# Patient Record
Sex: FEMALE | Race: WHITE | NOT HISPANIC OR LATINO | Employment: UNEMPLOYED | URBAN - METROPOLITAN AREA
[De-identification: names, ages, dates, MRNs, and addresses within clinical notes are randomized per-mention and may not be internally consistent; named-entity substitution may affect disease eponyms.]

---

## 2017-01-03 ENCOUNTER — PATIENT MESSAGE (OUTPATIENT)
Dept: FAMILY MEDICINE | Facility: CLINIC | Age: 27
End: 2017-01-03

## 2017-01-04 ENCOUNTER — OFFICE VISIT (OUTPATIENT)
Dept: URGENT CARE | Facility: CLINIC | Age: 27
End: 2017-01-04
Payer: MEDICAID

## 2017-01-04 VITALS
BODY MASS INDEX: 23.64 KG/M2 | SYSTOLIC BLOOD PRESSURE: 124 MMHG | WEIGHT: 138.44 LBS | DIASTOLIC BLOOD PRESSURE: 81 MMHG | TEMPERATURE: 98 F | HEART RATE: 86 BPM | HEIGHT: 64 IN

## 2017-01-04 DIAGNOSIS — R10.13 EPIGASTRIC ABDOMINAL PAIN: ICD-10-CM

## 2017-01-04 DIAGNOSIS — J06.9 UPPER RESPIRATORY TRACT INFECTION, UNSPECIFIED TYPE: Primary | ICD-10-CM

## 2017-01-04 PROCEDURE — 96372 THER/PROPH/DIAG INJ SC/IM: CPT | Mod: PBBFAC,PO

## 2017-01-04 PROCEDURE — 99213 OFFICE O/P EST LOW 20 MIN: CPT | Mod: S$PBB,,, | Performed by: NURSE PRACTITIONER

## 2017-01-04 PROCEDURE — 99214 OFFICE O/P EST MOD 30 MIN: CPT | Mod: PBBFAC,PO | Performed by: NURSE PRACTITIONER

## 2017-01-04 PROCEDURE — 99999 PR PBB SHADOW E&M-EST. PATIENT-LVL IV: CPT | Mod: PBBFAC,,, | Performed by: NURSE PRACTITIONER

## 2017-01-04 RX ORDER — PANTOPRAZOLE SODIUM 40 MG/1
40 TABLET, DELAYED RELEASE ORAL DAILY
Qty: 30 TABLET | Refills: 2 | Status: SHIPPED | OUTPATIENT
Start: 2017-01-04 | End: 2017-11-07

## 2017-01-04 RX ORDER — DEXAMETHASONE SODIUM PHOSPHATE 4 MG/ML
8 INJECTION, SOLUTION INTRA-ARTICULAR; INTRALESIONAL; INTRAMUSCULAR; INTRAVENOUS; SOFT TISSUE ONCE
Status: COMPLETED | OUTPATIENT
Start: 2017-01-04 | End: 2017-01-04

## 2017-01-04 RX ORDER — AZITHROMYCIN 250 MG/1
TABLET, FILM COATED ORAL
Qty: 6 TABLET | Refills: 0 | Status: SHIPPED | OUTPATIENT
Start: 2017-01-04 | End: 2017-01-09

## 2017-01-04 RX ORDER — DICYCLOMINE HYDROCHLORIDE 20 MG/1
20 TABLET ORAL EVERY 6 HOURS
COMMUNITY
End: 2018-01-14

## 2017-01-04 RX ADMIN — DEXAMETHASONE SODIUM PHOSPHATE 8 MG: 4 INJECTION, SOLUTION INTRAMUSCULAR; INTRAVENOUS at 12:01

## 2017-01-04 NOTE — PROGRESS NOTES
CC:   Chief Complaint   Patient presents with    Abdominal Pain    Cough     HPI: This is a new problem.   Sara Sanford is a 26 y.o. female with a complaint of URI.  The current episode started in the past 14 days.   The problem has been gradually worsening.   Associated symptoms included cough, epigastric pain/cramping.    Pertinent negatives include fever, chills, night sweats, nasal congestion, rhinorrhea, sore throat, facial pain, swollen glands, chest pain, hemoptysis, dyspnea, wheezing, nausea, vomiting, diarrhea   Treatments tried: zantac, bentyl has been used and this has provided no relief.     The current allergy list that we have since it was last reconciled is as follows:  Review of patient's allergies indicates:   Allergen Reactions    Darvocet a500 [propoxyphene n-acetaminophen]     Phenytoin sodium extended Other (See Comments)     Outpatient Medications Prior to Visit   Medication Sig Dispense Refill    atenolol (TENORMIN) 25 MG tablet Take 1 tablet (25 mg total) by mouth once daily. 30 tablet 11    butalbital-acetaminophen-caffeine -40 mg (FIORICET, ESGIC) -40 mg per tablet TAKE ONE TABLET BY MOUTH TWICE DAILY 30 tablet 0    citalopram (CELEXA) 40 MG tablet Take 1 tablet (40 mg total) by mouth once daily. 30 tablet 11    gabapentin (NEURONTIN) 600 MG tablet Take 1 tablet (600 mg total) by mouth 4 (four) times daily. 120 tablet 5    gabapentin (NEURONTIN) 800 MG tablet Take 1 tablet (800 mg total) by mouth 3 (three) times daily. 90 tablet 11    hydrOXYzine pamoate (VISTARIL) 25 MG Cap Take 1 capsule (25 mg total) by mouth every 8 (eight) hours as needed. 90 capsule 6    ibuprofen (ADVIL,MOTRIN) 800 MG tablet Take 1 tablet (800 mg total) by mouth 3 (three) times daily. 90 tablet 11    levetiracetam (KEPPRA) 500 MG Tab TAKE TWO TABLETS BY MOUTH TWICE DAILY AS DIRECTED  5    medroxyPROGESTERone (DEPO-PROVERA) 150 mg/mL injection Inject 1 mL (150 mg total) into the muscle  "every 3 (three) months. 1 mL 3    ondansetron (ZOFRAN) 4 MG tablet TAKE ONE TABLET BY MOUTH EVERY 6 HOURS AS NEEDED FOR NAUSEA 20 tablet 1    ranitidine (ZANTAC) 150 MG tablet Take 1 tablet (150 mg total) by mouth 2 (two) times daily. 60 tablet 11    trazodone (DESYREL) 150 MG tablet Take 1 tablet (150 mg total) by mouth every evening. 30 tablet 11    busPIRone (BUSPAR) 5 MG Tab Take 1 tablet (5 mg total) by mouth 2 (two) times daily. 60 tablet 6    hydrOXYzine pamoate (VISTARIL) 25 MG Cap TAKE ONE CAPSULE BY MOUTH EVERY 8 HOURS AS NEEDED FOR ANXIETY 90 capsule 0     Facility-Administered Medications Prior to Visit   Medication Dose Route Frequency Provider Last Rate Last Dose    medroxyPROGESTERone (DEPO-PROVERA) injection 150 mg  150 mg Intramuscular Q90 Days Kristian Landaverde MD   150 mg at 10/28/16 1029        Physical Exam   Visit Vitals    /81    Pulse 86    Temp 97.8 °F (36.6 °C)    Ht 5' 4" (1.626 m)    Wt 62.8 kg (138 lb 7.2 oz)    LMP  (LMP Unknown)    BMI 23.76 kg/m2     Physical Exam   Constitutional: She is oriented to person, place, and time. Vital signs are normal. She appears well-developed and well-nourished.   HENT:   Head: Normocephalic and atraumatic.   Right Ear: Tympanic membrane and ear canal normal.   Left Ear: Tympanic membrane and ear canal normal.   Nose: Mucosal edema and rhinorrhea present.   Mouth/Throat: Uvula is midline and mucous membranes are normal. Posterior oropharyngeal erythema present. No oropharyngeal exudate.   Eyes: EOM are normal. Pupils are equal, round, and reactive to light.   Neck: Neck supple.   Cardiovascular: Normal rate, regular rhythm and normal heart sounds.    Pulmonary/Chest: Effort normal and breath sounds normal.   Abdominal: Soft. Bowel sounds are normal. There is tenderness in the epigastric area.   Musculoskeletal: Normal range of motion.   Neurological: She is alert and oriented to person, place, and time.   Skin: Skin is warm and dry. "   Psychiatric: She has a normal mood and affect. Her behavior is normal. Judgment and thought content normal.   Nursing note and vitals reviewed.         Encounter Diagnoses   Name Primary?    Upper respiratory tract infection, unspecified type Yes    Epigastric abdominal pain        PLAN:    Sara was seen today for abdominal pain and cough.    Diagnoses and all orders for this visit:    Upper respiratory tract infection, unspecified type  -     dexamethasone injection 8 mg; Inject 2 mLs (8 mg total) into the muscle once.  -     azithromycin (Z-PARTH) 250 MG tablet; Take 2 tablets by mouth on day 1; Take 1 tablet by mouth on days 2-5    Epigastric abdominal pain  -     pantoprazole (PROTONIX) 40 MG tablet; Take 1 tablet (40 mg total) by mouth once daily.      Medications Ordered This Encounter      azithromycin (Z-PARTH) 250 MG tablet          Sig: Take 2 tablets by mouth on day 1; Take 1 tablet by mouth on days 2-5          Dispense:  6 tablet          Refill:  0      dexamethasone injection 8 mg      pantoprazole (PROTONIX) 40 MG tablet          Sig: Take 1 tablet (40 mg total) by mouth once daily.          Dispense:  30 tablet          Refill:  2  No orders of the defined types were placed in this encounter.    RTC if symptoms are worsening or changing significantly or if not improved by the end of therapy.

## 2017-01-04 NOTE — MR AVS SNAPSHOT
[facility]  [address]  [place] [address]  Phone: [phone]  Fax: [phone]                  Sara Sanford   2017 12:00 PM   Office Visit    Description:  Female : 1990   Provider:  Anabel Wan NP   Department:  [facility]           Reason for Visit     Abdominal Pain     Cough           Diagnoses this Visit        Comments    Upper respiratory tract infection, unspecified type    -  Primary     Epigastric abdominal pain                To Do List           Future Appointments        Provider Department Dept Phone    2017 8:30 AM NURSE, [facility] [phone]      Goals (5 Years of Data)     None      Follow-Up and Disposition     Return if symptoms worsen or fail to improve.       These Medications        Disp Refills Start End    azithromycin (Z-PARTH) 250 MG tablet 6 tablet 0 2017    Take 2 tablets by mouth on day 1; Take 1 tablet by mouth on days 2-5    [facility] [place] [facility] [address] Ph #: [phone]       pantoprazole (PROTONIX) 40 MG tablet 30 tablet 2 2017    Take 1 tablet (40 mg total) by mouth once daily. - Oral    [facility]  [place] - [address] [facility] Ph #: [phone]         [facility] On Call     [facility] On Call Nurse Care Line -  Assistance  Registered nurses in the [facility] provide clinical advisement, health education, appointment booking, and other advisory services.  Call for this free service at [phone].             Medications           Message regarding Medications     Verify the changes and/or additions to your medication regime listed below are the same as discussed with your clinician today.  If any of these changes or additions are incorrect, please notify your healthcare provider.        START taking these NEW medications        Refills     azithromycin (Z-PARTH) 250 MG tablet 0    Sig: Take 2 tablets by mouth on day 1; Take 1 tablet by mouth on days 2-5    Class: Normal    pantoprazole (PROTONIX) 40 MG tablet 2    Sig: Take 1 tablet (40 mg total) by mouth once daily.    Class: Normal    Route: Oral      These medications were administered today        Dose Freq    dexamethasone injection 8 mg 8 mg Once    Sig: Inject 2 mLs (8 mg total) into the muscle once.    Class: Normal    Route: Intramuscular           Verify that the below list of medications is an accurate representation of the medications you are currently taking.  If none reported, the list may be blank. If incorrect, please contact your healthcare provider. Carry this list with you in case of emergency.           Current Medications     atenolol (TENORMIN) 25 MG tablet Take 1 tablet (25 mg total) by mouth once daily.    butalbital-acetaminophen-caffeine -40 mg (FIORICET, ESGIC) -40 mg per tablet TAKE ONE TABLET BY MOUTH TWICE DAILY    citalopram (CELEXA) 40 MG tablet Take 1 tablet (40 mg total) by mouth once daily.    dicyclomine (BENTYL) 20 mg tablet Take 20 mg by mouth every 6 (six) hours.    gabapentin (NEURONTIN) 600 MG tablet Take 1 tablet (600 mg total) by mouth 4 (four) times daily.    gabapentin (NEURONTIN) 800 MG tablet Take 1 tablet (800 mg total) by mouth 3 (three) times daily.    hydrOXYzine pamoate (VISTARIL) 25 MG Cap Take 1 capsule (25 mg total) by mouth every 8 (eight) hours as needed.    ibuprofen (ADVIL,MOTRIN) 800 MG tablet Take 1 tablet (800 mg total) by mouth 3 (three) times daily.    levetiracetam (KEPPRA) 500 MG Tab TAKE TWO TABLETS BY MOUTH TWICE DAILY AS DIRECTED    medroxyPROGESTERone (DEPO-PROVERA) 150 mg/mL injection Inject 1 mL (150 mg total) into the muscle every 3 (three) months.    ondansetron (ZOFRAN) 4 MG tablet TAKE ONE TABLET BY MOUTH EVERY 6 HOURS AS NEEDED FOR NAUSEA    ranitidine (ZANTAC) 150 MG tablet Take 1 tablet (150 mg total) by mouth  "2 (two) times daily.    trazodone (DESYREL) 150 MG tablet Take 1 tablet (150 mg total) by mouth every evening.    azithromycin (Z-PARTH) 250 MG tablet Take 2 tablets by mouth on day 1; Take 1 tablet by mouth on days 2-5    busPIRone (BUSPAR) 5 MG Tab Take 1 tablet (5 mg total) by mouth 2 (two) times daily.    pantoprazole (PROTONIX) 40 MG tablet Take 1 tablet (40 mg total) by mouth once daily.           Clinical Reference Information           Vital Signs - Last Recorded  Most recent update: 1/4/2017 12:09 PM by Shari Isaac LPN    BP Pulse Temp Ht Wt LMP    124/81 86 97.8 °F (36.6 °C) 5' 4" (1.626 m) 62.8 kg (138 lb 7.2 oz) (LMP Unknown)    BMI                23.76 kg/m2          Blood Pressure          Most Recent Value    BP  124/81      Allergies as of 1/4/2017     Darvocet A500 [Propoxyphene N-acetaminophen]    Phenytoin Sodium Extended      Immunizations Administered on Date of Encounter - 1/4/2017     None      Smoking Cessation     If you would like to quit smoking:   You may be eligible for free services if you are a [place] resident and started smoking cigarettes before September 1, 1988.  Call the Smoking Cessation Trust (SCT) toll free at [phone] or [phone].   Call 5-498-QUIT-NOW if you do not meet the above criteria.            "

## 2017-01-16 RX ORDER — BUTALBITAL, ACETAMINOPHEN AND CAFFEINE 50; 325; 40 MG/1; MG/1; MG/1
TABLET ORAL
Qty: 30 TABLET | Refills: 0 | Status: SHIPPED | OUTPATIENT
Start: 2017-01-16 | End: 2017-01-30 | Stop reason: SDUPTHER

## 2017-01-24 ENCOUNTER — CLINICAL SUPPORT (OUTPATIENT)
Dept: FAMILY MEDICINE | Facility: CLINIC | Age: 27
End: 2017-01-24
Payer: MEDICAID

## 2017-01-24 DIAGNOSIS — Z30.9 ENCOUNTER FOR CONTRACEPTIVE MANAGEMENT, UNSPECIFIED CONTRACEPTIVE ENCOUNTER TYPE: Primary | ICD-10-CM

## 2017-01-24 PROCEDURE — 96372 THER/PROPH/DIAG INJ SC/IM: CPT | Mod: PBBFAC,PO

## 2017-01-24 RX ADMIN — MEDROXYPROGESTERONE ACETATE 150 MG: 150 INJECTION, SUSPENSION INTRAMUSCULAR at 08:01

## 2017-01-30 RX ORDER — BUTALBITAL, ACETAMINOPHEN AND CAFFEINE 50; 325; 40 MG/1; MG/1; MG/1
TABLET ORAL
Qty: 30 TABLET | Refills: 0 | Status: SHIPPED | OUTPATIENT
Start: 2017-01-30 | End: 2017-02-13 | Stop reason: SDUPTHER

## 2017-01-31 ENCOUNTER — PATIENT MESSAGE (OUTPATIENT)
Dept: FAMILY MEDICINE | Facility: CLINIC | Age: 27
End: 2017-01-31

## 2017-02-01 ENCOUNTER — OFFICE VISIT (OUTPATIENT)
Dept: FAMILY MEDICINE | Facility: CLINIC | Age: 27
End: 2017-02-01
Payer: MEDICAID

## 2017-02-01 VITALS
WEIGHT: 139.88 LBS | TEMPERATURE: 99 F | BODY MASS INDEX: 23.88 KG/M2 | HEIGHT: 64 IN | DIASTOLIC BLOOD PRESSURE: 69 MMHG | SYSTOLIC BLOOD PRESSURE: 108 MMHG | HEART RATE: 73 BPM

## 2017-02-01 DIAGNOSIS — J06.9 UPPER RESPIRATORY TRACT INFECTION, UNSPECIFIED TYPE: Primary | ICD-10-CM

## 2017-02-01 PROCEDURE — 99213 OFFICE O/P EST LOW 20 MIN: CPT | Mod: S$PBB,,, | Performed by: NURSE PRACTITIONER

## 2017-02-01 PROCEDURE — 99214 OFFICE O/P EST MOD 30 MIN: CPT | Mod: PBBFAC,PO | Performed by: NURSE PRACTITIONER

## 2017-02-01 PROCEDURE — 99999 PR PBB SHADOW E&M-EST. PATIENT-LVL IV: CPT | Mod: PBBFAC,,, | Performed by: NURSE PRACTITIONER

## 2017-02-01 RX ORDER — AZITHROMYCIN 250 MG/1
TABLET, FILM COATED ORAL
Qty: 6 TABLET | Refills: 0 | Status: SHIPPED | OUTPATIENT
Start: 2017-02-01 | End: 2017-02-06

## 2017-02-01 RX ORDER — MINERAL OIL
180 ENEMA (ML) RECTAL DAILY
Qty: 10 TABLET | Refills: 0 | Status: SHIPPED | OUTPATIENT
Start: 2017-02-01 | End: 2017-07-06

## 2017-02-01 NOTE — MR AVS SNAPSHOT
[facility] [place]  [address] [address]  Phone: [phone]  Fax: [phone]                  Sara Sanford   2017 1:40 PM   Office Visit    Description:  Female : 1990   Provider:  Deborah Valente NP   Department:  [facility] [place]           Reason for Visit     Cough     Nasal Congestion           Diagnoses this Visit        Comments    Upper respiratory tract infection, unspecified type    -  Primary            To Do List           Future Appointments        Provider Department Dept Phone    2017 10:00 AM NURSE, [facility] [phone]      Goals (5 Years of Data)     None       These Medications        Disp Refills Start End    fexofenadine (ALLEGRA) 180 MG tablet 10 tablet 0 2017    Take 1 tablet (180 mg total) by mouth once daily. - Oral    [facility]  [place] - [address] Ph #: [phone]       azithromycin (Z-PARTH) 250 MG tablet 6 tablet 0 2017    Take 2 tablets by mouth on day 1; Take 1 tablet by mouth on days 2-5    [facility] [place] [facility] [address] [facility] [address] [facility] [address] Ph #: [phone]         [facility] On Call     [facility] On Call Nurse Care Line -  Assistance  Registered nurses in the [facility] provide clinical advisement, health education, appointment booking, and other advisory services.  Call for this free service at [phone].             Medications           Message regarding Medications     Verify the changes and/or additions to your medication regime listed below are the same as discussed with your clinician today.  If any of these changes or additions are incorrect, please notify your healthcare provider.        START taking these NEW medications        Refills    fexofenadine (ALLEGRA) 180 MG tablet 0    Sig: Take 1 tablet (180 mg total) by mouth once  daily.    Class: Normal    Route: Oral    azithromycin (Z-PARTH) 250 MG tablet 0    Sig: Take 2 tablets by mouth on day 1; Take 1 tablet by mouth on days 2-5    Class: Print           Verify that the below list of medications is an accurate representation of the medications you are currently taking.  If none reported, the list may be blank. If incorrect, please contact your healthcare provider. Carry this list with you in case of emergency.           Current Medications     atenolol (TENORMIN) 25 MG tablet Take 1 tablet (25 mg total) by mouth once daily.    butalbital-acetaminophen-caffeine -40 mg (FIORICET, ESGIC) -40 mg per tablet TAKE ONE TABLET BY MOUTH TWICE DAILY    citalopram (CELEXA) 40 MG tablet Take 1 tablet (40 mg total) by mouth once daily.    dicyclomine (BENTYL) 20 mg tablet Take 20 mg by mouth every 6 (six) hours.    gabapentin (NEURONTIN) 600 MG tablet Take 1 tablet (600 mg total) by mouth 4 (four) times daily.    gabapentin (NEURONTIN) 800 MG tablet Take 1 tablet (800 mg total) by mouth 3 (three) times daily.    hydrOXYzine pamoate (VISTARIL) 25 MG Cap Take 1 capsule (25 mg total) by mouth every 8 (eight) hours as needed.    ibuprofen (ADVIL,MOTRIN) 800 MG tablet Take 1 tablet (800 mg total) by mouth 3 (three) times daily.    levetiracetam (KEPPRA) 500 MG Tab TAKE TWO TABLETS BY MOUTH TWICE DAILY AS DIRECTED    medroxyPROGESTERone (DEPO-PROVERA) 150 mg/mL injection Inject 1 mL (150 mg total) into the muscle every 3 (three) months.    ondansetron (ZOFRAN) 4 MG tablet TAKE ONE TABLET BY MOUTH EVERY 6 HOURS AS NEEDED FOR NAUSEA    pantoprazole (PROTONIX) 40 MG tablet Take 1 tablet (40 mg total) by mouth once daily.    ranitidine (ZANTAC) 150 MG tablet Take 1 tablet (150 mg total) by mouth 2 (two) times daily.    trazodone (DESYREL) 150 MG tablet Take 1 tablet (150 mg total) by mouth every evening.    azithromycin (Z-PARTH) 250 MG tablet Take 2 tablets by mouth on day 1; Take 1 tablet by mouth  "on days 2-5    busPIRone (BUSPAR) 5 MG Tab Take 1 tablet (5 mg total) by mouth 2 (two) times daily.    fexofenadine (ALLEGRA) 180 MG tablet Take 1 tablet (180 mg total) by mouth once daily.           Clinical Reference Information           Vital Signs - Last Recorded  Most recent update: 2/1/2017  1:50 PM by Shari Isaac LPN    BP Pulse Temp Ht Wt LMP    108/69 73 98.9 °F (37.2 °C) 5' 4" (1.626 m) 63.5 kg (139 lb 14.1 oz) (LMP Unknown)    BMI                24.01 kg/m2          Blood Pressure          Most Recent Value    BP  108/69      Allergies as of 2/1/2017     Darvocet A500 [Propoxyphene N-acetaminophen]    Phenytoin Sodium Extended      Immunizations Administered on Date of Encounter - 2/1/2017     None      Smoking Cessation     If you would like to quit smoking:   You may be eligible for free services if you are a [place] resident and started smoking cigarettes before September 1, 1988.  Call the Smoking Cessation Trust (SCT) toll free at [phone] or [phone].   Call 1-504-QUIT-NOW if you do not meet the above criteria.            "

## 2017-02-01 NOTE — PROGRESS NOTES
Subjective:       Patient ID: Sara Sanford is a 26 y.o. female.    Chief Complaint: Cough and Nasal Congestion    URI    This is a new problem. The current episode started in the past 7 days. The problem has been gradually improving. There has been no fever. Associated symptoms include congestion, coughing and rhinorrhea. Pertinent negatives include no abdominal pain, chest pain, diarrhea, dysuria, ear pain, headaches, joint pain, joint swelling, nausea, neck pain, plugged ear sensation, rash, sinus pain, sneezing, sore throat, swollen glands, vomiting or wheezing. She has tried decongestant for the symptoms. The treatment provided mild relief.     Past Medical History   Diagnosis Date    Abnormal Pap smear     ALLERGIC RHINITIS     Anemia     Chronic back pain     Drug-seeking behavior 4/16/2012    Epilepsy     Hypertension     Opioid dependence 4/16/2012    Seizures      last-10/2011, EEG-normal    Urinary tract infection      Social History     Social History    Marital status: Single     Spouse name: N/A    Number of children: N/A    Years of education: N/A     Occupational History         Social History Main Topics    Smoking status: Current Every Day Smoker     Packs/day: 0.25     Years: 5.00    Smokeless tobacco: Never Used    Alcohol use Yes      Comment: occasional    Drug use: No    Sexual activity: Not Currently     Birth control/ protection: Injection     Past Surgical History   Procedure Laterality Date    Vaginal delivery       times 4    [place] tooth extraction      Cholecystectomy      Vestibulectomy         Review of Systems   Constitutional: Negative.    HENT: Positive for congestion, postnasal drip and rhinorrhea. Negative for ear pain, sneezing and sore throat.    Eyes: Negative.    Respiratory: Positive for cough. Negative for wheezing.    Cardiovascular: Negative.  Negative for chest pain.   Gastrointestinal: Negative.  Negative for abdominal pain, diarrhea, nausea and  vomiting.   Endocrine: Negative.    Genitourinary: Negative.  Negative for dysuria.   Musculoskeletal: Negative.  Negative for joint pain and neck pain.   Skin: Negative.  Negative for rash.   Allergic/Immunologic: Negative.    Neurological: Negative.  Negative for headaches.   Psychiatric/Behavioral: Negative.        Objective:      Physical Exam   Constitutional: She is oriented to person, place, and time. She appears well-developed and well-nourished.   HENT:   Head: Normocephalic.   Right Ear: Hearing, tympanic membrane, external ear and ear canal normal.   Left Ear: Hearing, tympanic membrane, external ear and ear canal normal.   Nose: Rhinorrhea present. Right sinus exhibits no maxillary sinus tenderness and no frontal sinus tenderness. Left sinus exhibits no maxillary sinus tenderness and no frontal sinus tenderness.   Mouth/Throat: Uvula is midline, oropharynx is clear and moist and mucous membranes are normal.   Eyes: Conjunctivae are normal. Pupils are equal, round, and reactive to light.   Neck: Normal range of motion. Neck supple.   Cardiovascular: Normal rate, regular rhythm and normal heart sounds.    Pulmonary/Chest: Effort normal and breath sounds normal.   Abdominal: Soft. Bowel sounds are normal.   Musculoskeletal: Normal range of motion.   Neurological: She is alert and oriented to person, place, and time.   Skin: Skin is warm and dry.   Psychiatric: She has a normal mood and affect. Her behavior is normal. Judgment and thought content normal.   Nursing note and vitals reviewed.      Assessment:       1. Upper respiratory tract infection, unspecified type        Plan:           Sara was seen today for cough and nasal congestion.    Diagnoses and all orders for this visit:    Upper respiratory tract infection, unspecified type  -     fexofenadine (ALLEGRA) 180 MG tablet; Take 1 tablet (180 mg total) by mouth once daily.  -     azithromycin (Z-PARTH) 250 MG tablet; Take 2 tablets by mouth on day 1;  Take 1 tablet by mouth on days 2-5  Begin abx if symptoms persist >7-10 d

## 2017-02-13 RX ORDER — BUTALBITAL, ACETAMINOPHEN AND CAFFEINE 50; 325; 40 MG/1; MG/1; MG/1
1 TABLET ORAL 2 TIMES DAILY
Qty: 30 TABLET | Refills: 0 | Status: SHIPPED | OUTPATIENT
Start: 2017-02-13 | End: 2017-02-27 | Stop reason: SDUPTHER

## 2017-02-27 RX ORDER — BUTALBITAL, ACETAMINOPHEN AND CAFFEINE 50; 325; 40 MG/1; MG/1; MG/1
1 TABLET ORAL 2 TIMES DAILY
Qty: 30 TABLET | Refills: 0 | Status: SHIPPED | OUTPATIENT
Start: 2017-02-27 | End: 2017-03-10 | Stop reason: SDUPTHER

## 2017-03-10 RX ORDER — BUTALBITAL, ACETAMINOPHEN AND CAFFEINE 50; 325; 40 MG/1; MG/1; MG/1
1 TABLET ORAL 2 TIMES DAILY
Qty: 30 TABLET | Refills: 0 | Status: SHIPPED | OUTPATIENT
Start: 2017-03-10 | End: 2017-03-24 | Stop reason: SDUPTHER

## 2017-03-10 RX ORDER — IBUPROFEN 800 MG/1
800 TABLET ORAL 3 TIMES DAILY
Qty: 90 TABLET | Refills: 11 | Status: SHIPPED | OUTPATIENT
Start: 2017-03-10 | End: 2018-01-14

## 2017-03-24 RX ORDER — BUTALBITAL, ACETAMINOPHEN AND CAFFEINE 50; 325; 40 MG/1; MG/1; MG/1
1 TABLET ORAL 2 TIMES DAILY
Qty: 30 TABLET | Refills: 0 | Status: SHIPPED | OUTPATIENT
Start: 2017-03-24 | End: 2017-04-04 | Stop reason: SDUPTHER

## 2017-04-04 RX ORDER — BUTALBITAL, ACETAMINOPHEN AND CAFFEINE 50; 325; 40 MG/1; MG/1; MG/1
1 TABLET ORAL 2 TIMES DAILY
Qty: 30 TABLET | Refills: 0 | Status: SHIPPED | OUTPATIENT
Start: 2017-04-04 | End: 2017-04-21 | Stop reason: SDUPTHER

## 2017-04-06 DIAGNOSIS — Z30.9 ENCOUNTER FOR CONTRACEPTIVE MANAGEMENT, UNSPECIFIED TYPE: Primary | ICD-10-CM

## 2017-04-06 RX ORDER — MEDROXYPROGESTERONE ACETATE 150 MG/ML
150 INJECTION, SUSPENSION INTRAMUSCULAR
Status: CANCELLED | OUTPATIENT
Start: 2017-04-13

## 2017-04-13 ENCOUNTER — PATIENT MESSAGE (OUTPATIENT)
Dept: FAMILY MEDICINE | Facility: CLINIC | Age: 27
End: 2017-04-13

## 2017-04-13 ENCOUNTER — CLINICAL SUPPORT (OUTPATIENT)
Dept: FAMILY MEDICINE | Facility: CLINIC | Age: 27
End: 2017-04-13
Payer: MEDICAID

## 2017-04-13 DIAGNOSIS — Z30.41 ENCOUNTER FOR BIRTH CONTROL PILLS MAINTENANCE: Primary | ICD-10-CM

## 2017-04-13 PROCEDURE — 96372 THER/PROPH/DIAG INJ SC/IM: CPT | Mod: PBBFAC,PO

## 2017-04-13 RX ADMIN — MEDROXYPROGESTERONE ACETATE 150 MG: 150 INJECTION, SUSPENSION INTRAMUSCULAR at 01:04

## 2017-04-21 RX ORDER — BUTALBITAL, ACETAMINOPHEN AND CAFFEINE 50; 325; 40 MG/1; MG/1; MG/1
TABLET ORAL
Qty: 30 TABLET | OUTPATIENT
Start: 2017-04-21

## 2017-04-21 RX ORDER — BUTALBITAL, ACETAMINOPHEN AND CAFFEINE 50; 325; 40 MG/1; MG/1; MG/1
1 TABLET ORAL 2 TIMES DAILY
Qty: 30 TABLET | Refills: 0 | Status: SHIPPED | OUTPATIENT
Start: 2017-04-21 | End: 2017-05-08 | Stop reason: SDUPTHER

## 2017-05-08 ENCOUNTER — PATIENT MESSAGE (OUTPATIENT)
Dept: FAMILY MEDICINE | Facility: CLINIC | Age: 27
End: 2017-05-08

## 2017-05-08 RX ORDER — BUTALBITAL, ACETAMINOPHEN AND CAFFEINE 50; 325; 40 MG/1; MG/1; MG/1
TABLET ORAL
Qty: 30 TABLET | OUTPATIENT
Start: 2017-05-08

## 2017-05-08 RX ORDER — BUTALBITAL, ACETAMINOPHEN AND CAFFEINE 50; 325; 40 MG/1; MG/1; MG/1
1 TABLET ORAL 2 TIMES DAILY
Qty: 30 TABLET | Refills: 0 | Status: SHIPPED | OUTPATIENT
Start: 2017-05-08 | End: 2017-05-22 | Stop reason: SDUPTHER

## 2017-05-22 ENCOUNTER — PATIENT MESSAGE (OUTPATIENT)
Dept: FAMILY MEDICINE | Facility: CLINIC | Age: 27
End: 2017-05-22

## 2017-05-22 RX ORDER — BUTALBITAL, ACETAMINOPHEN AND CAFFEINE 50; 325; 40 MG/1; MG/1; MG/1
TABLET ORAL
Qty: 30 TABLET | Refills: 0 | Status: SHIPPED | OUTPATIENT
Start: 2017-05-22 | End: 2017-06-02 | Stop reason: SDUPTHER

## 2017-05-22 RX ORDER — BUTALBITAL, ACETAMINOPHEN AND CAFFEINE 50; 325; 40 MG/1; MG/1; MG/1
1 TABLET ORAL 2 TIMES DAILY
Qty: 30 TABLET | Refills: 0 | OUTPATIENT
Start: 2017-05-22

## 2017-05-24 ENCOUNTER — PATIENT MESSAGE (OUTPATIENT)
Dept: FAMILY MEDICINE | Facility: CLINIC | Age: 27
End: 2017-05-24

## 2017-06-02 RX ORDER — BUTALBITAL, ACETAMINOPHEN AND CAFFEINE 50; 325; 40 MG/1; MG/1; MG/1
1 TABLET ORAL 2 TIMES DAILY
Qty: 30 TABLET | Refills: 0 | Status: SHIPPED | OUTPATIENT
Start: 2017-06-02 | End: 2017-06-16 | Stop reason: SDUPTHER

## 2017-06-16 RX ORDER — BUTALBITAL, ACETAMINOPHEN AND CAFFEINE 50; 325; 40 MG/1; MG/1; MG/1
1 TABLET ORAL 2 TIMES DAILY
Qty: 30 TABLET | Refills: 0 | Status: SHIPPED | OUTPATIENT
Start: 2017-06-16 | End: 2017-06-30 | Stop reason: SDUPTHER

## 2017-06-19 ENCOUNTER — PATIENT MESSAGE (OUTPATIENT)
Dept: OBSTETRICS AND GYNECOLOGY | Facility: CLINIC | Age: 27
End: 2017-06-19

## 2017-06-19 DIAGNOSIS — Z11.3 SCREENING EXAMINATION FOR STD (SEXUALLY TRANSMITTED DISEASE): Primary | ICD-10-CM

## 2017-06-20 ENCOUNTER — PATIENT MESSAGE (OUTPATIENT)
Dept: OBSTETRICS AND GYNECOLOGY | Facility: CLINIC | Age: 27
End: 2017-06-20

## 2017-06-22 ENCOUNTER — PATIENT MESSAGE (OUTPATIENT)
Dept: FAMILY MEDICINE | Facility: CLINIC | Age: 27
End: 2017-06-22

## 2017-06-22 DIAGNOSIS — Z00.00 ROUTINE HEALTH MAINTENANCE: Primary | ICD-10-CM

## 2017-06-23 ENCOUNTER — LAB VISIT (OUTPATIENT)
Dept: LAB | Facility: HOSPITAL | Age: 27
End: 2017-06-23
Attending: SPECIALIST
Payer: MEDICAID

## 2017-06-23 ENCOUNTER — PATIENT MESSAGE (OUTPATIENT)
Dept: OBSTETRICS AND GYNECOLOGY | Facility: CLINIC | Age: 27
End: 2017-06-23

## 2017-06-23 DIAGNOSIS — Z11.3 SCREENING EXAMINATION FOR STD (SEXUALLY TRANSMITTED DISEASE): ICD-10-CM

## 2017-06-23 DIAGNOSIS — Z11.3 SCREENING FOR STD (SEXUALLY TRANSMITTED DISEASE): ICD-10-CM

## 2017-06-23 PROCEDURE — 36415 COLL VENOUS BLD VENIPUNCTURE: CPT | Mod: PO

## 2017-06-23 PROCEDURE — 86703 HIV-1/HIV-2 1 RESULT ANTBDY: CPT

## 2017-06-23 PROCEDURE — 86695 HERPES SIMPLEX TYPE 1 TEST: CPT

## 2017-06-23 PROCEDURE — 86694 HERPES SIMPLEX NES ANTBDY: CPT

## 2017-06-23 PROCEDURE — 86592 SYPHILIS TEST NON-TREP QUAL: CPT

## 2017-06-23 PROCEDURE — 80074 ACUTE HEPATITIS PANEL: CPT

## 2017-06-24 LAB — RPR SER QL: NORMAL

## 2017-06-26 DIAGNOSIS — Z30.9 ENCOUNTER FOR CONTRACEPTIVE MANAGEMENT, UNSPECIFIED TYPE: Primary | ICD-10-CM

## 2017-06-26 LAB
HAV IGM SERPL QL IA: NEGATIVE
HBV CORE IGM SERPL QL IA: NEGATIVE
HBV SURFACE AG SERPL QL IA: NEGATIVE
HCV AB SERPL QL IA: NEGATIVE
HIV 1+2 AB+HIV1 P24 AG SERPL QL IA: NEGATIVE
HIV 1+2 AB+HIV1 P24 AG SERPL QL IA: NEGATIVE

## 2017-06-26 RX ORDER — MEDROXYPROGESTERONE ACETATE 150 MG/ML
150 INJECTION, SUSPENSION INTRAMUSCULAR
Status: COMPLETED | OUTPATIENT
Start: 2017-07-06 | End: 2017-07-06

## 2017-06-27 LAB
HSV AB, IGM BY EIA: NEGATIVE
HSV1 IGG SERPL QL IA: NEGATIVE
HSV2 IGG SERPL QL IA: NEGATIVE

## 2017-06-30 RX ORDER — BUTALBITAL, ACETAMINOPHEN AND CAFFEINE 50; 325; 40 MG/1; MG/1; MG/1
TABLET ORAL
Qty: 30 TABLET | Refills: 0 | Status: SHIPPED | OUTPATIENT
Start: 2017-06-30 | End: 2017-07-11 | Stop reason: SDUPTHER

## 2017-07-06 ENCOUNTER — CLINICAL SUPPORT (OUTPATIENT)
Dept: FAMILY MEDICINE | Facility: CLINIC | Age: 27
End: 2017-07-06
Payer: MEDICAID

## 2017-07-06 ENCOUNTER — LAB VISIT (OUTPATIENT)
Dept: LAB | Facility: HOSPITAL | Age: 27
End: 2017-07-06
Attending: FAMILY MEDICINE
Payer: MEDICAID

## 2017-07-06 DIAGNOSIS — Z30.41 ENCOUNTER FOR BIRTH CONTROL PILLS MAINTENANCE: Primary | ICD-10-CM

## 2017-07-06 DIAGNOSIS — Z00.00 ROUTINE HEALTH MAINTENANCE: ICD-10-CM

## 2017-07-06 LAB
ALBUMIN SERPL BCP-MCNC: 4.1 G/DL
ALP SERPL-CCNC: 111 U/L
ALT SERPL W/O P-5'-P-CCNC: 29 U/L
ANION GAP SERPL CALC-SCNC: 8 MMOL/L
AST SERPL-CCNC: 21 U/L
BASOPHILS # BLD AUTO: 0.02 K/UL
BASOPHILS NFR BLD: 0.4 %
BILIRUB SERPL-MCNC: 1 MG/DL
BUN SERPL-MCNC: 10 MG/DL
CALCIUM SERPL-MCNC: 9.2 MG/DL
CHLORIDE SERPL-SCNC: 109 MMOL/L
CHOLEST/HDLC SERPL: 3.5 {RATIO}
CO2 SERPL-SCNC: 23 MMOL/L
CREAT SERPL-MCNC: 0.8 MG/DL
DIFFERENTIAL METHOD: ABNORMAL
EOSINOPHIL # BLD AUTO: 0.2 K/UL
EOSINOPHIL NFR BLD: 2.8 %
ERYTHROCYTE [DISTWIDTH] IN BLOOD BY AUTOMATED COUNT: 12.8 %
EST. GFR  (AFRICAN AMERICAN): >60 ML/MIN/1.73 M^2
EST. GFR  (NON AFRICAN AMERICAN): >60 ML/MIN/1.73 M^2
GLUCOSE SERPL-MCNC: 107 MG/DL
HCT VFR BLD AUTO: 41.2 %
HDL/CHOLESTEROL RATIO: 28.6 %
HDLC SERPL-MCNC: 126 MG/DL
HDLC SERPL-MCNC: 36 MG/DL
HGB BLD-MCNC: 14.1 G/DL
LDLC SERPL CALC-MCNC: 47.2 MG/DL
LYMPHOCYTES # BLD AUTO: 1.7 K/UL
LYMPHOCYTES NFR BLD: 29.9 %
MCH RBC QN AUTO: 31.6 PG
MCHC RBC AUTO-ENTMCNC: 34.2 %
MCV RBC AUTO: 92 FL
MONOCYTES # BLD AUTO: 0.5 K/UL
MONOCYTES NFR BLD: 9.4 %
NEUTROPHILS # BLD AUTO: 3.2 K/UL
NEUTROPHILS NFR BLD: 57.3 %
NONHDLC SERPL-MCNC: 90 MG/DL
PLATELET # BLD AUTO: 257 K/UL
PMV BLD AUTO: 9.5 FL
POTASSIUM SERPL-SCNC: 4 MMOL/L
PROT SERPL-MCNC: 7.3 G/DL
RBC # BLD AUTO: 4.46 M/UL
SODIUM SERPL-SCNC: 140 MMOL/L
TRIGL SERPL-MCNC: 214 MG/DL
TSH SERPL DL<=0.005 MIU/L-ACNC: 1.55 UIU/ML
WBC # BLD AUTO: 5.65 K/UL

## 2017-07-06 PROCEDURE — 99999 PR PBB SHADOW E&M-EST. PATIENT-LVL III: CPT | Mod: PBBFAC,,,

## 2017-07-06 PROCEDURE — 99213 OFFICE O/P EST LOW 20 MIN: CPT | Mod: PBBFAC,PO

## 2017-07-06 RX ADMIN — MEDROXYPROGESTERONE ACETATE 150 MG: 150 INJECTION, SUSPENSION INTRAMUSCULAR at 10:07

## 2017-07-07 ENCOUNTER — PATIENT MESSAGE (OUTPATIENT)
Dept: FAMILY MEDICINE | Facility: CLINIC | Age: 27
End: 2017-07-07

## 2017-07-11 ENCOUNTER — OFFICE VISIT (OUTPATIENT)
Dept: FAMILY MEDICINE | Facility: CLINIC | Age: 27
End: 2017-07-11
Payer: MEDICAID

## 2017-07-11 VITALS
HEART RATE: 92 BPM | SYSTOLIC BLOOD PRESSURE: 120 MMHG | HEIGHT: 64 IN | WEIGHT: 143.31 LBS | BODY MASS INDEX: 24.46 KG/M2 | TEMPERATURE: 99 F | DIASTOLIC BLOOD PRESSURE: 76 MMHG

## 2017-07-11 DIAGNOSIS — Z00.00 ROUTINE CHECK-UP: Primary | ICD-10-CM

## 2017-07-11 DIAGNOSIS — R10.13 EPIGASTRIC ABDOMINAL PAIN: ICD-10-CM

## 2017-07-11 PROCEDURE — 99213 OFFICE O/P EST LOW 20 MIN: CPT | Mod: PBBFAC,PO | Performed by: FAMILY MEDICINE

## 2017-07-11 PROCEDURE — 99395 PREV VISIT EST AGE 18-39: CPT | Mod: S$PBB,,, | Performed by: FAMILY MEDICINE

## 2017-07-11 PROCEDURE — 99999 PR PBB SHADOW E&M-EST. PATIENT-LVL III: CPT | Mod: PBBFAC,,, | Performed by: FAMILY MEDICINE

## 2017-07-11 RX ORDER — CITALOPRAM 40 MG/1
40 TABLET, FILM COATED ORAL DAILY
Qty: 90 TABLET | Refills: 3 | Status: SHIPPED | OUTPATIENT
Start: 2017-07-11 | End: 2018-07-25 | Stop reason: SDUPTHER

## 2017-07-11 RX ORDER — BUTALBITAL, ACETAMINOPHEN AND CAFFEINE 50; 325; 40 MG/1; MG/1; MG/1
1 TABLET ORAL 2 TIMES DAILY PRN
Qty: 30 TABLET | Refills: 0 | Status: SHIPPED | OUTPATIENT
Start: 2017-07-11 | End: 2017-08-02 | Stop reason: SDUPTHER

## 2017-07-11 RX ORDER — LEVETIRACETAM 500 MG/1
1000 TABLET ORAL 2 TIMES DAILY
Qty: 360 TABLET | Refills: 3 | Status: SHIPPED | OUTPATIENT
Start: 2017-07-11 | End: 2018-06-28

## 2017-07-11 RX ORDER — PANTOPRAZOLE SODIUM 40 MG/1
40 TABLET, DELAYED RELEASE ORAL DAILY
Qty: 30 TABLET | Refills: 11 | Status: CANCELLED | OUTPATIENT
Start: 2017-07-11 | End: 2018-07-11

## 2017-07-11 NOTE — PROGRESS NOTES
The patient presents today for general health evaluation and counseling      Past Medical History:  Past Medical History:   Diagnosis Date    Abnormal Pap smear     ALLERGIC RHINITIS     Anemia     Chronic back pain     Drug-seeking behavior 4/16/2012    Epilepsy     Hypertension     Opioid dependence 4/16/2012    Seizures     last-10/2011, EEG-normal    Urinary tract infection      Past Surgical History:   Procedure Laterality Date    CHOLECYSTECTOMY      VAGINAL DELIVERY      times 4    vestibulectomy      WISDOM TOOTH EXTRACTION       Review of patient's allergies indicates:   Allergen Reactions    Darvocet a500 [propoxyphene n-acetaminophen]     Phenytoin sodium extended Other (See Comments)     Current Outpatient Prescriptions on File Prior to Visit   Medication Sig Dispense Refill    atenolol (TENORMIN) 25 MG tablet Take 1 tablet (25 mg total) by mouth once daily. 30 tablet 11    butalbital-acetaminophen-caffeine -40 mg (FIORICET, ESGIC) -40 mg per tablet TAKE ONE TABLET BY MOUTH TWICE DAILY AS NEEDED 30 tablet 0    citalopram (CELEXA) 40 MG tablet Take 1 tablet (40 mg total) by mouth once daily. 30 tablet 11    dicyclomine (BENTYL) 20 mg tablet Take 20 mg by mouth every 6 (six) hours.      gabapentin (NEURONTIN) 800 MG tablet Take 1 tablet (800 mg total) by mouth 3 (three) times daily. 90 tablet 11    hydrOXYzine pamoate (VISTARIL) 25 MG Cap Take 1 capsule (25 mg total) by mouth every 8 (eight) hours as needed. 90 capsule 6    ibuprofen (ADVIL,MOTRIN) 800 MG tablet Take 1 tablet (800 mg total) by mouth 3 (three) times daily. 90 tablet 11    levetiracetam (KEPPRA) 500 MG Tab TAKE TWO TABLETS BY MOUTH TWICE DAILY AS DIRECTED  5    medroxyPROGESTERone (DEPO-PROVERA) 150 mg/mL injection Inject 1 mL (150 mg total) into the muscle every 3 (three) months. 1 mL 3    ondansetron (ZOFRAN) 4 MG tablet TAKE ONE TABLET BY MOUTH EVERY 6 HOURS AS NEEDED FOR NAUSEA 20 tablet 1     pantoprazole (PROTONIX) 40 MG tablet Take 1 tablet (40 mg total) by mouth once daily. 30 tablet 2    ranitidine (ZANTAC) 150 MG tablet Take 1 tablet (150 mg total) by mouth 2 (two) times daily. 60 tablet 11    trazodone (DESYREL) 150 MG tablet Take 1 tablet (150 mg total) by mouth every evening. 30 tablet 11    [DISCONTINUED] (Magic mouthwash) 1:1:1 Benadryl 12.5mg/5ml liq, aluminum & magnesium hydroxide-simehticone (Maalox), lidocaine viscous 2% Swish and spit 5 mLs every 4 (four) hours as needed. for mouth sores 360 mL 1     Current Facility-Administered Medications on File Prior to Visit   Medication Dose Route Frequency Provider Last Rate Last Dose    medroxyPROGESTERone (DEPO-PROVERA) injection 150 mg  150 mg Intramuscular Q90 Days Kristian Landaverde MD   150 mg at 04/13/17 1327     Social History     Social History    Marital status: Single     Spouse name: N/A    Number of children: N/A    Years of education: N/A     Occupational History    Not on file.     Social History Main Topics    Smoking status: Current Every Day Smoker     Packs/day: 0.25     Years: 5.00    Smokeless tobacco: Never Used    Alcohol use Yes      Comment: occasional    Drug use: No    Sexual activity: Not Currently     Birth control/ protection: Injection     Other Topics Concern    Not on file     Social History Narrative    No narrative on file     Family History   Problem Relation Age of Onset    Breast cancer Neg Hx     Ovarian cancer Neg Hx          ROS:GENERAL: No fever, chills, fatigability or weight loss.  SKIN: No rashes, itching or changes in color or texture of skin.  HEAD: No headaches or recent head trauma.EYES: Visual acuity fine. No photophobia, ocular pain or diplopia.EARS: Denies ear pain, discharge or vertigo.NOSE: No loss of smell, no epistaxis or postnasal drip.MOUTH & THROAT: No hoarseness or change in voice. No excessive gum bleeding.NODES: Denies swollen glands.  CHEST: Denies MEDINA, cyanosis,  wheezing, cough and sputum production.  CARDIOVASCULAR: Denies chest pain, PND, orthopnea or reduced exercise tolerance.  ABDOMEN: Appetite fine. No weight loss. Denies diarrhea, abdominal pain, hematemesis or blood in stool.  URINARY: No flank pain, dysuria or hematuria.  PERIPHERAL VASCULAR: No claudication or cyanosis.  MUSCULOSKELETAL: See above.  NEUROLOGIC: No history of seizures, paralysis, alteration of gait or coordination.  PE:   HEAD: Normocephalic, atraumatic.EYES: PERRL. EOMI.   EARS: TM's intact. Light reflex normal. No retraction or perforation.   NOSE: Mucosa pink. Airway clear.MOUTH & THROAT: No tonsillar enlargement. No pharyngeal erythema or exudate. No stridor.  NODES: No cervical, axillary or inguinal lymph node enlargement.  CHEST: Lungs clear to auscultation.  CARDIOVASCULAR: Normal S1, S2. No rubs, murmurs or gallops.  ABDOMEN: Bowel sounds normal. Not distended. Soft. No tenderness or masses.  MUSCULOSKELETAL: No palpable abnormality  NEUROLOGIC: Cranial Nerves: II-XII grossly intact.  Motor: 5/5 strength major flexors/extensors.  DTR's: Knees, Ankles 2+ and equal bilaterally; downgoing toes.  Sensory: Intact to light touch distally.  Gait & Posture: Normal gait and fine motion. No cerebellar signs.     Impression:Routine health check  Plan:Lab eval  Rec diet and ex recs  Rev age appropriate screenings

## 2017-07-31 ENCOUNTER — PATIENT MESSAGE (OUTPATIENT)
Dept: FAMILY MEDICINE | Facility: CLINIC | Age: 27
End: 2017-07-31

## 2017-08-02 RX ORDER — BUTALBITAL, ACETAMINOPHEN AND CAFFEINE 50; 325; 40 MG/1; MG/1; MG/1
1 TABLET ORAL 2 TIMES DAILY PRN
Qty: 60 TABLET | Refills: 0 | Status: SHIPPED | OUTPATIENT
Start: 2017-08-02 | End: 2017-10-30 | Stop reason: SDUPTHER

## 2017-08-02 NOTE — TELEPHONE ENCOUNTER
I'd like her to taper off and stop using the fioricet-dispinsing #60 today which she can use to taper off over next 6 weeks but use exedrine fopr headaches

## 2017-08-08 ENCOUNTER — TELEPHONE (OUTPATIENT)
Dept: OBSTETRICS AND GYNECOLOGY | Facility: CLINIC | Age: 27
End: 2017-08-08

## 2017-08-08 ENCOUNTER — PATIENT MESSAGE (OUTPATIENT)
Dept: OBSTETRICS AND GYNECOLOGY | Facility: CLINIC | Age: 27
End: 2017-08-08

## 2017-08-08 NOTE — TELEPHONE ENCOUNTER
Spoke with patient, she is going to schedule an appt on her kavita to see dr bradshaw to discuss her options

## 2017-08-14 RX ORDER — GABAPENTIN 800 MG/1
TABLET ORAL
Qty: 90 TABLET | Refills: 12 | Status: SHIPPED | OUTPATIENT
Start: 2017-08-14 | End: 2018-01-11

## 2017-08-18 ENCOUNTER — TELEPHONE (OUTPATIENT)
Dept: FAMILY MEDICINE | Facility: CLINIC | Age: 27
End: 2017-08-18

## 2017-08-18 NOTE — TELEPHONE ENCOUNTER
----- Message from Antonia Samuel sent at 8/18/2017  9:41 AM CDT -----  Contact: Bintarosetta antunez  Calling concerning changing patient's appointment. Please call patient @ [phone]. Thanks, kvng

## 2017-09-05 RX ORDER — BUTALBITAL, ACETAMINOPHEN AND CAFFEINE 50; 325; 40 MG/1; MG/1; MG/1
1 TABLET ORAL 2 TIMES DAILY PRN
Qty: 60 TABLET | Refills: 0 | Status: CANCELLED | OUTPATIENT
Start: 2017-09-05

## 2017-09-06 ENCOUNTER — PATIENT MESSAGE (OUTPATIENT)
Dept: OBSTETRICS AND GYNECOLOGY | Facility: CLINIC | Age: 27
End: 2017-09-06

## 2017-10-03 ENCOUNTER — PATIENT MESSAGE (OUTPATIENT)
Dept: FAMILY MEDICINE | Facility: CLINIC | Age: 27
End: 2017-10-03

## 2017-10-05 ENCOUNTER — CLINICAL SUPPORT (OUTPATIENT)
Dept: FAMILY MEDICINE | Facility: CLINIC | Age: 27
End: 2017-10-05
Payer: MEDICAID

## 2017-10-05 DIAGNOSIS — Z30.9 ENCOUNTER FOR CONTRACEPTIVE MANAGEMENT, UNSPECIFIED TYPE: Primary | ICD-10-CM

## 2017-10-05 PROCEDURE — 96372 THER/PROPH/DIAG INJ SC/IM: CPT | Mod: PBBFAC,PO

## 2017-10-05 PROCEDURE — 99999 PR PBB SHADOW E&M-EST. PATIENT-LVL II: CPT | Mod: PBBFAC,,,

## 2017-10-05 PROCEDURE — 99212 OFFICE O/P EST SF 10 MIN: CPT | Mod: PBBFAC,PO

## 2017-10-05 RX ADMIN — MEDROXYPROGESTERONE ACETATE 150 MG: 150 INJECTION, SUSPENSION INTRAMUSCULAR at 08:10

## 2017-10-30 ENCOUNTER — PATIENT MESSAGE (OUTPATIENT)
Dept: OBSTETRICS AND GYNECOLOGY | Facility: CLINIC | Age: 27
End: 2017-10-30

## 2017-10-30 ENCOUNTER — PATIENT MESSAGE (OUTPATIENT)
Dept: FAMILY MEDICINE | Facility: CLINIC | Age: 27
End: 2017-10-30

## 2017-10-30 DIAGNOSIS — Z86.69 HX OF MIGRAINE HEADACHES: ICD-10-CM

## 2017-10-30 DIAGNOSIS — G43.709 CHRONIC MIGRAINE WITHOUT AURA WITHOUT STATUS MIGRAINOSUS, NOT INTRACTABLE: Primary | ICD-10-CM

## 2017-10-30 RX ORDER — BUTALBITAL, ACETAMINOPHEN AND CAFFEINE 50; 325; 40 MG/1; MG/1; MG/1
1 TABLET ORAL 2 TIMES DAILY PRN
Qty: 60 TABLET | Refills: 0 | Status: SHIPPED | OUTPATIENT
Start: 2017-10-30 | End: 2017-12-11 | Stop reason: SDUPTHER

## 2017-10-30 RX ORDER — BUTALBITAL, ACETAMINOPHEN AND CAFFEINE 50; 325; 40 MG/1; MG/1; MG/1
1 TABLET ORAL 2 TIMES DAILY PRN
Qty: 60 TABLET | Refills: 0 | Status: CANCELLED | OUTPATIENT
Start: 2017-10-30

## 2017-11-01 ENCOUNTER — PATIENT MESSAGE (OUTPATIENT)
Dept: FAMILY MEDICINE | Facility: CLINIC | Age: 27
End: 2017-11-01

## 2017-11-01 RX ORDER — LIDOCAINE 50 MG/G
1 PATCH TOPICAL DAILY
Qty: 30 PATCH | Refills: 2 | Status: SHIPPED | OUTPATIENT
Start: 2017-11-01 | End: 2018-01-14

## 2017-11-02 ENCOUNTER — PATIENT MESSAGE (OUTPATIENT)
Dept: FAMILY MEDICINE | Facility: CLINIC | Age: 27
End: 2017-11-02

## 2017-11-07 ENCOUNTER — OFFICE VISIT (OUTPATIENT)
Dept: OBSTETRICS AND GYNECOLOGY | Facility: CLINIC | Age: 27
End: 2017-11-07
Payer: MEDICAID

## 2017-11-07 ENCOUNTER — PATIENT MESSAGE (OUTPATIENT)
Dept: OBSTETRICS AND GYNECOLOGY | Facility: CLINIC | Age: 27
End: 2017-11-07

## 2017-11-07 VITALS — BODY MASS INDEX: 25.75 KG/M2 | HEIGHT: 64 IN | WEIGHT: 150.81 LBS

## 2017-11-07 DIAGNOSIS — R63.5 WEIGHT GAIN DUE TO MEDICATION: ICD-10-CM

## 2017-11-07 DIAGNOSIS — T50.905A WEIGHT GAIN DUE TO MEDICATION: ICD-10-CM

## 2017-11-07 DIAGNOSIS — Z01.419 GYNECOLOGIC EXAM NORMAL: Primary | ICD-10-CM

## 2017-11-07 PROCEDURE — 87624 HPV HI-RISK TYP POOLED RSLT: CPT

## 2017-11-07 PROCEDURE — 99999 PR PBB SHADOW E&M-EST. PATIENT-LVL III: CPT | Mod: PBBFAC,,, | Performed by: SPECIALIST

## 2017-11-07 PROCEDURE — 99213 OFFICE O/P EST LOW 20 MIN: CPT | Mod: PBBFAC,PN | Performed by: SPECIALIST

## 2017-11-07 PROCEDURE — 88175 CYTOPATH C/V AUTO FLUID REDO: CPT

## 2017-11-07 PROCEDURE — 99395 PREV VISIT EST AGE 18-39: CPT | Mod: S$PBB,,, | Performed by: SPECIALIST

## 2017-11-07 RX ORDER — TRAMADOL HYDROCHLORIDE 50 MG/1
50 TABLET ORAL EVERY 6 HOURS PRN
Qty: 15 TABLET | Refills: 0 | Status: SHIPPED | OUTPATIENT
Start: 2017-11-07 | End: 2018-01-04

## 2017-11-07 NOTE — PROGRESS NOTES
25 yo WF presents for annual gyn evaluation. In addition, pt states she has gained weight while on DPMA and desires trial of mirena IUD. I discussed risks and benefits and altrenatives to IUD, including BTL. Pt next DPMA January 2018.  Past Medical History:   Diagnosis Date    Abnormal Pap smear     ALLERGIC RHINITIS     Anemia     Chronic back pain     Drug-seeking behavior 4/16/2012    Epilepsy     Hypertension     Opioid dependence 4/16/2012    Seizures     last-10/2011, EEG-normal    Urinary tract infection        Past Surgical History:   Procedure Laterality Date    CHOLECYSTECTOMY      VAGINAL DELIVERY      times 4    vestibulectomy      WISDOM TOOTH EXTRACTION         Family History   Problem Relation Age of Onset    Breast cancer Neg Hx     Ovarian cancer Neg Hx        Social History     Social History    Marital status: Single     Spouse name: N/A    Number of children: N/A    Years of education: N/A     Social History Main Topics    Smoking status: Current Every Day Smoker     Packs/day: 0.25     Years: 5.00    Smokeless tobacco: Never Used    Alcohol use Yes      Comment: occasional    Drug use: No    Sexual activity: Not Currently     Birth control/ protection: Injection     Other Topics Concern    None     Social History Narrative    None       Current Outpatient Prescriptions   Medication Sig Dispense Refill    butalbital-acetaminophen-caffeine -40 mg (FIORICET, ESGIC) -40 mg per tablet Take 1 tablet by mouth 2 (two) times daily as needed. 60 tablet 0    citalopram (CELEXA) 40 MG tablet Take 1 tablet (40 mg total) by mouth once daily. 90 tablet 3    dicyclomine (BENTYL) 20 mg tablet Take 20 mg by mouth every 6 (six) hours.      gabapentin (NEURONTIN) 800 MG tablet TAKE ONE TABLET BY MOUTH THREE TIMES DAILY 90 tablet 12    ibuprofen (ADVIL,MOTRIN) 800 MG tablet Take 1 tablet (800 mg total) by mouth 3 (three) times daily. 90 tablet 11    levetiracetam (KEPPRA)  500 MG Tab Take 2 tablets (1,000 mg total) by mouth 2 (two) times daily. 360 tablet 3    ondansetron (ZOFRAN) 4 MG tablet TAKE ONE TABLET BY MOUTH EVERY 6 HOURS AS NEEDED FOR NAUSEA 20 tablet 1    ranitidine (ZANTAC) 150 MG tablet Take 1 tablet (150 mg total) by mouth 2 (two) times daily. 60 tablet 11    lidocaine (LIDODERM) 5 % Place 1 patch onto the skin once daily. Remove & Discard patch within 12 hours or as directed by MD 30 patch 2    trazodone (DESYREL) 150 MG tablet Take 1 tablet (150 mg total) by mouth every evening. 30 tablet 11     Current Facility-Administered Medications   Medication Dose Route Frequency Provider Last Rate Last Dose    medroxyPROGESTERone (DEPO-PROVERA) injection 150 mg  150 mg Intramuscular Q90 Days Kristian Landaverde MD   150 mg at 10/05/17 0849       Review of patient's allergies indicates:   Allergen Reactions    Darvocet a500 [propoxyphene n-acetaminophen]     Phenytoin sodium extended Other (See Comments)       Review of System:   General: no chills, fever, night sweats, POSITIVE GAIN  Psychological: no depression or suicidal ideation  Breasts: no new or changing breast lumps, nipple discharge or masses.  Respiratory: no cough, shortness of breath, or wheezing  Cardiovascular: no chest pain or dyspnea on exertion  Gastrointestinal: no abdominal pain, change in bowel habits, or black or bloody stools  Genito-Urinary: no incontinence, urinary frequency/urgency or vulvar/vaginal symptoms, pelvic pain or abnormal vaginal bleeding.  Musculoskeletal: no gait disturbance or muscular weakness    General Appearance:  Alert, cooperative, no distress, appears stated age   Head:  Normocephalic, without obvious abnormality, atraumatic   Eyes:  PERRL, conjunctiva/corneas clear, EOM's intact, fundi benign, both eyes   Ears:  Normal TM's and external ear canals, both ears   Nose: Nares normal, septum midline,mucosa normal, no drainage or sinus tenderness   Throat: Lips, mucosa, and tongue  normal; teeth and gums normal   Neck: Supple, symmetrical, trachea midline, no adenopathy;  thyroid: not enlarged, symmetric, no tenderness/mass/nodules; no carotid bruit or JVD   Back:   Symmetric, no curvature, ROM normal, no CVA tenderness   Lungs:   Clear to auscultation bilaterally, respirations unlabored   Breasts:  No masses or tenderness   Heart:  Regular rate and rhythm, S1 and S2 normal, no murmur, rub, or gallop   Abdomen:   Soft, non-tender, bowel sounds active all four quadrants,  no masses, no organomegaly    Genitourinary:   External rectal exam shows no thrombosed external hemorrhoids.   Pelvic exam was performed with patient supine.  No labial fusion.  There is no rash, lesion or injury on the right labia.  There is no rash, lesion or injury on the left labia.  No bleeding and no signs of injury around the vaginal introitus, urethra is without lesions and well supported. The cervix is visualized with no discharge, lesions or friability.  No vaginal discharge found.  No significant Cystocele, Enterocele or rectocele, and uterus well supported.  Bimanual exam:  The urethra is normal to palpation and there are no palpable vaginal wall masses.  Uterus is not deviated, not enlarged, not fixed, normal shape and not tender.  Cervix exhibits no motion tenderness.   Right adnexum displays no mass and no tenderness.  Left adnexum displays no mass and no tenderness.   Extremities: Extremities normal, atraumatic, no cyanosis or edema   Pulses: 2+ and symmetric   Skin: Skin color, texture, turgor normal, no rashes or lesions   Lymph nodes: Cervical, supraclavicular, and axillary nodes normal   Neurologic: Normal       PAP submitted    Plan Will check Mirena benefits and will plan on RTO [place] for placement and discontinue DPMA.

## 2017-11-13 ENCOUNTER — PATIENT MESSAGE (OUTPATIENT)
Dept: OBSTETRICS AND GYNECOLOGY | Facility: CLINIC | Age: 27
End: 2017-11-13

## 2017-11-13 LAB
HPV16 AG SPEC QL: NEGATIVE
HPV16+18+H RISK 12 DNA CVX-IMP: POSITIVE
HPV18 DNA SPEC QL NAA+PROBE: NEGATIVE

## 2017-11-14 ENCOUNTER — PATIENT MESSAGE (OUTPATIENT)
Dept: OBSTETRICS AND GYNECOLOGY | Facility: CLINIC | Age: 27
End: 2017-11-14

## 2017-11-14 NOTE — TELEPHONE ENCOUNTER
Dr Landaverde stated we can schedule.    Let pt know she maybe billed for the procedure.    Pt is requesting a colpo due to positive HPV although she has been told that her pap smear was normal. She states she is freaking out and would feel better if you did one.

## 2017-11-15 ENCOUNTER — PATIENT MESSAGE (OUTPATIENT)
Dept: OBSTETRICS AND GYNECOLOGY | Facility: CLINIC | Age: 27
End: 2017-11-15

## 2017-11-15 NOTE — TELEPHONE ENCOUNTER
Patient questions if she could do a pap every 6 months as a precautionary measure instead of doing the colpo . Please advise.

## 2017-11-28 ENCOUNTER — PATIENT MESSAGE (OUTPATIENT)
Dept: FAMILY MEDICINE | Facility: CLINIC | Age: 27
End: 2017-11-28

## 2017-11-28 ENCOUNTER — PATIENT MESSAGE (OUTPATIENT)
Dept: OBSTETRICS AND GYNECOLOGY | Facility: CLINIC | Age: 27
End: 2017-11-28

## 2017-11-28 RX ORDER — OSELTAMIVIR PHOSPHATE 45 MG/1
45 CAPSULE ORAL 2 TIMES DAILY
Qty: 10 CAPSULE | Refills: 0 | Status: SHIPPED | OUTPATIENT
Start: 2017-11-28 | End: 2017-12-03

## 2017-12-11 ENCOUNTER — PATIENT MESSAGE (OUTPATIENT)
Dept: OBSTETRICS AND GYNECOLOGY | Facility: CLINIC | Age: 27
End: 2017-12-11

## 2017-12-11 RX ORDER — BUTALBITAL, ACETAMINOPHEN AND CAFFEINE 50; 325; 40 MG/1; MG/1; MG/1
1 TABLET ORAL 2 TIMES DAILY PRN
Qty: 60 TABLET | Refills: 0 | Status: SHIPPED | OUTPATIENT
Start: 2017-12-11 | End: 2018-03-20 | Stop reason: SDUPTHER

## 2017-12-11 RX ORDER — BUTALBITAL, ACETAMINOPHEN AND CAFFEINE 50; 325; 40 MG/1; MG/1; MG/1
1 TABLET ORAL 2 TIMES DAILY PRN
Qty: 60 TABLET | Refills: 0 | Status: CANCELLED | OUTPATIENT
Start: 2017-12-11

## 2017-12-19 ENCOUNTER — PATIENT MESSAGE (OUTPATIENT)
Dept: OBSTETRICS AND GYNECOLOGY | Facility: CLINIC | Age: 27
End: 2017-12-19

## 2017-12-30 ENCOUNTER — PATIENT MESSAGE (OUTPATIENT)
Dept: OBSTETRICS AND GYNECOLOGY | Facility: CLINIC | Age: 27
End: 2017-12-30

## 2018-01-02 RX ORDER — TRAMADOL HYDROCHLORIDE 50 MG/1
50 TABLET ORAL EVERY 6 HOURS PRN
Qty: 15 TABLET | Refills: 0 | OUTPATIENT
Start: 2018-01-02 | End: 2019-01-02

## 2018-01-04 ENCOUNTER — OFFICE VISIT (OUTPATIENT)
Dept: OBSTETRICS AND GYNECOLOGY | Facility: CLINIC | Age: 28
End: 2018-01-04
Payer: MEDICAID

## 2018-01-04 VITALS — BODY MASS INDEX: 25.93 KG/M2 | WEIGHT: 151.88 LBS | HEIGHT: 64 IN

## 2018-01-04 DIAGNOSIS — Z30.430 ENCOUNTER FOR IUD INSERTION: Primary | ICD-10-CM

## 2018-01-04 LAB
B-HCG UR QL: NEGATIVE
CTP QC/QA: YES

## 2018-01-04 PROCEDURE — 81025 URINE PREGNANCY TEST: CPT | Mod: PBBFAC,PN | Performed by: SPECIALIST

## 2018-01-04 PROCEDURE — 58300 INSERT INTRAUTERINE DEVICE: CPT | Mod: S$PBB,,, | Performed by: SPECIALIST

## 2018-01-04 PROCEDURE — 99999 PR PBB SHADOW E&M-EST. PATIENT-LVL II: CPT | Mod: PBBFAC,,, | Performed by: SPECIALIST

## 2018-01-04 PROCEDURE — 99213 OFFICE O/P EST LOW 20 MIN: CPT | Mod: 25,S$PBB,, | Performed by: SPECIALIST

## 2018-01-04 PROCEDURE — 99212 OFFICE O/P EST SF 10 MIN: CPT | Mod: PBBFAC,PN | Performed by: SPECIALIST

## 2018-01-04 PROCEDURE — 58300 INSERT INTRAUTERINE DEVICE: CPT | Mod: PBBFAC,PN | Performed by: SPECIALIST

## 2018-01-04 RX ADMIN — LEVONORGESTREL 1 INTRA UTERINE DEVICE: 52 INTRAUTERINE DEVICE INTRAUTERINE at 10:01

## 2018-01-04 NOTE — PROGRESS NOTES
26 yo WF presents for Mirena IUD placement.  Past Medical History:   Diagnosis Date    Abnormal Pap smear     ALLERGIC RHINITIS     Anemia     Chronic back pain     Drug-seeking behavior 4/16/2012    Epilepsy     Hypertension     Opioid dependence 4/16/2012    Seizures     last-10/2011, EEG-normal    Urinary tract infection        Past Surgical History:   Procedure Laterality Date    CHOLECYSTECTOMY      VAGINAL DELIVERY      times 4    vestibulectomy      WISDOM TOOTH EXTRACTION         Family History   Problem Relation Age of Onset    Breast cancer Neg Hx     Ovarian cancer Neg Hx        Social History     Social History    Marital status: Single     Spouse name: N/A    Number of children: N/A    Years of education: N/A     Social History Main Topics    Smoking status: Current Every Day Smoker     Packs/day: 0.25     Years: 5.00    Smokeless tobacco: Never Used    Alcohol use Yes      Comment: occasional    Drug use: No    Sexual activity: Not Currently     Birth control/ protection: Injection     Other Topics Concern    Not on file     Social History Narrative    No narrative on file       Current Outpatient Prescriptions   Medication Sig Dispense Refill    butalbital-acetaminophen-caffeine -40 mg (FIORICET, ESGIC) -40 mg per tablet Take 1 tablet by mouth 2 (two) times daily as needed. 60 tablet 0    citalopram (CELEXA) 40 MG tablet Take 1 tablet (40 mg total) by mouth once daily. 90 tablet 3    dicyclomine (BENTYL) 20 mg tablet Take 20 mg by mouth every 6 (six) hours.      gabapentin (NEURONTIN) 800 MG tablet TAKE ONE TABLET BY MOUTH THREE TIMES DAILY 90 tablet 12    ibuprofen (ADVIL,MOTRIN) 800 MG tablet Take 1 tablet (800 mg total) by mouth 3 (three) times daily. 90 tablet 11    levetiracetam (KEPPRA) 500 MG Tab Take 2 tablets (1,000 mg total) by mouth 2 (two) times daily. 360 tablet 3    ondansetron (ZOFRAN) 4 MG tablet TAKE ONE TABLET BY MOUTH EVERY 6 HOURS AS  NEEDED FOR NAUSEA 20 tablet 1    ranitidine (ZANTAC) 150 MG tablet Take 1 tablet (150 mg total) by mouth 2 (two) times daily. 60 tablet 11    lidocaine (LIDODERM) 5 % Place 1 patch onto the skin once daily. Remove & Discard patch within 12 hours or as directed by MD 30 patch 2     Current Facility-Administered Medications   Medication Dose Route Frequency Provider Last Rate Last Dose    medroxyPROGESTERone (DEPO-PROVERA) injection 150 mg  150 mg Intramuscular Q90 Days Kristian Landaverde MD   150 mg at 10/05/17 0849       Review of patient's allergies indicates:   Allergen Reactions    Darvocet a500 [propoxyphene n-acetaminophen]     Phenytoin sodium extended Other (See Comments)       Review of System:   General: no chills, fever, night sweats, weight gain or weight loss  Psychological: no depression or suicidal ideation  Breasts: no new or changing breast lumps, nipple discharge or masses.  Respiratory: no cough, shortness of breath, or wheezing  Cardiovascular: no chest pain or dyspnea on exertion  Gastrointestinal: no abdominal pain, change in bowel habits, or black or bloody stools  Genito-Urinary: no incontinence, urinary frequency/urgency or vulvar/vaginal symptoms, pelvic pain or abnormal vaginal bleeding.  Musculoskeletal: no gait disturbance or muscular weakness    UPT negative      PRE-IUD PLACEMENT COUNSELING:  All contraceptive options were reviewed and the patient chooses an IUD.  The patient's history was reviewed and there are no contraindications to an IUD. The procedure and minimal risks of pain, bleeding, perforation and infection at the insertion and spontaneous expulsion within the first two weeks was discussed. The benefits of amenorrhea and no systemic side effects were explained. All questions were answered and the patient agrees to proceed. Consent was signed (scanned into computer).    Uterine Position: anteverted    PROCEDURE:  TIME OUT PERFORMED.  The cervix visualized with a  speculum.  A single tooth tenaculum placed on the anterior lip.  The uterus sounded to 7 cm using sterile technique.  A Mirena IUD was loaded and placed high in uterine fundus without difficulty using sterile technique.  The string was cut to 2cm length from exo cervix.  The tenaculum and speculum were removed. The patient tolerated the procedure well.  ASSESSMENT:  1. Contraception management / IUD insertion.V25.0.    POST IUD PLACEMENT COUNSELING:  Manage post IUD placement pain with NSAIDs, Tylenol or Rx per [employer].  IUD danger signs and how to check the strings.  Removal in 5 years for Mirena IUD     Counseling lasted approximately 15 minutes and all her questions were answered.  RTO 2 weeks for IUD check

## 2018-01-05 ENCOUNTER — PATIENT MESSAGE (OUTPATIENT)
Dept: OBSTETRICS AND GYNECOLOGY | Facility: CLINIC | Age: 28
End: 2018-01-05

## 2018-01-07 ENCOUNTER — PATIENT MESSAGE (OUTPATIENT)
Dept: OBSTETRICS AND GYNECOLOGY | Facility: CLINIC | Age: 28
End: 2018-01-07

## 2018-01-11 ENCOUNTER — OFFICE VISIT (OUTPATIENT)
Dept: OBSTETRICS AND GYNECOLOGY | Facility: CLINIC | Age: 28
End: 2018-01-11
Payer: MEDICAID

## 2018-01-11 VITALS
DIASTOLIC BLOOD PRESSURE: 74 MMHG | HEIGHT: 64 IN | WEIGHT: 150.81 LBS | SYSTOLIC BLOOD PRESSURE: 122 MMHG | BODY MASS INDEX: 25.75 KG/M2

## 2018-01-11 DIAGNOSIS — Z30.431 IUD CHECK UP: Primary | ICD-10-CM

## 2018-01-11 PROCEDURE — 99213 OFFICE O/P EST LOW 20 MIN: CPT | Mod: S$PBB,,, | Performed by: SPECIALIST

## 2018-01-11 PROCEDURE — 99213 OFFICE O/P EST LOW 20 MIN: CPT | Mod: PBBFAC,PN | Performed by: SPECIALIST

## 2018-01-11 PROCEDURE — 99999 PR PBB SHADOW E&M-EST. PATIENT-LVL III: CPT | Mod: PBBFAC,,, | Performed by: SPECIALIST

## 2018-01-11 RX ORDER — GABAPENTIN 600 MG/1
800 TABLET ORAL 3 TIMES DAILY
COMMUNITY
Start: 2018-01-03 | End: 2018-03-20 | Stop reason: DRUGHIGH

## 2018-01-11 RX ORDER — DIAZEPAM 5 MG/1
5 TABLET ORAL DAILY
COMMUNITY
Start: 2018-01-03 | End: 2020-01-06

## 2018-01-11 NOTE — PROGRESS NOTES
26 yo WF s/p IUD insertion presents for IUD check. Pt states initial vaginal bleeding has ceased. Denies F/C, N/V, Vaginal d/c.  Past Medical History:   Diagnosis Date    Abnormal Pap smear     ALLERGIC RHINITIS     Anemia     Chronic back pain     Drug-seeking behavior 4/16/2012    Epilepsy     Hypertension     Opioid dependence 4/16/2012    Seizures     last-10/2011, EEG-normal    Urinary tract infection        Past Surgical History:   Procedure Laterality Date    CHOLECYSTECTOMY      VAGINAL DELIVERY      times 4    vestibulectomy      WISDOM TOOTH EXTRACTION         Family History   Problem Relation Age of Onset    Breast cancer Neg Hx     Ovarian cancer Neg Hx        Social History     Social History    Marital status: Single     Spouse name: N/A    Number of children: N/A    Years of education: N/A     Social History Main Topics    Smoking status: Current Every Day Smoker     Packs/day: 0.25     Years: 5.00    Smokeless tobacco: Never Used    Alcohol use Yes      Comment: occasional    Drug use: No    Sexual activity: Not Currently     Birth control/ protection: Injection     Other Topics Concern    None     Social History Narrative    None       Current Outpatient Prescriptions   Medication Sig Dispense Refill    butalbital-acetaminophen-caffeine -40 mg (FIORICET, ESGIC) -40 mg per tablet Take 1 tablet by mouth 2 (two) times daily as needed. 60 tablet 0    citalopram (CELEXA) 40 MG tablet Take 1 tablet (40 mg total) by mouth once daily. 90 tablet 3    diazePAM (VALIUM) 5 MG tablet       dicyclomine (BENTYL) 20 mg tablet Take 20 mg by mouth every 6 (six) hours.      gabapentin (NEURONTIN) 600 MG tablet       ibuprofen (ADVIL,MOTRIN) 800 MG tablet Take 1 tablet (800 mg total) by mouth 3 (three) times daily. 90 tablet 11    levetiracetam (KEPPRA) 500 MG Tab Take 2 tablets (1,000 mg total) by mouth 2 (two) times daily. 360 tablet 3    lidocaine (LIDODERM) 5 % Place 1  patch onto the skin once daily. Remove & Discard patch within 12 hours or as directed by MD 30 patch 2    ondansetron (ZOFRAN) 4 MG tablet TAKE ONE TABLET BY MOUTH EVERY 6 HOURS AS NEEDED FOR NAUSEA 20 tablet 1    ranitidine (ZANTAC) 150 MG tablet Take 1 tablet (150 mg total) by mouth 2 (two) times daily. 60 tablet 11     Current Facility-Administered Medications   Medication Dose Route Frequency Provider Last Rate Last Dose    medroxyPROGESTERone (DEPO-PROVERA) injection 150 mg  150 mg Intramuscular Q90 Days Kristian Landaverde MD   150 mg at 10/05/17 0849       Review of patient's allergies indicates:   Allergen Reactions    Darvocet a500 [propoxyphene n-acetaminophen]     Phenytoin sodium extended Other (See Comments)       Review of System:   General: no chills, fever, night sweats, weight gain or weight loss  Psychological: no depression or suicidal ideation  Breasts: no new or changing breast lumps, nipple discharge or masses.  Respiratory: no cough, shortness of breath, or wheezing  Cardiovascular: no chest pain or dyspnea on exertion  Gastrointestinal: no abdominal pain, change in bowel habits, or black or bloody stools  Genito-Urinary: no incontinence, urinary frequency/urgency or vulvar/vaginal symptoms, pelvic pain or abnormal vaginal bleeding.  Musculoskeletal: no gait disturbance or muscular weakness    EXAM  VSS             Abdomen soft NT             Gyn  Cervix nonfriable Sutures visualized at cervical os    I discussed the above and IUD in place  RTO 1 year/prn

## 2018-01-12 ENCOUNTER — PATIENT MESSAGE (OUTPATIENT)
Dept: OBSTETRICS AND GYNECOLOGY | Facility: CLINIC | Age: 28
End: 2018-01-12

## 2018-01-13 ENCOUNTER — PATIENT MESSAGE (OUTPATIENT)
Dept: OBSTETRICS AND GYNECOLOGY | Facility: CLINIC | Age: 28
End: 2018-01-13

## 2018-01-14 ENCOUNTER — PATIENT MESSAGE (OUTPATIENT)
Dept: OBSTETRICS AND GYNECOLOGY | Facility: CLINIC | Age: 28
End: 2018-01-14

## 2018-01-15 ENCOUNTER — OFFICE VISIT (OUTPATIENT)
Dept: OBSTETRICS AND GYNECOLOGY | Facility: CLINIC | Age: 28
End: 2018-01-15
Payer: MEDICAID

## 2018-01-15 ENCOUNTER — PATIENT MESSAGE (OUTPATIENT)
Dept: OBSTETRICS AND GYNECOLOGY | Facility: CLINIC | Age: 28
End: 2018-01-15

## 2018-01-15 VITALS — WEIGHT: 153.69 LBS | HEIGHT: 64 IN | BODY MASS INDEX: 26.24 KG/M2

## 2018-01-15 DIAGNOSIS — T83.32XA DISPLACEMENT OF INTRAUTERINE CONTRACEPTIVE DEVICE, INITIAL ENCOUNTER: Primary | ICD-10-CM

## 2018-01-15 DIAGNOSIS — T83.9XXA IUD COMPLICATION: ICD-10-CM

## 2018-01-15 DIAGNOSIS — R10.2 PELVIC PAIN IN FEMALE: ICD-10-CM

## 2018-01-15 PROCEDURE — 99213 OFFICE O/P EST LOW 20 MIN: CPT | Mod: S$PBB,,, | Performed by: SPECIALIST

## 2018-01-15 PROCEDURE — 99999 PR PBB SHADOW E&M-EST. PATIENT-LVL II: CPT | Mod: PBBFAC,,, | Performed by: SPECIALIST

## 2018-01-15 PROCEDURE — 99212 OFFICE O/P EST SF 10 MIN: CPT | Mod: PBBFAC,PN | Performed by: SPECIALIST

## 2018-01-15 RX ORDER — LIDOCAINE HYDROCHLORIDE 10 MG/ML
1 INJECTION, SOLUTION EPIDURAL; INFILTRATION; INTRACAUDAL; PERINEURAL ONCE
Status: CANCELLED | OUTPATIENT
Start: 2018-01-15 | End: 2018-01-15

## 2018-01-15 NOTE — PROGRESS NOTES
26 yo WF presents for evaluation of nonspecific abdominal pain following IUD placement. Pt also has presented to ER for same complaint. Pt denies F/C, n/v, flank pain, menorrhagia. Origianally pt c/o unable to appreciate vaginal IUD strings. These were visualized on last visit. In ED, IUD visualized on XRAY but not on pelvic u/s.    Past Medical History:   Diagnosis Date    Abnormal Pap smear     ALLERGIC RHINITIS     Anemia     Chronic back pain     Drug-seeking behavior 4/16/2012    Epilepsy     GERD (gastroesophageal reflux disease)     Opioid dependence 4/16/2012    Pre-eclampsia     Seizures     last-10/2011, EEG-normal    Urinary tract infection        Past Surgical History:   Procedure Laterality Date    CHOLECYSTECTOMY      VAGINAL DELIVERY      times 4    vestibulectomy      WISDOM TOOTH EXTRACTION         Family History   Problem Relation Age of Onset    Breast cancer Neg Hx     Ovarian cancer Neg Hx        Social History     Social History    Marital status: Single     Spouse name: N/A    Number of children: N/A    Years of education: N/A     Social History Main Topics    Smoking status: Current Every Day Smoker     Packs/day: 0.25     Years: 5.00     Types: Cigarettes    Smokeless tobacco: Never Used    Alcohol use Yes      Comment: occasional    Drug use: Yes     Types: Hydrocodone    Sexual activity: Not Currently     Birth control/ protection: Injection     Other Topics Concern    Not on file     Social History Narrative    No narrative on file       Current Outpatient Prescriptions   Medication Sig Dispense Refill    butalbital-acetaminophen-caffeine -40 mg (FIORICET, ESGIC) -40 mg per tablet Take 1 tablet by mouth 2 (two) times daily as needed. 60 tablet 0    citalopram (CELEXA) 40 MG tablet Take 1 tablet (40 mg total) by mouth once daily. 90 tablet 3    diazePAM (VALIUM) 5 MG tablet Take 5 mg by mouth once daily.       gabapentin (NEURONTIN) 600 MG tablet  Take 600 mg by mouth 4 (four) times daily.       levetiracetam (KEPPRA) 500 MG Tab Take 2 tablets (1,000 mg total) by mouth 2 (two) times daily. 360 tablet 3    ondansetron (ZOFRAN) 4 MG tablet TAKE ONE TABLET BY MOUTH EVERY 6 HOURS AS NEEDED FOR NAUSEA 20 tablet 1    ranitidine (ZANTAC) 150 MG tablet Take 1 tablet (150 mg total) by mouth 2 (two) times daily. (Patient taking differently: Take 150 mg by mouth 2 (two) times daily as needed. ) 60 tablet 11    oxyCODONE-acetaminophen (PERCOCET) 5-325 mg per tablet Take 2 tablets by mouth every 4 (four) hours as needed for Pain. 20 tablet 0     No current facility-administered medications for this visit.        Review of patient's allergies indicates:   Allergen Reactions    Darvocet a500 [propoxyphene n-acetaminophen]     Phenytoin sodium extended Other (See Comments)       Review of System:   General: no chills, fever, night sweats, weight gain or weight loss  Psychological: no depression or suicidal ideation  Breasts: no new or changing breast lumps, nipple discharge or masses.  Respiratory: no cough, shortness of breath, or wheezing  Cardiovascular: no chest pain or dyspnea on exertion  Gastrointestinal: no abdominal pain, change in bowel habits, or black or bloody stools  Genito-Urinary: no incontinence, urinary frequency/urgency or vulvar/vaginal symptoms, POSITIVE NONSPECIFIC PELVIC PAIN  Musculoskeletal: no gait disturbance or muscular weakness    VSS  Pt A and O  And in NAD  Chest CTA  Abdomen soft, NO REBOUND NO GUARDING.    I discussed possible IUD displacement, migration outside uterine cavity and recommend hysteroscope and possible laparoscope to verify proper position.  I discussed the risks of IUD displacement and importance of locating and verifying proper position and thus I recommend hysteroscope and possible laparoscope.  I discussed the risks of bowel/bladder /ureteral injury, infection, bleeding. Pt voices understanding of the above and desires  to proceed. Will schedule this week and follow

## 2018-01-17 PROBLEM — T83.9XXA IUD COMPLICATION: Status: ACTIVE | Noted: 2018-01-17

## 2018-01-17 RX ORDER — ONDANSETRON 4 MG/1
TABLET, FILM COATED ORAL
Qty: 20 TABLET | Refills: 2 | Status: SHIPPED | OUTPATIENT
Start: 2018-01-17 | End: 2018-02-01

## 2018-01-22 RX ORDER — HYDROCODONE BITARTRATE AND ACETAMINOPHEN 5; 325 MG/1; MG/1
2 TABLET ORAL EVERY 4 HOURS PRN
Qty: 30 TABLET | Refills: 0 | OUTPATIENT
Start: 2018-01-22 | End: 2018-02-01

## 2018-01-24 ENCOUNTER — PATIENT MESSAGE (OUTPATIENT)
Dept: OBSTETRICS AND GYNECOLOGY | Facility: CLINIC | Age: 28
End: 2018-01-24

## 2018-01-24 RX ORDER — OXYCODONE AND ACETAMINOPHEN 5; 325 MG/1; MG/1
2 TABLET ORAL EVERY 4 HOURS PRN
Qty: 5 TABLET | Refills: 0 | Status: SHIPPED | OUTPATIENT
Start: 2018-01-24 | End: 2018-02-01

## 2018-01-26 ENCOUNTER — PATIENT MESSAGE (OUTPATIENT)
Dept: OBSTETRICS AND GYNECOLOGY | Facility: CLINIC | Age: 28
End: 2018-01-26

## 2018-01-29 DIAGNOSIS — R52 PAIN: Primary | ICD-10-CM

## 2018-01-29 RX ORDER — OXYCODONE AND ACETAMINOPHEN 5; 325 MG/1; MG/1
2 TABLET ORAL EVERY 4 HOURS PRN
Qty: 5 TABLET | Refills: 0 | OUTPATIENT
Start: 2018-01-29

## 2018-02-01 ENCOUNTER — PATIENT MESSAGE (OUTPATIENT)
Dept: OBSTETRICS AND GYNECOLOGY | Facility: CLINIC | Age: 28
End: 2018-02-01

## 2018-02-01 ENCOUNTER — OFFICE VISIT (OUTPATIENT)
Dept: OBSTETRICS AND GYNECOLOGY | Facility: CLINIC | Age: 28
End: 2018-02-01
Payer: MEDICAID

## 2018-02-01 ENCOUNTER — PATIENT MESSAGE (OUTPATIENT)
Dept: FAMILY MEDICINE | Facility: CLINIC | Age: 28
End: 2018-02-01

## 2018-02-01 ENCOUNTER — LAB VISIT (OUTPATIENT)
Dept: LAB | Facility: HOSPITAL | Age: 28
End: 2018-02-01
Attending: FAMILY MEDICINE
Payer: MEDICAID

## 2018-02-01 VITALS
HEIGHT: 64 IN | BODY MASS INDEX: 25.63 KG/M2 | SYSTOLIC BLOOD PRESSURE: 118 MMHG | DIASTOLIC BLOOD PRESSURE: 72 MMHG | WEIGHT: 150.13 LBS

## 2018-02-01 DIAGNOSIS — N91.2 ABSENT MENSES: ICD-10-CM

## 2018-02-01 DIAGNOSIS — Z30.9 ENCOUNTER FOR CONTRACEPTIVE MANAGEMENT, UNSPECIFIED TYPE: Primary | ICD-10-CM

## 2018-02-01 DIAGNOSIS — N91.2 ABSENT MENSES: Primary | ICD-10-CM

## 2018-02-01 LAB — HCG INTACT+B SERPL-ACNC: <1.2 MIU/ML

## 2018-02-01 PROCEDURE — 84702 CHORIONIC GONADOTROPIN TEST: CPT

## 2018-02-01 PROCEDURE — 3008F BODY MASS INDEX DOCD: CPT | Mod: ,,, | Performed by: SPECIALIST

## 2018-02-01 PROCEDURE — 99213 OFFICE O/P EST LOW 20 MIN: CPT | Mod: PBBFAC,PN | Performed by: SPECIALIST

## 2018-02-01 PROCEDURE — 36415 COLL VENOUS BLD VENIPUNCTURE: CPT | Mod: PO

## 2018-02-01 PROCEDURE — 99999 PR PBB SHADOW E&M-EST. PATIENT-LVL III: CPT | Mod: PBBFAC,,, | Performed by: SPECIALIST

## 2018-02-01 PROCEDURE — 99213 OFFICE O/P EST LOW 20 MIN: CPT | Mod: S$PBB,,, | Performed by: SPECIALIST

## 2018-02-01 RX ORDER — MEDROXYPROGESTERONE ACETATE 150 MG/ML
150 INJECTION, SUSPENSION INTRAMUSCULAR ONCE
Qty: 1 ML | Refills: 0 | Status: SHIPPED | OUTPATIENT
Start: 2018-02-01 | End: 2018-04-19

## 2018-02-01 RX ORDER — NORGESTIMATE AND ETHINYL ESTRADIOL 0.25-0.035
1 KIT ORAL DAILY
Qty: 30 TABLET | Refills: 11 | Status: SHIPPED | OUTPATIENT
Start: 2018-02-01 | End: 2018-03-20

## 2018-02-01 RX ORDER — MEDROXYPROGESTERONE ACETATE 150 MG/ML
150 INJECTION, SUSPENSION INTRAMUSCULAR ONCE
Qty: 1 ML | Refills: 0 | OUTPATIENT
Start: 2018-02-01 | End: 2018-02-01

## 2018-02-01 NOTE — PROGRESS NOTES
26 yo WF 2 weeks s/p Dx laproscope with removal of IUD. Pt doing well overall. Denies F/C, n/v, vaginal bleeding, d/c. Mild tolerable PO pain. 9 AGAINST ADVICE, PT WAS SEXUALLY ACTIVE WEEK PO)    Past Medical History:   Diagnosis Date    Abnormal Pap smear     ALLERGIC RHINITIS     Anemia     Chronic back pain     Drug-seeking behavior 4/16/2012    Epilepsy     GERD (gastroesophageal reflux disease)     Opioid dependence 4/16/2012    Pre-eclampsia     Seizures     last-10/2011, EEG-normal    Urinary tract infection        Past Surgical History:   Procedure Laterality Date    CHOLECYSTECTOMY      PELVIC LAPAROSCOPY      iud retrieval     VAGINAL DELIVERY      times 4    vestibulectomy      WISDOM TOOTH EXTRACTION         Family History   Problem Relation Age of Onset    Breast cancer Neg Hx     Ovarian cancer Neg Hx        Social History     Social History    Marital status: Single     Spouse name: N/A    Number of children: N/A    Years of education: N/A     Social History Main Topics    Smoking status: Current Every Day Smoker     Packs/day: 0.25     Years: 5.00     Types: Cigarettes    Smokeless tobacco: Never Used    Alcohol use Yes      Comment: occasional    Drug use: Yes     Types: Hydrocodone    Sexual activity: Not Currently     Birth control/ protection: Injection     Other Topics Concern    None     Social History Narrative    None       Current Outpatient Prescriptions   Medication Sig Dispense Refill    butalbital-acetaminophen-caffeine -40 mg (FIORICET, ESGIC) -40 mg per tablet Take 1 tablet by mouth 2 (two) times daily as needed. 60 tablet 0    citalopram (CELEXA) 40 MG tablet Take 1 tablet (40 mg total) by mouth once daily. 90 tablet 3    diazePAM (VALIUM) 5 MG tablet Take 5 mg by mouth once daily.       gabapentin (NEURONTIN) 600 MG tablet Take 600 mg by mouth 4 (four) times daily.       levetiracetam (KEPPRA) 500 MG Tab Take 2 tablets (1,000 mg total) by  mouth 2 (two) times daily. 360 tablet 3    ranitidine (ZANTAC) 150 MG tablet Take 1 tablet (150 mg total) by mouth 2 (two) times daily. (Patient taking differently: Take 150 mg by mouth 2 (two) times daily as needed. ) 60 tablet 11     No current facility-administered medications for this visit.        Review of patient's allergies indicates:   Allergen Reactions    Darvocet a500 [propoxyphene n-acetaminophen]     Phenytoin sodium extended Other (See Comments)       Review of System:   General: no chills, fever, night sweats, weight gain or weight loss  Psychological: no depression or suicidal ideation  Breasts: no new or changing breast lumps, nipple discharge or masses.  Respiratory: no cough, shortness of breath, or wheezing  Cardiovascular: no chest pain or dyspnea on exertion  Gastrointestinal: no abdominal pain, change in bowel habits, or black or bloody stools  Genito-Urinary: no incontinence, urinary frequency/urgency or vulvar/vaginal symptoms, pelvic pain or abnormal vaginal bleeding.  Musculoskeletal: no gait disturbance or muscular weakness    VSS  Abdomen soft, incisions well aprpox  No s/s infection    I discussed contraceptive options and pt desires OCP, howver, desires DPMA for initial start.  I will prescribe after negative BHCG  Pt to RTO prn

## 2018-02-01 NOTE — TELEPHONE ENCOUNTER
----- Message from Twyla Frausto sent at 2/1/2018 11:19 AM CST -----  Contact: Gus with [facility] at [phone]  Gus with [facility] at [phone], calling about the two Rx just sent over on this patient. Patient waiting. Please advise. Thanks.

## 2018-02-02 ENCOUNTER — PATIENT MESSAGE (OUTPATIENT)
Dept: OBSTETRICS AND GYNECOLOGY | Facility: CLINIC | Age: 28
End: 2018-02-02

## 2018-02-19 ENCOUNTER — PATIENT MESSAGE (OUTPATIENT)
Dept: OBSTETRICS AND GYNECOLOGY | Facility: CLINIC | Age: 28
End: 2018-02-19

## 2018-02-19 ENCOUNTER — PATIENT MESSAGE (OUTPATIENT)
Dept: FAMILY MEDICINE | Facility: CLINIC | Age: 28
End: 2018-02-19

## 2018-02-20 ENCOUNTER — OFFICE VISIT (OUTPATIENT)
Dept: FAMILY MEDICINE | Facility: CLINIC | Age: 28
End: 2018-02-20
Payer: MEDICAID

## 2018-02-20 VITALS
DIASTOLIC BLOOD PRESSURE: 60 MMHG | TEMPERATURE: 98 F | BODY MASS INDEX: 25.93 KG/M2 | HEART RATE: 76 BPM | SYSTOLIC BLOOD PRESSURE: 100 MMHG | WEIGHT: 151.88 LBS | HEIGHT: 64 IN

## 2018-02-20 DIAGNOSIS — S80.10XA CONTUSION OF LOWER LEG, UNSPECIFIED LATERALITY, INITIAL ENCOUNTER: Primary | ICD-10-CM

## 2018-02-20 PROCEDURE — 3008F BODY MASS INDEX DOCD: CPT | Mod: ,,, | Performed by: FAMILY MEDICINE

## 2018-02-20 PROCEDURE — 99999 PR PBB SHADOW E&M-EST. PATIENT-LVL III: CPT | Mod: PBBFAC,,, | Performed by: FAMILY MEDICINE

## 2018-02-20 PROCEDURE — 99213 OFFICE O/P EST LOW 20 MIN: CPT | Mod: PBBFAC,PO | Performed by: FAMILY MEDICINE

## 2018-02-20 PROCEDURE — 99213 OFFICE O/P EST LOW 20 MIN: CPT | Mod: S$PBB,,, | Performed by: FAMILY MEDICINE

## 2018-02-20 RX ORDER — DICLOFENAC SODIUM 75 MG/1
75 TABLET, DELAYED RELEASE ORAL 2 TIMES DAILY
Qty: 60 TABLET | Refills: 2 | Status: SHIPPED | OUTPATIENT
Start: 2018-02-20 | End: 2018-03-20

## 2018-02-20 NOTE — PROGRESS NOTES
The patient presents with tender painful ankle  for the past 3 days    but no fever.  ROS:  General: No fever or sig wt change  HEENT:No other PND eye pain or dc  Respiratory: No cough wheezing  PE: vital signs noted  HEENT: Normocephalic,with no recent trauma,PERRLA,EOMI,conjunctiva injected with no exudate.Nasopharynx is injected and edematous.External otic canal edematous and injected TM dull  Neck:Supple without adenopathy  Chest:Clear bilateral breath sounds   Heart:Regular rhthym without murmer  Abdomen:Soft, non tender,no masses, no hepatosplenomegaly  Extremeties Twnder bialt ankle knee wo deforminty Neurologic:Grossly within normal limits     Impression:Contusions   Plan:  Ice rest and diclofenac

## 2018-02-26 ENCOUNTER — PATIENT MESSAGE (OUTPATIENT)
Dept: OBSTETRICS AND GYNECOLOGY | Facility: CLINIC | Age: 28
End: 2018-02-26

## 2018-03-06 ENCOUNTER — PATIENT MESSAGE (OUTPATIENT)
Dept: OBSTETRICS AND GYNECOLOGY | Facility: CLINIC | Age: 28
End: 2018-03-06

## 2018-03-16 ENCOUNTER — PATIENT MESSAGE (OUTPATIENT)
Dept: FAMILY MEDICINE | Facility: CLINIC | Age: 28
End: 2018-03-16

## 2018-03-20 ENCOUNTER — OFFICE VISIT (OUTPATIENT)
Dept: FAMILY MEDICINE | Facility: CLINIC | Age: 28
End: 2018-03-20
Payer: MEDICAID

## 2018-03-20 VITALS
HEART RATE: 80 BPM | HEIGHT: 64 IN | BODY MASS INDEX: 25.75 KG/M2 | WEIGHT: 150.81 LBS | TEMPERATURE: 98 F | DIASTOLIC BLOOD PRESSURE: 84 MMHG | SYSTOLIC BLOOD PRESSURE: 122 MMHG

## 2018-03-20 DIAGNOSIS — Z76.0 MEDICATION REFILL: ICD-10-CM

## 2018-03-20 DIAGNOSIS — K52.9 GASTROENTERITIS: Primary | ICD-10-CM

## 2018-03-20 DIAGNOSIS — J02.9 PHARYNGITIS, UNSPECIFIED ETIOLOGY: ICD-10-CM

## 2018-03-20 DIAGNOSIS — R19.7 DIARRHEA, UNSPECIFIED TYPE: ICD-10-CM

## 2018-03-20 LAB — DEPRECATED S PYO AG THROAT QL EIA: NEGATIVE

## 2018-03-20 PROCEDURE — 87880 STREP A ASSAY W/OPTIC: CPT | Mod: PO

## 2018-03-20 PROCEDURE — 99214 OFFICE O/P EST MOD 30 MIN: CPT | Mod: PBBFAC,PO | Performed by: NURSE PRACTITIONER

## 2018-03-20 PROCEDURE — 87081 CULTURE SCREEN ONLY: CPT

## 2018-03-20 PROCEDURE — 99999 PR PBB SHADOW E&M-EST. PATIENT-LVL IV: CPT | Mod: PBBFAC,,, | Performed by: NURSE PRACTITIONER

## 2018-03-20 PROCEDURE — 99213 OFFICE O/P EST LOW 20 MIN: CPT | Mod: S$PBB,,, | Performed by: NURSE PRACTITIONER

## 2018-03-20 RX ORDER — ONDANSETRON HYDROCHLORIDE 8 MG/1
8 TABLET, FILM COATED ORAL EVERY 8 HOURS PRN
Qty: 15 TABLET | Refills: 0 | Status: SHIPPED | OUTPATIENT
Start: 2018-03-20 | End: 2018-03-25

## 2018-03-20 RX ORDER — DIPHENOXYLATE HYDROCHLORIDE AND ATROPINE SULFATE 2.5; .025 MG/1; MG/1
1 TABLET ORAL 4 TIMES DAILY PRN
Qty: 10 TABLET | Refills: 0 | Status: SHIPPED | OUTPATIENT
Start: 2018-03-20 | End: 2018-03-22 | Stop reason: SDUPTHER

## 2018-03-20 RX ORDER — BUTALBITAL, ACETAMINOPHEN AND CAFFEINE 50; 325; 40 MG/1; MG/1; MG/1
1 TABLET ORAL 2 TIMES DAILY PRN
Qty: 60 TABLET | Refills: 0 | Status: SHIPPED | OUTPATIENT
Start: 2018-03-20 | End: 2019-01-16 | Stop reason: SDUPTHER

## 2018-03-20 RX ORDER — GABAPENTIN 800 MG/1
800 TABLET ORAL 4 TIMES DAILY
COMMUNITY
Start: 2018-03-09 | End: 2019-09-20 | Stop reason: SDUPTHER

## 2018-03-20 NOTE — PROGRESS NOTES
Subjective:       Patient ID: Sara Sanford is a 27 y.o. female.    Chief Complaint: Diarrhea; Sore Throat; Nausea; and Emesis    Diarrhea    This is a new problem. The current episode started in the past 7 days. The problem occurs 2 to 4 times per day. The problem has been unchanged. The patient states that diarrhea does not awaken her from sleep. Associated symptoms include vomiting (resolved). Pertinent negatives include no abdominal pain, arthralgias, bloating, chills, coughing, fever, headaches, increased  flatus, myalgias, sweats, URI or weight loss. Nothing aggravates the symptoms. There are no known risk factors. She has tried anti-motility drug for the symptoms. The treatment provided no relief. There is no history of bowel resection, inflammatory bowel disease, irritable bowel syndrome, malabsorption, a recent abdominal surgery or short gut syndrome.   Sore Throat    This is a new problem. The current episode started in the past 7 days. The problem has been resolved. There has been no fever. The pain is mild. Associated symptoms include diarrhea and vomiting (resolved). Pertinent negatives include no abdominal pain, congestion, coughing, drooling, ear discharge, ear pain, headaches, hoarse voice, plugged ear sensation, neck pain, shortness of breath, stridor, swollen glands or trouble swallowing. She has had exposure to strep. She has had no exposure to mono. She has tried nothing for the symptoms. The treatment provided significant relief.   Pt requests fioricet refill; states works well for migraines; denies any side effects.  Past Medical History:   Diagnosis Date    Abnormal Pap smear     ALLERGIC RHINITIS     Anemia     Chronic back pain     Drug-seeking behavior 4/16/2012    Epilepsy     GERD (gastroesophageal reflux disease)     Opioid dependence 4/16/2012    Pre-eclampsia     Seizures     last-10/2011, EEG-normal    Urinary tract infection      Social History     Social History     Marital status: Single     Spouse name: N/A    Number of children: N/A    Years of education: N/A     Occupational History    Not on file.     Social History Main Topics    Smoking status: Current Every Day Smoker     Packs/day: 0.25     Years: 5.00     Types: Cigarettes    Smokeless tobacco: Never Used    Alcohol use Yes      Comment: occasional    Drug use: Yes     Types: Hydrocodone    Sexual activity: Not Currently     Birth control/ protection: Injection     Social History Narrative    No narrative on file     Past Surgical History:   Procedure Laterality Date    CHOLECYSTECTOMY      PELVIC LAPAROSCOPY      iud retrieval     VAGINAL DELIVERY      times 4    vestibulectomy      WISDOM TOOTH EXTRACTION         Review of Systems   Constitutional: Negative.  Negative for chills, fever and weight loss.   HENT: Positive for sore throat. Negative for congestion, drooling, ear discharge, ear pain, hoarse voice and trouble swallowing.    Eyes: Negative.    Respiratory: Negative.  Negative for cough, shortness of breath and stridor.    Cardiovascular: Negative.    Gastrointestinal: Positive for diarrhea, nausea and vomiting (resolved). Negative for abdominal pain, bloating and flatus.   Endocrine: Negative.    Genitourinary: Negative.    Musculoskeletal: Negative.  Negative for arthralgias, myalgias and neck pain.   Skin: Negative.    Allergic/Immunologic: Negative.    Neurological: Negative.  Negative for headaches.   Psychiatric/Behavioral: Negative.        Objective:      Physical Exam   Constitutional: She is oriented to person, place, and time. She appears well-developed and well-nourished.   HENT:   Head: Normocephalic.   Right Ear: Hearing, tympanic membrane, external ear and ear canal normal.   Left Ear: Hearing, tympanic membrane, external ear and ear canal normal.   Nose: Nose normal.   Mouth/Throat: Uvula is midline, oropharynx is clear and moist and mucous membranes are normal.   Eyes:  Conjunctivae are normal. Pupils are equal, round, and reactive to light.   Neck: Normal range of motion. Neck supple.   Cardiovascular: Normal rate, regular rhythm and normal heart sounds.    Pulmonary/Chest: Effort normal and breath sounds normal.   Abdominal: Soft. Bowel sounds are normal. There is generalized tenderness. There is no rigidity, no rebound, no guarding, no CVA tenderness, no tenderness at McBurney's point and negative Melo's sign.   Musculoskeletal: Normal range of motion.   Neurological: She is alert and oriented to person, place, and time.   Skin: Skin is warm and dry. Capillary refill takes 2 to 3 seconds.   Psychiatric: She has a normal mood and affect. Her behavior is normal. Judgment and thought content normal.   Nursing note and vitals reviewed.      Assessment:       1. Gastroenteritis    2. Pharyngitis, unspecified etiology    3. Medication refill    4. Diarrhea, unspecified type        Plan:           Sara was seen today for diarrhea, sore throat, nausea and emesis.    Diagnoses and all orders for this visit:    Gastroenteritis  Diarrhea, unspecified type  -     Stool Exam-Ova,Cysts,Parasites; Future  -     CULTURE, STOOL; Future  -     Giardia / Cryptosporidum, EIA; Future  -     Rotavirus antigen, stool; Future  -     WBC, Stool; Future  -     Occult blood x 1, stool; Future        -     ondansetron (ZOFRAN) 8 MG tablet; Take 1 tablet (8 mg total) by mouth every 8 (eight) hours as needed.        Lomotil request sent to PCP to approve        Hydrate well         Report to ER immediately if symptoms worsen    Pharyngitis, unspecified etiology  -     Throat Screen, Rapid    Medication refill  -     butalbital-acetaminophen-caffeine -40 mg (FIORICET, ESGIC) -40 mg per tablet; Take 1 tablet by mouth 2 (two) times daily as needed.

## 2018-03-22 RX ORDER — DIPHENOXYLATE HYDROCHLORIDE AND ATROPINE SULFATE 2.5; .025 MG/1; MG/1
TABLET ORAL
Qty: 10 TABLET | Refills: 0 | Status: SHIPPED | OUTPATIENT
Start: 2018-03-22 | End: 2018-04-19

## 2018-03-23 LAB — BACTERIA THROAT CULT: NORMAL

## 2018-03-23 RX ORDER — IBUPROFEN 800 MG/1
TABLET ORAL
Qty: 90 TABLET | Refills: 12 | Status: SHIPPED | OUTPATIENT
Start: 2018-03-23 | End: 2019-08-26 | Stop reason: SDUPTHER

## 2018-04-10 ENCOUNTER — PATIENT MESSAGE (OUTPATIENT)
Dept: OBSTETRICS AND GYNECOLOGY | Facility: CLINIC | Age: 28
End: 2018-04-10

## 2018-04-10 DIAGNOSIS — Z30.9 ENCOUNTER FOR CONTRACEPTIVE MANAGEMENT, UNSPECIFIED TYPE: Primary | ICD-10-CM

## 2018-04-10 RX ORDER — MEDROXYPROGESTERONE ACETATE 150 MG/ML
150 INJECTION, SUSPENSION INTRAMUSCULAR
Status: DISCONTINUED | OUTPATIENT
Start: 2018-04-19 | End: 2018-11-13

## 2018-04-16 ENCOUNTER — TELEPHONE (OUTPATIENT)
Dept: FAMILY MEDICINE | Facility: CLINIC | Age: 28
End: 2018-04-16

## 2018-04-16 NOTE — TELEPHONE ENCOUNTER
----- Message from Rosanna Mcrae sent at 4/16/2018 10:48 AM CDT -----  Contact: pt  Pt was returning nurse call regarding birth control shot.   [phone] (home) [phone] (work)

## 2018-04-16 NOTE — TELEPHONE ENCOUNTER
----- Message from Bree Ryan sent at 4/16/2018  9:39 AM CDT -----  Contact: mother   Would like to consult with  Nurse regarding birth control shot. Please call back at [phone].      Thanks,  Bree Ryan

## 2018-04-18 ENCOUNTER — PATIENT MESSAGE (OUTPATIENT)
Dept: FAMILY MEDICINE | Facility: CLINIC | Age: 28
End: 2018-04-18

## 2018-04-19 ENCOUNTER — OFFICE VISIT (OUTPATIENT)
Dept: FAMILY MEDICINE | Facility: CLINIC | Age: 28
End: 2018-04-19
Payer: MEDICAID

## 2018-04-19 VITALS
TEMPERATURE: 98 F | WEIGHT: 148.81 LBS | BODY MASS INDEX: 25.41 KG/M2 | SYSTOLIC BLOOD PRESSURE: 99 MMHG | HEIGHT: 64 IN | DIASTOLIC BLOOD PRESSURE: 55 MMHG | HEART RATE: 78 BPM

## 2018-04-19 DIAGNOSIS — J02.9 PHARYNGITIS, UNSPECIFIED ETIOLOGY: Primary | ICD-10-CM

## 2018-04-19 PROCEDURE — 99999 PR PBB SHADOW E&M-EST. PATIENT-LVL III: CPT | Mod: PBBFAC,,, | Performed by: NURSE PRACTITIONER

## 2018-04-19 PROCEDURE — 99213 OFFICE O/P EST LOW 20 MIN: CPT | Mod: PBBFAC,PO | Performed by: NURSE PRACTITIONER

## 2018-04-19 PROCEDURE — 99213 OFFICE O/P EST LOW 20 MIN: CPT | Mod: S$PBB,,, | Performed by: NURSE PRACTITIONER

## 2018-04-19 RX ORDER — DEXAMETHASONE SODIUM PHOSPHATE 4 MG/ML
8 INJECTION, SOLUTION INTRA-ARTICULAR; INTRALESIONAL; INTRAMUSCULAR; INTRAVENOUS; SOFT TISSUE
Status: COMPLETED | OUTPATIENT
Start: 2018-04-19 | End: 2018-04-19

## 2018-04-19 RX ORDER — FLUTICASONE PROPIONATE 50 MCG
2 SPRAY, SUSPENSION (ML) NASAL DAILY
Qty: 1 BOTTLE | Refills: 0 | Status: SHIPPED | OUTPATIENT
Start: 2018-04-19 | End: 2018-05-14

## 2018-04-19 RX ORDER — AMOXICILLIN AND CLAVULANATE POTASSIUM 875; 125 MG/1; MG/1
1 TABLET, FILM COATED ORAL EVERY 12 HOURS
Qty: 20 TABLET | Refills: 0 | Status: SHIPPED | OUTPATIENT
Start: 2018-04-19 | End: 2018-04-29

## 2018-04-19 RX ADMIN — DEXAMETHASONE SODIUM PHOSPHATE 8 MG: 4 INJECTION, SOLUTION INTRAMUSCULAR; INTRAVENOUS at 09:04

## 2018-04-19 NOTE — PATIENT INSTRUCTIONS
Increase fluid intake  Rest  Tylenol/Motrin as needed for headache/pain  Mucinex (guaifenesin) twice daily to loosen secretions  Humidified air  Lozenges and/or chloraseptic spray as needed for sore throat  Warm salt water gargles as needed for sore throat        When You Have a Sore Throat    A sore throat can be painful. There are many reasons why you may have a sore throat. Your healthcare provider will work with you to find the cause of your sore throat. He or she will also find the best treatment for you.  What causes a sore throat?  Sore throats can be caused or worsened by:  · Cold or flu viruses  · Bacteria  · Irritants such as tobacco smoke or air pollution  · Acid reflux  A healthy throat  The tonsils are on the sides of the throat near the base of the tongue. They collect viruses and bacteria and help fight infection. The throat (pharynx) is the passage for air. Mucus from the nasal cavity also moves down the passage.  An inflamed throat  The tonsils and pharynx can become inflamed due to a cold or flu virus. Postnasal drip (excess mucus draining from the nasal cavity) can irritate the throat. It can also make the throat or tonsils more likely to be infected by bacteria. Severe, untreated tonsillitis in children or adults can cause a pocket of pus (abscess) to form near the tonsil.  Your evaluation  A medical evaluation can help find the cause of your sore throat. It can also help your healthcare provider choose the best treatment for you. The evaluation may include a health history, physical exam, and diagnostic tests.  Health history  Your healthcare provider may ask you the following:  · How long has the sore throat lasted and how have you been treating it?  · Do you have any other symptoms, such as body aches, fever, or cough?  · Does your sore throat recur? If so, how often? How many days of school or work have you missed because of a sore throat?  · Do you have trouble eating or swallowing?  · Have  "you been told that you snore or have other sleep problems?  · Do you have bad breath?  · Do you cough up bad-tasting mucus?  Physical exam  During the exam, your healthcare provider checks your ears, nose, and throat for problems. He or she also checks for swelling in the neck, and may listen to your chest.  Possible tests  Other tests your healthcare provider may perform include:  · A throat swab to check for bacteria such as streptococcus (the bacteria that causes strep throat)  · A blood test to check for mononucleosis (a viral infection)  · A chest X-ray to rule out pneumonia, especially if you have a cough  Treating a sore throat  Treatment depends on many factors. What is the likely cause? Is the problem recent? Does it keep coming back? In many cases, the best thing to do is to treat the symptoms, rest, and let the problem heal itself. Antibiotics may help clear up some bacterial infections. For cases of severe or recurring tonsillitis, the tonsils may need to be removed.  Relieving your symptoms  · Dont smoke, and avoid secondhand smoke.  · For children, try throat sprays or Popsicles. Adults and older children may try lozenges.  · Drink warm liquids to soothe the throat and help thin mucus. Avoid alcohol, spicy foods, and acidic drinks such as orange juice. These can irritate the throat.  · Gargle with warm saltwater (1 teaspoon of salt to 8 ounces of warm water).  · Use a humidifier to keep air moist and relieve throat dryness.  · Try over-the-counter pain relievers such as acetaminophen or ibuprofen. Use as directed, and dont exceed the recommended dose. Dont give aspirin to children.   Are antibiotics needed?  If your sore throat is due to a bacterial infection, antibiotics may speed healing and prevent complications. Although group A streptococcus ("strep throat" or GAS) is the major treatable infection for a sore throat, GAS causes only 5% to 15% of sore throats in adults who seek medical care. Most " sore throats are caused by cold or flu viruses. And antibiotics dont treat viral illness. In fact, using antibiotics when theyre not needed may produce bacteria that are harder to kill. Your healthcare provider will prescribe antibiotics only if he or she thinks they are likely to help.  If antibiotics are prescribed  Take the medicine exactly as directed. Be sure to finish your prescription even if youre feeling better. And be sure to ask your healthcare provider or pharmacist what side effects are common and what to do about them.  Is surgery needed?  In some cases, tonsils need to be removed. This is often done as outpatient (same-day) surgery. Your healthcare provider may advise removing the tonsils in cases of:  · Several severe bouts of tonsillitis in a year. Severe episodes include those that lead to missed days of school or work, or that need to be treated with antibiotics.  · Tonsillitis that causes breathing problems during sleep  · Tonsillitis caused by food particles collecting in pouches in the tonsils (cryptic tonsillitis)  Call your healthcare provider if any of the following occur:  · Symptoms worsen, or new symptoms develop.  · Swollen tonsils make breathing difficult.  · The pain is severe enough to keep you from drinking liquids.  · A skin rash, hives, or wheezing develops. Any of these could signal an allergic reaction to antibiotics.  · Symptoms dont improve within a week.  · Symptoms dont improve within 2 to 3 days of starting antibiotics.   Date Last Reviewed: 10/1/2016  © 5695-9117 [employer]. [address] [place] [address], [place] [address]. All rights reserved. This information is not intended as a substitute for professional medical care. Always follow your healthcare professional's instructions.

## 2018-04-19 NOTE — PROGRESS NOTES
"Subjective:      Patient ID: Sara Sanford is a 27 y.o. female.    Chief Complaint: Sore Throat and Otalgia    Sore Throat    This is a new problem. The current episode started in the past 7 days. The problem has been gradually worsening. Neither side of throat is experiencing more pain than the other. Maximum temperature: subjective fever. The pain is at a severity of 8/10. The pain is moderate. Associated symptoms include coughing, ear pain, headaches, a hoarse voice and trouble swallowing (painful swallowing). Pertinent negatives include no congestion, shortness of breath or swollen glands. She has had no exposure to strep or mono. Treatments tried: magic mouthwash. The treatment provided mild relief.     Review of Systems   Constitutional: Positive for chills and fever (subjective fever). Negative for fatigue.   HENT: Positive for ear pain, hoarse voice, postnasal drip, sore throat and trouble swallowing (painful swallowing). Negative for congestion, rhinorrhea, sinus pain and sinus pressure.    Respiratory: Positive for cough. Negative for shortness of breath and wheezing.    Cardiovascular: Negative for chest pain, palpitations and leg swelling.   Gastrointestinal: Negative.    Skin: Negative for rash and wound.   Neurological: Positive for headaches. Negative for weakness and numbness.   Psychiatric/Behavioral: The patient is not nervous/anxious.        Objective:     BP (!) 99/55   Pulse 78   Temp 98.2 °F (36.8 °C)   Ht 5' 4" (1.626 m)   Wt 67.5 kg (148 lb 12.8 oz)   BMI 25.54 kg/m²     Physical Exam   Constitutional: She is oriented to person, place, and time. She appears well-developed and well-nourished.   HENT:   Head: Normocephalic.   Right Ear: A middle ear effusion (serous) is present.   Left Ear: A middle ear effusion (serous) is present.   Nose: Rhinorrhea present. No mucosal edema. Right sinus exhibits frontal sinus tenderness. Right sinus exhibits no maxillary sinus tenderness. Left sinus " exhibits frontal sinus tenderness. Left sinus exhibits no maxillary sinus tenderness.   Mouth/Throat: Posterior oropharyngeal edema and posterior oropharyngeal erythema present. No oropharyngeal exudate. Tonsils are 2+ on the right. Tonsils are 2+ on the left.   Eyes: Pupils are equal, round, and reactive to light.   Cardiovascular: Normal rate, regular rhythm and normal heart sounds.    Pulmonary/Chest: Effort normal and breath sounds normal. No respiratory distress. She has no decreased breath sounds. She has no wheezes. She has no rhonchi. She has no rales.   Lymphadenopathy:     She has no cervical adenopathy.   Neurological: She is alert and oriented to person, place, and time.   Skin: Skin is warm and dry. No rash noted.   Psychiatric: She has a normal mood and affect. Her behavior is normal. Judgment and thought content normal.   Vitals reviewed.    Assessment:     1. Pharyngitis, unspecified etiology        Plan:     Problem List Items Addressed This Visit     None      Visit Diagnoses     Pharyngitis, unspecified etiology    -  Primary      Increase fluid intake  Rest  Tylenol/Motrin as needed for headache/pain  Mucinex (guaifenesin) twice daily to loosen secretions  Humidified air  Lozenges and/or chloraseptic spray as needed for sore throat  Warm salt water gargles as needed for sore throat  Symptomatic care discussed and educational handout provided  Report to ER if symptoms worsen    Follow-up if symptoms worsen or fail to improve.

## 2018-04-20 ENCOUNTER — PATIENT MESSAGE (OUTPATIENT)
Dept: FAMILY MEDICINE | Facility: CLINIC | Age: 28
End: 2018-04-20

## 2018-04-20 RX ORDER — DIPHENOXYLATE HYDROCHLORIDE AND ATROPINE SULFATE 2.5; .025 MG/1; MG/1
1 TABLET ORAL 4 TIMES DAILY PRN
Qty: 10 TABLET | Refills: 0 | Status: SHIPPED | OUTPATIENT
Start: 2018-04-20 | End: 2018-04-23 | Stop reason: SDUPTHER

## 2018-04-23 RX ORDER — DIPHENOXYLATE HYDROCHLORIDE AND ATROPINE SULFATE 2.5; .025 MG/1; MG/1
1 TABLET ORAL 4 TIMES DAILY PRN
Qty: 10 TABLET | Refills: 0 | Status: SHIPPED | OUTPATIENT
Start: 2018-04-23 | End: 2018-05-16 | Stop reason: SDUPTHER

## 2018-04-23 RX ORDER — DIPHENOXYLATE HYDROCHLORIDE AND ATROPINE SULFATE 2.5; .025 MG/1; MG/1
1 TABLET ORAL 4 TIMES DAILY PRN
Qty: 10 TABLET | Refills: 0 | OUTPATIENT
Start: 2018-04-23 | End: 2018-05-03

## 2018-04-24 ENCOUNTER — CLINICAL SUPPORT (OUTPATIENT)
Dept: FAMILY MEDICINE | Facility: CLINIC | Age: 28
End: 2018-04-24
Payer: MEDICAID

## 2018-04-24 DIAGNOSIS — Z30.42 ENCOUNTER FOR DEPO-PROVERA CONTRACEPTION: Primary | ICD-10-CM

## 2018-04-24 PROCEDURE — 99499 UNLISTED E&M SERVICE: CPT | Mod: S$PBB,,, | Performed by: FAMILY MEDICINE

## 2018-04-24 PROCEDURE — 99212 OFFICE O/P EST SF 10 MIN: CPT | Mod: PBBFAC,PO

## 2018-04-24 PROCEDURE — 96372 THER/PROPH/DIAG INJ SC/IM: CPT | Mod: PBBFAC,PO

## 2018-04-24 PROCEDURE — 99999 PR PBB SHADOW E&M-EST. PATIENT-LVL II: CPT | Mod: PBBFAC,,,

## 2018-04-24 RX ADMIN — MEDROXYPROGESTERONE ACETATE 150 MG: 150 INJECTION, SUSPENSION INTRAMUSCULAR at 10:04

## 2018-04-24 NOTE — PROGRESS NOTES
Administered Depo Provera inj to right ventrogluteal. Pt tolerated well Advised to wait in lobby 15 minutes prior to leaving. Lot [id number] Exp 5/31/2021

## 2018-04-27 ENCOUNTER — PATIENT MESSAGE (OUTPATIENT)
Dept: OBSTETRICS AND GYNECOLOGY | Facility: CLINIC | Age: 28
End: 2018-04-27

## 2018-04-27 ENCOUNTER — TELEPHONE (OUTPATIENT)
Dept: FAMILY MEDICINE | Facility: CLINIC | Age: 28
End: 2018-04-27

## 2018-04-27 DIAGNOSIS — K52.9 CHRONIC DIARRHEA: Primary | ICD-10-CM

## 2018-04-27 DIAGNOSIS — R19.7 DIARRHEA, UNSPECIFIED TYPE: ICD-10-CM

## 2018-04-27 RX ORDER — DIPHENOXYLATE HYDROCHLORIDE AND ATROPINE SULFATE 2.5; .025 MG/1; MG/1
1 TABLET ORAL 4 TIMES DAILY PRN
Qty: 10 TABLET | Refills: 0 | OUTPATIENT
Start: 2018-04-27 | End: 2018-05-07

## 2018-04-27 NOTE — TELEPHONE ENCOUNTER
I dont recommend ongoing use of lomotil-if diarrea continues rec GI consult and can use otc immodium in the meantime

## 2018-04-28 ENCOUNTER — PATIENT MESSAGE (OUTPATIENT)
Dept: FAMILY MEDICINE | Facility: CLINIC | Age: 28
End: 2018-04-28

## 2018-04-29 ENCOUNTER — PATIENT MESSAGE (OUTPATIENT)
Dept: FAMILY MEDICINE | Facility: CLINIC | Age: 28
End: 2018-04-29

## 2018-05-01 ENCOUNTER — PATIENT OUTREACH (OUTPATIENT)
Dept: ADMINISTRATIVE | Facility: HOSPITAL | Age: 28
End: 2018-05-01

## 2018-05-02 ENCOUNTER — PATIENT MESSAGE (OUTPATIENT)
Dept: FAMILY MEDICINE | Facility: CLINIC | Age: 28
End: 2018-05-02

## 2018-05-02 ENCOUNTER — TELEPHONE (OUTPATIENT)
Dept: FAMILY MEDICINE | Facility: CLINIC | Age: 28
End: 2018-05-02

## 2018-05-02 ENCOUNTER — PATIENT MESSAGE (OUTPATIENT)
Dept: OBSTETRICS AND GYNECOLOGY | Facility: CLINIC | Age: 28
End: 2018-05-02

## 2018-05-02 DIAGNOSIS — K52.9 CHRONIC DIARRHEA: Primary | ICD-10-CM

## 2018-05-04 ENCOUNTER — OFFICE VISIT (OUTPATIENT)
Dept: OBSTETRICS AND GYNECOLOGY | Facility: CLINIC | Age: 28
End: 2018-05-04
Payer: MEDICAID

## 2018-05-04 VITALS — WEIGHT: 149.69 LBS | BODY MASS INDEX: 25.56 KG/M2 | HEIGHT: 64 IN

## 2018-05-04 DIAGNOSIS — N92.0 MENORRHAGIA WITH REGULAR CYCLE: Primary | ICD-10-CM

## 2018-05-04 PROCEDURE — 99213 OFFICE O/P EST LOW 20 MIN: CPT | Mod: S$PBB,,, | Performed by: SPECIALIST

## 2018-05-04 PROCEDURE — 99213 OFFICE O/P EST LOW 20 MIN: CPT | Mod: PBBFAC,PN | Performed by: SPECIALIST

## 2018-05-04 PROCEDURE — 99999 PR PBB SHADOW E&M-EST. PATIENT-LVL III: CPT | Mod: PBBFAC,,, | Performed by: SPECIALIST

## 2018-05-04 RX ORDER — PROGESTERONE 100 MG/1
100 CAPSULE ORAL NIGHTLY
Qty: 10 CAPSULE | Refills: 0 | Status: SHIPPED | OUTPATIENT
Start: 2018-05-04 | End: 2018-06-28

## 2018-05-04 NOTE — PROGRESS NOTES
26 yo WF presents for for evaluation of DUB. Pt has received DPMA Feb and second dosing April and complains of irregular and frequent menstruation since that time. Pt denies overt pain, vaginal d/c, dysuria.    Past Medical History:   Diagnosis Date    Abnormal Pap smear     ALLERGIC RHINITIS     Anemia     Chronic back pain     Drug-seeking behavior 4/16/2012    Epilepsy     GERD (gastroesophageal reflux disease)     Opioid dependence 4/16/2012    Pre-eclampsia     Seizures     last-10/2011, EEG-normal    Urinary tract infection        Past Surgical History:   Procedure Laterality Date    CHOLECYSTECTOMY      mirena removal  02/2018    PELVIC LAPAROSCOPY      iud retrieval     VAGINAL DELIVERY      times 4    vestibulectomy      WISDOM TOOTH EXTRACTION         Family History   Problem Relation Age of Onset    Breast cancer Neg Hx     Ovarian cancer Neg Hx        Social History     Social History    Marital status: Single     Spouse name: N/A    Number of children: N/A    Years of education: N/A     Social History Main Topics    Smoking status: Current Every Day Smoker     Packs/day: 0.25     Years: 5.00     Types: Cigarettes    Smokeless tobacco: Never Used    Alcohol use Yes      Comment: occasional    Drug use: Yes     Types: Hydrocodone    Sexual activity: Not Currently     Birth control/ protection: Injection     Other Topics Concern    Not on file     Social History Narrative    No narrative on file       Current Outpatient Prescriptions   Medication Sig Dispense Refill    butalbital-acetaminophen-caffeine -40 mg (FIORICET, ESGIC) -40 mg per tablet Take 1 tablet by mouth 2 (two) times daily as needed. 60 tablet 0    citalopram (CELEXA) 40 MG tablet Take 1 tablet (40 mg total) by mouth once daily. 90 tablet 3    diazePAM (VALIUM) 5 MG tablet Take 5 mg by mouth once daily.       diphenoxylate-atropine 2.5-0.025 mg (LOMOTIL) 2.5-0.025 mg per tablet Take 1 tablet by  mouth 4 (four) times daily as needed for Diarrhea. 10 tablet 0    fluticasone (FLONASE) 50 mcg/actuation nasal spray 2 sprays (100 mcg total) by Each Nare route once daily. 1 Bottle 0    gabapentin (NEURONTIN) 800 MG tablet Take 800 mg by mouth 3 (three) times daily.       ibuprofen (ADVIL,MOTRIN) 800 MG tablet TAKE ONE TABLET BY MOUTH THREE TIMES DAILY 90 tablet 12    levetiracetam (KEPPRA) 500 MG Tab Take 2 tablets (1,000 mg total) by mouth 2 (two) times daily. 360 tablet 3    ranitidine (ZANTAC) 150 MG tablet Take 1 tablet (150 mg total) by mouth 2 (two) times daily. (Patient taking differently: Take 150 mg by mouth 2 (two) times daily as needed. ) 60 tablet 11     Current Facility-Administered Medications   Medication Dose Route Frequency Provider Last Rate Last Dose    medroxyPROGESTERone (DEPO-PROVERA) injection 150 mg  150 mg Intramuscular Q90 Days Kristian Landaverde MD   150 mg at 04/24/18 1007       Review of patient's allergies indicates:   Allergen Reactions    Darvocet a500 [propoxyphene n-acetaminophen]     Phenytoin sodium extended Other (See Comments)       Review of System:   General: no chills, fever, night sweats, weight gain or weight loss  Psychological: no depression or suicidal ideation  Breasts: no new or changing breast lumps, nipple discharge or masses.  Respiratory: no cough, shortness of breath, or wheezing  Cardiovascular: no chest pain or dyspnea on exertion  Gastrointestinal: no abdominal pain, change in bowel habits, or black or bloody stools  Genito-Urinary: no incontinence, urinary frequency/urgency or vulvar/vaginal symptoms, pelvic pan  POSITIVE IRREGULAR MENSTRUATION.  Musculoskeletal: no gait disturbance or muscular weakness    General Appearance:  Alert, cooperative, no distress, appears stated age   Head:  Normocephalic, without obvious abnormality, atraumatic   Eyes:  PERRL, conjunctiva/corneas clear, EOM's intact, fundi benign, both eyes   Ears:  Normal TM's and  external ear canals, both ears   Nose: Nares normal, septum midline,mucosa normal, no drainage or sinus tenderness   Throat: Lips, mucosa, and tongue normal; teeth and gums normal   Neck: Supple, symmetrical, trachea midline, no adenopathy;  thyroid: not enlarged, symmetric, no tenderness/mass/nodules; no carotid bruit or JVD   Back:   Symmetric, no curvature, ROM normal, no CVA tenderness   Lungs:   Clear to auscultation bilaterally, respirations unlabored       Heart:  Regular rate and rhythm, S1 and S2 normal, no murmur, rub, or gallop   Abdomen:   Soft, non-tender, bowel sounds active all four quadrants,  no masses, no organomegaly    Genitourinary:   External rectal exam shows no thrombosed external hemorrhoids.   Pelvic exam was performed with patient supine.  No labial fusion.  There is no rash, lesion or injury on the right labia.  There is no rash, lesion or injury on the left labia.  No bleeding and no signs of injury around the vaginal introitus, urethra is without lesions and well supported. The cervix is visualized with no discharge, lesions or friability.  No vaginal discharge found.  No significant Cystocele, Enterocele or rectocele, and uterus well supported.  Bimanual exam:  The urethra is normal to palpation and there are no palpable vaginal wall masses.  Uterus is not deviated, not enlarged, not fixed, normal shape and not tender.  Cervix exhibits no motion tenderness.   Right adnexum displays no mass and no tenderness.  Left adnexum displays no mass and no tenderness.   Extremities: Extremities normal, atraumatic, no cyanosis or edema   Pulses: 2+ and symmetric   Skin: Skin color, texture, turgor normal, no rashes or lesions   Lymph nodes: Cervical, supraclavicular, and axillary nodes normal   Neurologic: Normal       I DISCUSSED LIKELY PROGESTIN ETIOLOGY OF IRREGULAR BLEEDING. I DISCUSSED AND REC ENDOMETRIAL ABLATION WITH BTL.  PT IS TO CONSIDER. IN THE INTERIM, I WILL PRESCRIBE PROGETIN TO HELP  STABILIZE THE ENDOMETRIUM AND OBTAIN PELVIC UU/S  WILL FOLLOW

## 2018-05-10 ENCOUNTER — TELEPHONE (OUTPATIENT)
Dept: RADIOLOGY | Facility: HOSPITAL | Age: 28
End: 2018-05-10

## 2018-05-11 ENCOUNTER — HOSPITAL ENCOUNTER (OUTPATIENT)
Dept: RADIOLOGY | Facility: HOSPITAL | Age: 28
Discharge: HOME | End: 2018-05-11
Attending: SPECIALIST
Payer: MEDICAID

## 2018-05-11 DIAGNOSIS — N92.0 MENORRHAGIA WITH REGULAR CYCLE: ICD-10-CM

## 2018-05-11 PROCEDURE — 76856 US EXAM PELVIC COMPLETE: CPT | Mod: 26,,, | Performed by: RADIOLOGY

## 2018-05-11 PROCEDURE — 76830 TRANSVAGINAL US NON-OB: CPT | Mod: TC,PO

## 2018-05-11 PROCEDURE — 76830 TRANSVAGINAL US NON-OB: CPT | Mod: 26,,, | Performed by: RADIOLOGY

## 2018-05-13 ENCOUNTER — PATIENT MESSAGE (OUTPATIENT)
Dept: OBSTETRICS AND GYNECOLOGY | Facility: CLINIC | Age: 28
End: 2018-05-13

## 2018-05-13 ENCOUNTER — PATIENT MESSAGE (OUTPATIENT)
Dept: FAMILY MEDICINE | Facility: CLINIC | Age: 28
End: 2018-05-13

## 2018-05-14 ENCOUNTER — OFFICE VISIT (OUTPATIENT)
Dept: FAMILY MEDICINE | Facility: CLINIC | Age: 28
End: 2018-05-14
Payer: MEDICAID

## 2018-05-14 VITALS
DIASTOLIC BLOOD PRESSURE: 61 MMHG | TEMPERATURE: 98 F | HEIGHT: 64 IN | SYSTOLIC BLOOD PRESSURE: 96 MMHG | HEART RATE: 81 BPM | WEIGHT: 146.38 LBS | BODY MASS INDEX: 24.99 KG/M2

## 2018-05-14 DIAGNOSIS — S99.921A INJURY OF RIGHT FOOT, INITIAL ENCOUNTER: ICD-10-CM

## 2018-05-14 DIAGNOSIS — S99.911A INJURY OF RIGHT ANKLE, INITIAL ENCOUNTER: Primary | ICD-10-CM

## 2018-05-14 PROCEDURE — 99213 OFFICE O/P EST LOW 20 MIN: CPT | Mod: PBBFAC,PO | Performed by: NURSE PRACTITIONER

## 2018-05-14 PROCEDURE — 99213 OFFICE O/P EST LOW 20 MIN: CPT | Mod: S$PBB,,, | Performed by: NURSE PRACTITIONER

## 2018-05-14 PROCEDURE — 99999 PR PBB SHADOW E&M-EST. PATIENT-LVL III: CPT | Mod: PBBFAC,,, | Performed by: NURSE PRACTITIONER

## 2018-05-14 RX ORDER — ACETAMINOPHEN AND CODEINE PHOSPHATE 300; 30 MG/1; MG/1
1 TABLET ORAL 2 TIMES DAILY PRN
Qty: 10 TABLET | Refills: 0 | Status: SHIPPED | OUTPATIENT
Start: 2018-05-14 | End: 2018-05-24

## 2018-05-14 RX ORDER — CHOLESTYRAMINE LIGHT 4 G/5.7G
POWDER, FOR SUSPENSION ORAL
COMMUNITY
Start: 2018-05-10 | End: 2018-11-13

## 2018-05-14 RX ORDER — PREDNISONE 20 MG/1
20 TABLET ORAL 2 TIMES DAILY
Qty: 10 TABLET | Refills: 0 | Status: SHIPPED | OUTPATIENT
Start: 2018-05-14 | End: 2018-05-19

## 2018-05-14 NOTE — PROGRESS NOTES
Subjective:       Patient ID: Sara Sanford is a 27 y.o. female.    Chief Complaint: Foot Pain (right)    Foot Injury    Incident onset: 5/1/18. The incident occurred at home. The injury mechanism was a fall. The pain is present in the right foot and right ankle. The quality of the pain is described as aching. The pain is at a severity of 10/10. The pain has been constant since onset. The symptoms are aggravated by weight bearing and palpation. She has tried ice, heat, elevation, immobilization and NSAIDs for the symptoms. The treatment provided no relief.       Review of Systems   Constitutional: Negative for fatigue, fever and unexpected weight change.   HENT: Negative for ear pain and sore throat.    Eyes: Negative for pain and visual disturbance.   Respiratory: Negative for cough and shortness of breath.    Cardiovascular: Negative for chest pain and palpitations.   Gastrointestinal: Negative for abdominal pain, diarrhea and vomiting.   Musculoskeletal: Positive for arthralgias, gait problem and joint swelling. Negative for myalgias.   Skin: Negative for color change and rash.   Neurological: Negative for dizziness and headaches.   Psychiatric/Behavioral: Negative for dysphoric mood and sleep disturbance. The patient is not nervous/anxious.        Vitals:    05/14/18 1115   BP: 96/61   Pulse: 81   Temp: 97.9 °F (36.6 °C)       Objective:     Current Outpatient Prescriptions   Medication Sig Dispense Refill    butalbital-acetaminophen-caffeine -40 mg (FIORICET, ESGIC) -40 mg per tablet Take 1 tablet by mouth 2 (two) times daily as needed. 60 tablet 0    citalopram (CELEXA) 40 MG tablet Take 1 tablet (40 mg total) by mouth once daily. 90 tablet 3    diazePAM (VALIUM) 5 MG tablet Take 5 mg by mouth once daily.       diphenoxylate-atropine 2.5-0.025 mg (LOMOTIL) 2.5-0.025 mg per tablet Take 1 tablet by mouth 4 (four) times daily as needed for Diarrhea. 10 tablet 0    gabapentin (NEURONTIN) 800 MG  tablet Take 800 mg by mouth 4 (four) times daily.       ibuprofen (ADVIL,MOTRIN) 800 MG tablet TAKE ONE TABLET BY MOUTH THREE TIMES DAILY (Patient taking differently: TAKE ONE TABLET BY MOUTH THREE TIMES DAILY as needed) 90 tablet 12    levetiracetam (KEPPRA) 500 MG Tab Take 2 tablets (1,000 mg total) by mouth 2 (two) times daily. 360 tablet 3    progesterone (PROMETRIUM) 100 MG capsule Take 1 capsule (100 mg total) by mouth nightly. 10 capsule 0    ranitidine (ZANTAC) 150 MG tablet Take 1 tablet (150 mg total) by mouth 2 (two) times daily. (Patient taking differently: Take 150 mg by mouth 2 (two) times daily as needed. ) 60 tablet 11    acetaminophen-codeine 300-30mg (TYLENOL #3) 300-30 mg Tab Take 1 tablet by mouth 2 (two) times daily as needed. 10 tablet 0    CHOLESTYRAMINE LIGHT 4 gram packet       predniSONE (DELTASONE) 20 MG tablet Take 1 tablet (20 mg total) by mouth 2 (two) times daily. 10 tablet 0     Current Facility-Administered Medications   Medication Dose Route Frequency Provider Last Rate Last Dose    medroxyPROGESTERone (DEPO-PROVERA) injection 150 mg  150 mg Intramuscular Q90 Days Kristian Landaverde MD   150 mg at 04/24/18 1007       Physical Exam   Constitutional: She is oriented to person, place, and time. She appears well-developed and well-nourished. No distress.   HENT:   Head: Normocephalic and atraumatic.   Eyes: EOM are normal. Pupils are equal, round, and reactive to light.   Neck: Normal range of motion. Neck supple.   Cardiovascular: Normal rate and regular rhythm.    Pulmonary/Chest: Effort normal and breath sounds normal.   Musculoskeletal: Normal range of motion.        Right ankle: She exhibits no swelling and no ecchymosis. Tenderness. Lateral malleolus tenderness found.        Right foot: There is tenderness and bony tenderness. There is no swelling and no deformity.   Neurological: She is alert and oriented to person, place, and time.   Skin: Skin is warm and dry. No rash  noted.   Psychiatric: She has a normal mood and affect. Judgment normal.   Nursing note and vitals reviewed.      Assessment:       1. Injury of right ankle, initial encounter    2. Injury of right foot, initial encounter        Plan:   Injury of right ankle, initial encounter  -     Ambulatory referral to Orthopedics    Injury of right foot, initial encounter  -     Ambulatory referral to Orthopedics    Other orders  -     predniSONE (DELTASONE) 20 MG tablet; Take 1 tablet (20 mg total) by mouth 2 (two) times daily.  Dispense: 10 tablet; Refill: 0  -     acetaminophen-codeine 300-30mg (TYLENOL #3) 300-30 mg Tab; Take 1 tablet by mouth 2 (two) times daily as needed.  Dispense: 10 tablet; Refill: 0        No Follow-up on file.

## 2018-05-15 ENCOUNTER — PATIENT MESSAGE (OUTPATIENT)
Dept: FAMILY MEDICINE | Facility: CLINIC | Age: 28
End: 2018-05-15

## 2018-05-16 ENCOUNTER — PATIENT MESSAGE (OUTPATIENT)
Dept: FAMILY MEDICINE | Facility: CLINIC | Age: 28
End: 2018-05-16

## 2018-05-16 DIAGNOSIS — R19.7 DIARRHEA, UNSPECIFIED TYPE: Primary | ICD-10-CM

## 2018-05-16 RX ORDER — ACETAMINOPHEN AND CODEINE PHOSPHATE 300; 30 MG/1; MG/1
1 TABLET ORAL 2 TIMES DAILY PRN
Qty: 10 TABLET | Refills: 0 | OUTPATIENT
Start: 2018-05-16 | End: 2018-05-26

## 2018-05-16 RX ORDER — DIPHENOXYLATE HYDROCHLORIDE AND ATROPINE SULFATE 2.5; .025 MG/1; MG/1
1 TABLET ORAL 4 TIMES DAILY PRN
Qty: 10 TABLET | Refills: 0 | Status: SHIPPED | OUTPATIENT
Start: 2018-05-16 | End: 2018-05-26

## 2018-05-16 RX ORDER — ACETAMINOPHEN AND CODEINE PHOSPHATE 300; 30 MG/1; MG/1
TABLET ORAL
Qty: 10 TABLET | OUTPATIENT
Start: 2018-05-16

## 2018-05-18 NOTE — TELEPHONE ENCOUNTER
Pt is requesting another refill Rx for Tylenol #3. Prior refill request was denied by Poonam Forrester NP. Pt states that she has received a new injury and needs to have the Rx refilled. Routing conversation to PCP staff as requested by Ms Forrester.

## 2018-05-21 RX ORDER — KETOROLAC TROMETHAMINE 10 MG/1
10 TABLET, FILM COATED ORAL EVERY 6 HOURS
Qty: 20 TABLET | Refills: 0 | Status: SHIPPED | OUTPATIENT
Start: 2018-05-21 | End: 2018-11-13

## 2018-05-21 RX ORDER — ACETAMINOPHEN AND CODEINE PHOSPHATE 300; 30 MG/1; MG/1
1 TABLET ORAL 2 TIMES DAILY PRN
Qty: 10 TABLET | Refills: 0 | OUTPATIENT
Start: 2018-05-21 | End: 2018-05-31

## 2018-05-24 ENCOUNTER — PATIENT MESSAGE (OUTPATIENT)
Dept: FAMILY MEDICINE | Facility: CLINIC | Age: 28
End: 2018-05-24

## 2018-05-24 DIAGNOSIS — R10.11 RIGHT UPPER QUADRANT PAIN: ICD-10-CM

## 2018-06-25 ENCOUNTER — TELEPHONE (OUTPATIENT)
Dept: FAMILY MEDICINE | Facility: CLINIC | Age: 28
End: 2018-06-25

## 2018-06-25 NOTE — TELEPHONE ENCOUNTER
----- Message from Gwendolyn Villalobos sent at 6/25/2018  8:37 AM CDT -----  Contact: Patients mother , Binta Judd would like for her daughter to be worked unto the schedule on Thursday for a hospital follow up, but there are no openings. Please call her back at [phone].  Thank you

## 2018-06-26 ENCOUNTER — PATIENT MESSAGE (OUTPATIENT)
Dept: FAMILY MEDICINE | Facility: CLINIC | Age: 28
End: 2018-06-26

## 2018-06-27 ENCOUNTER — PATIENT MESSAGE (OUTPATIENT)
Dept: FAMILY MEDICINE | Facility: CLINIC | Age: 28
End: 2018-06-27

## 2018-06-27 ENCOUNTER — PATIENT OUTREACH (OUTPATIENT)
Dept: ADMINISTRATIVE | Facility: HOSPITAL | Age: 28
End: 2018-06-27

## 2018-06-27 RX ORDER — KETOROLAC TROMETHAMINE 10 MG/1
10 TABLET, FILM COATED ORAL EVERY 6 HOURS
Qty: 20 TABLET | Refills: 0 | Status: SHIPPED | OUTPATIENT
Start: 2018-06-27 | End: 2018-06-28

## 2018-06-28 ENCOUNTER — OFFICE VISIT (OUTPATIENT)
Dept: FAMILY MEDICINE | Facility: CLINIC | Age: 28
End: 2018-06-28
Payer: MEDICAID

## 2018-06-28 ENCOUNTER — PATIENT MESSAGE (OUTPATIENT)
Dept: FAMILY MEDICINE | Facility: CLINIC | Age: 28
End: 2018-06-28

## 2018-06-28 VITALS
HEIGHT: 64 IN | TEMPERATURE: 98 F | HEART RATE: 81 BPM | WEIGHT: 149.81 LBS | DIASTOLIC BLOOD PRESSURE: 78 MMHG | SYSTOLIC BLOOD PRESSURE: 121 MMHG | BODY MASS INDEX: 25.57 KG/M2

## 2018-06-28 DIAGNOSIS — G40.909 SEIZURE DISORDER: ICD-10-CM

## 2018-06-28 DIAGNOSIS — K12.1: Primary | ICD-10-CM

## 2018-06-28 DIAGNOSIS — K12.0 APHTHOUS ULCER: ICD-10-CM

## 2018-06-28 DIAGNOSIS — F17.200 TOBACCO DEPENDENCE: ICD-10-CM

## 2018-06-28 PROCEDURE — 99213 OFFICE O/P EST LOW 20 MIN: CPT | Mod: PBBFAC,PO | Performed by: FAMILY MEDICINE

## 2018-06-28 PROCEDURE — 99999 PR PBB SHADOW E&M-EST. PATIENT-LVL III: CPT | Mod: PBBFAC,,, | Performed by: FAMILY MEDICINE

## 2018-06-28 PROCEDURE — 99214 OFFICE O/P EST MOD 30 MIN: CPT | Mod: S$PBB,,, | Performed by: FAMILY MEDICINE

## 2018-06-28 RX ORDER — IBUPROFEN 200 MG
1 TABLET ORAL DAILY
Qty: 28 PATCH | Refills: 0 | Status: SHIPPED | OUTPATIENT
Start: 2018-06-28 | End: 2018-11-13

## 2018-06-28 RX ORDER — LEVETIRACETAM 750 MG/1
1500 TABLET ORAL 2 TIMES DAILY
Refills: 1 | COMMUNITY
Start: 2018-06-20 | End: 2019-09-20 | Stop reason: SDUPTHER

## 2018-06-28 RX ORDER — ACETAMINOPHEN AND CODEINE PHOSPHATE 300; 30 MG/1; MG/1
1 TABLET ORAL 4 TIMES DAILY
Qty: 28 TABLET | Refills: 0 | Status: SHIPPED | OUTPATIENT
Start: 2018-06-28 | End: 2018-07-08

## 2018-06-28 RX ORDER — IBUPROFEN 200 MG
1 TABLET ORAL DAILY
Qty: 28 PATCH | Refills: 0 | COMMUNITY
Start: 2018-06-28 | End: 2018-06-28 | Stop reason: SDUPTHER

## 2018-06-28 NOTE — PROGRESS NOTES
The patient presents to fu recent sz wo usual aura w oral trauma. Pain not resp to topical Keppra was incr and Neuro fu pending      Past Medical History:  Past Medical History:   Diagnosis Date    Abnormal Pap smear     ALLERGIC RHINITIS     Anemia     Chronic back pain     Drug-seeking behavior 4/16/2012    Epilepsy     GERD (gastroesophageal reflux disease)     Opioid dependence 4/16/2012    Pre-eclampsia     Seizures     last-10/2011, EEG-normal    Urinary tract infection      Past Surgical History:   Procedure Laterality Date    CHOLECYSTECTOMY      mirena removal  02/2018    PELVIC LAPAROSCOPY      iud retrieval     VAGINAL DELIVERY      times 4    vestibulectomy      WISDOM TOOTH EXTRACTION       Review of patient's allergies indicates:   Allergen Reactions    Darvocet a500 [propoxyphene n-acetaminophen]     Phenytoin sodium extended Other (See Comments)     Current Outpatient Prescriptions on File Prior to Visit   Medication Sig Dispense Refill    butalbital-acetaminophen-caffeine -40 mg (FIORICET, ESGIC) -40 mg per tablet Take 1 tablet by mouth 2 (two) times daily as needed. 60 tablet 0    CHOLESTYRAMINE LIGHT 4 gram packet       citalopram (CELEXA) 40 MG tablet Take 1 tablet (40 mg total) by mouth once daily. 90 tablet 3    diazePAM (VALIUM) 5 MG tablet Take 5 mg by mouth once daily.       gabapentin (NEURONTIN) 800 MG tablet Take 800 mg by mouth 4 (four) times daily.       ibuprofen (ADVIL,MOTRIN) 800 MG tablet TAKE ONE TABLET BY MOUTH THREE TIMES DAILY (Patient taking differently: TAKE ONE TABLET BY MOUTH THREE TIMES DAILY as needed) 90 tablet 12    ketorolac (TORADOL) 10 mg tablet Take 1 tablet (10 mg total) by mouth every 6 (six) hours. 20 tablet 0    ranitidine (ZANTAC) 150 MG tablet Take 1 tablet (150 mg total) by mouth 2 (two) times daily as needed. 60 tablet 11    [DISCONTINUED] ketorolac (TORADOL) 10 mg tablet Take 1 tablet (10 mg total) by mouth every 6  (six) hours. 20 tablet 0    [DISCONTINUED] levetiracetam (KEPPRA) 500 MG Tab Take 2 tablets (1,000 mg total) by mouth 2 (two) times daily. 360 tablet 3    [DISCONTINUED] progesterone (PROMETRIUM) 100 MG capsule Take 1 capsule (100 mg total) by mouth nightly. 10 capsule 0     Current Facility-Administered Medications on File Prior to Visit   Medication Dose Route Frequency Provider Last Rate Last Dose    medroxyPROGESTERone (DEPO-PROVERA) injection 150 mg  150 mg Intramuscular Q90 Days Kristian Landaverde MD   150 mg at 04/24/18 1007     Social History     Social History    Marital status: Single     Spouse name: N/A    Number of children: N/A    Years of education: N/A     Occupational History    Not on file.     Social History Main Topics    Smoking status: Current Every Day Smoker     Packs/day: 0.25     Years: 5.00     Types: Cigarettes    Smokeless tobacco: Never Used    Alcohol use Yes      Comment: occasional    Drug use: Yes     Types: Hydrocodone    Sexual activity: Not Currently     Birth control/ protection: Injection     Other Topics Concern    Not on file     Social History Narrative    No narrative on file     Family History   Problem Relation Age of Onset    Breast cancer Neg Hx     Ovarian cancer Neg Hx          ROS:GENERAL: No fever, chills, fatigability or weight loss.  SKIN: No rashes, itching or changes in color or texture of skin.  HEAD: No headaches or recent head trauma.EYES: Visual acuity fine. No photophobia, ocular pain or diplopia.EARS: Denies ear pain, discharge or vertigo.NOSE: No loss of smell, no epistaxis or postnasal drip.MOUTH & THROAT: Nochange in voice. No excessive gum bleeding.NODES: Denies swollen glands.  CHEST: Denies MDEINA, cyanosis, wheezing, cough and sputum production.  CARDIOVASCULAR: Denies chest pain, PND, orthopnea or reduced exercise tolerance.  ABDOMEN: Appetite fine. No weight loss. Denies diarrhea, abdominal pain, hematemesis or blood in  stool.  URINARY: No flank pain, dysuria or hematuria.  PERIPHERAL VASCULAR: No claudication or cyanosis.  MUSCULOSKELETAL: See above.  NEUROLOGIC: No history of seizures, paralysis, alteration of gait or coordination.  PE:   HEAD: Normocephalic, atraumatic.EYES: PERRL. EOMI.   EARS: TM's intact. Light reflex normal. No retraction or perforation.   NOSE: Mucosa pink. Airway clear.MOUTH & THROAT: Multiple ulcers upper gum line No tonsillar enlargement. No pharyngeal erythema or exudate. No stridor.  NODES: No cervical, axillary or inguinal lymph node enlargement.  CHEST: Lungs clear to auscultation.  CARDIOVASCULAR: Normal S1, S2. No rubs, murmurs or gallops.  ABDOMEN: Bowel sounds normal. Not distended. Soft. No tenderness or masses.  MUSCULOSKELETAL: No palpable abnormality  NEUROLOGIC: Cranial Nerves: II-XII grossly intact.  Motor: 5/5 strength major flexors/extensors.  DTR's: Knees, Ankles 2+ and equal bilaterally; downgoing toes.  Sensory: Intact to light touch distally.  Gait & Posture: Normal gait and fine motion. No cerebellar signs.     Impression:Apthous ulcers p oral trauma from sz   Plan:Lab eval rev  Magic mouthwash  Limited rx Tyl #3Rec diet and ex recs  Neuro fu   Rx nicotine patch

## 2018-06-29 ENCOUNTER — TELEPHONE (OUTPATIENT)
Dept: FAMILY MEDICINE | Facility: CLINIC | Age: 28
End: 2018-06-29

## 2018-06-29 DIAGNOSIS — R73.09 ELEVATED GLUCOSE: Primary | ICD-10-CM

## 2018-07-05 ENCOUNTER — PATIENT MESSAGE (OUTPATIENT)
Dept: FAMILY MEDICINE | Facility: CLINIC | Age: 28
End: 2018-07-05

## 2018-07-10 ENCOUNTER — LAB VISIT (OUTPATIENT)
Dept: LAB | Facility: HOSPITAL | Age: 28
End: 2018-07-10
Attending: FAMILY MEDICINE
Payer: MEDICAID

## 2018-07-10 DIAGNOSIS — R73.09 ELEVATED GLUCOSE: ICD-10-CM

## 2018-07-10 LAB
ANION GAP SERPL CALC-SCNC: 11 MMOL/L
BUN SERPL-MCNC: 8 MG/DL
CALCIUM SERPL-MCNC: 9.8 MG/DL
CHLORIDE SERPL-SCNC: 104 MMOL/L
CO2 SERPL-SCNC: 25 MMOL/L
CREAT SERPL-MCNC: 0.7 MG/DL
EST. GFR  (AFRICAN AMERICAN): >60 ML/MIN/1.73 M^2
EST. GFR  (NON AFRICAN AMERICAN): >60 ML/MIN/1.73 M^2
ESTIMATED AVG GLUCOSE: 82 MG/DL
GLUCOSE SERPL-MCNC: 120 MG/DL
HBA1C MFR BLD HPLC: 4.5 %
POTASSIUM SERPL-SCNC: 4.1 MMOL/L
SODIUM SERPL-SCNC: 140 MMOL/L

## 2018-07-10 PROCEDURE — 36415 COLL VENOUS BLD VENIPUNCTURE: CPT | Mod: PO

## 2018-07-10 PROCEDURE — 83036 HEMOGLOBIN GLYCOSYLATED A1C: CPT

## 2018-07-10 PROCEDURE — 80048 BASIC METABOLIC PNL TOTAL CA: CPT

## 2018-07-15 ENCOUNTER — PATIENT MESSAGE (OUTPATIENT)
Dept: FAMILY MEDICINE | Facility: CLINIC | Age: 28
End: 2018-07-15

## 2018-07-16 RX ORDER — DIPHENOXYLATE HYDROCHLORIDE AND ATROPINE SULFATE 2.5; .025 MG/1; MG/1
1 TABLET ORAL 4 TIMES DAILY PRN
Qty: 10 TABLET | Refills: 0 | Status: SHIPPED | OUTPATIENT
Start: 2018-07-16 | End: 2018-07-26

## 2018-07-24 ENCOUNTER — CLINICAL SUPPORT (OUTPATIENT)
Dept: FAMILY MEDICINE | Facility: CLINIC | Age: 28
End: 2018-07-24
Payer: MEDICAID

## 2018-07-24 DIAGNOSIS — Z30.42 ENCOUNTER FOR DEPO-PROVERA CONTRACEPTION: Primary | ICD-10-CM

## 2018-07-24 PROCEDURE — 99999 PR PBB SHADOW E&M-EST. PATIENT-LVL II: CPT | Mod: PBBFAC,,,

## 2018-07-24 PROCEDURE — 99212 OFFICE O/P EST SF 10 MIN: CPT | Mod: PBBFAC,PO

## 2018-07-24 PROCEDURE — 99499 UNLISTED E&M SERVICE: CPT | Mod: S$PBB,,, | Performed by: FAMILY MEDICINE

## 2018-07-24 PROCEDURE — 96372 THER/PROPH/DIAG INJ SC/IM: CPT | Mod: PBBFAC,PO

## 2018-07-24 RX ADMIN — MEDROXYPROGESTERONE ACETATE 150 MG: 150 INJECTION, SUSPENSION INTRAMUSCULAR at 09:07

## 2018-07-24 NOTE — PROGRESS NOTES
Administered Depo Provera inj to right ventrogluteal. Pt tolerated well. Advised to wait in lobby 15 minutes prior to leaving. Lot [id number] Exp 5/31/2021

## 2018-07-25 RX ORDER — CITALOPRAM 40 MG/1
TABLET, FILM COATED ORAL
Qty: 90 TABLET | Refills: 4 | Status: SHIPPED | OUTPATIENT
Start: 2018-07-25 | End: 2020-02-27

## 2018-07-31 ENCOUNTER — PATIENT MESSAGE (OUTPATIENT)
Dept: FAMILY MEDICINE | Facility: CLINIC | Age: 28
End: 2018-07-31

## 2018-07-31 NOTE — TELEPHONE ENCOUNTER
No-I'd rec she stop taking this. If her diarrhea is that uncontrolled she should be monitered and trated by GI

## 2018-08-21 ENCOUNTER — TELEPHONE (OUTPATIENT)
Dept: FAMILY MEDICINE | Facility: CLINIC | Age: 28
End: 2018-08-21

## 2018-08-21 NOTE — TELEPHONE ENCOUNTER
Left vm and spoke with pt family member for a return call. Pt received injection last month and it is good for 3 months.

## 2018-08-21 NOTE — TELEPHONE ENCOUNTER
----- Message from Edvin Gamino sent at 8/21/2018  7:23 AM CDT -----  Contact: Cream Stylebridget  ----- Message from [facility], System Message sent at 8/19/2018  4:33 AM CDT -----    Appointment Request From: Sara Sanford    With Provider: Nurse    Preferred Date Range: 12/19/2018 - 12/19/2018    Preferred Times: Any time    Reason for visit: Birth control shot    Comments:  [phone]

## 2018-08-24 ENCOUNTER — TELEPHONE (OUTPATIENT)
Dept: FAMILY MEDICINE | Facility: CLINIC | Age: 28
End: 2018-08-24

## 2018-08-24 NOTE — TELEPHONE ENCOUNTER
----- Message from Mela Eagle sent at 8/24/2018  1:48 PM CDT -----  Please work in pt for [facility] f/u..[phone] (home) [phone] (work)

## 2018-10-08 ENCOUNTER — TELEPHONE (OUTPATIENT)
Dept: FAMILY MEDICINE | Facility: CLINIC | Age: 28
End: 2018-10-08

## 2018-10-08 NOTE — TELEPHONE ENCOUNTER
----- Message from Elias Montalvo sent at 10/8/2018 10:02 AM CDT -----  Contact: pt  She's calling in regards to scheduling a nurse visit for her injection, [phone] (cell)

## 2018-10-14 ENCOUNTER — PATIENT MESSAGE (OUTPATIENT)
Dept: FAMILY MEDICINE | Facility: CLINIC | Age: 28
End: 2018-10-14

## 2018-10-14 ENCOUNTER — PATIENT MESSAGE (OUTPATIENT)
Dept: OBSTETRICS AND GYNECOLOGY | Facility: CLINIC | Age: 28
End: 2018-10-14

## 2018-10-15 DIAGNOSIS — Z11.3 SCREEN FOR STD (SEXUALLY TRANSMITTED DISEASE): Primary | ICD-10-CM

## 2018-10-15 RX ORDER — ACETAMINOPHEN AND CODEINE PHOSPHATE 300; 30 MG/1; MG/1
1 TABLET ORAL 2 TIMES DAILY
Qty: 10 TABLET | Refills: 0 | Status: SHIPPED | OUTPATIENT
Start: 2018-10-15 | End: 2018-10-25

## 2018-10-16 ENCOUNTER — CLINICAL SUPPORT (OUTPATIENT)
Dept: FAMILY MEDICINE | Facility: CLINIC | Age: 28
End: 2018-10-16
Payer: MEDICAID

## 2018-10-16 ENCOUNTER — TELEPHONE (OUTPATIENT)
Dept: FAMILY MEDICINE | Facility: CLINIC | Age: 28
End: 2018-10-16

## 2018-10-16 ENCOUNTER — LAB VISIT (OUTPATIENT)
Dept: LAB | Facility: HOSPITAL | Age: 28
End: 2018-10-16
Attending: SPECIALIST
Payer: MEDICAID

## 2018-10-16 DIAGNOSIS — Z11.3 SCREEN FOR STD (SEXUALLY TRANSMITTED DISEASE): ICD-10-CM

## 2018-10-16 PROCEDURE — 86694 HERPES SIMPLEX NES ANTBDY: CPT

## 2018-10-16 PROCEDURE — 86592 SYPHILIS TEST NON-TREP QUAL: CPT

## 2018-10-16 PROCEDURE — 86703 HIV-1/HIV-2 1 RESULT ANTBDY: CPT

## 2018-10-16 PROCEDURE — 86696 HERPES SIMPLEX TYPE 2 TEST: CPT

## 2018-10-16 PROCEDURE — 80074 ACUTE HEPATITIS PANEL: CPT

## 2018-10-16 NOTE — PROGRESS NOTES
Pt arrived today and states she does not wish to take the depo injection at this time, but does not want her family to know. States she has been having some possible side effects and wants to wait and see if they disappear. She was instructed to notify her physician of this. She states she will return in 3 months if she decides to continue the injections. Informed will have to do pregnancy test at that time as well as have new order for injections. She verbalized understanding.

## 2018-10-16 NOTE — TELEPHONE ENCOUNTER
Pt arrived today for her Depo injection, but refused it once we were in the exam room. She did not want her grandmother to know that she did not take the injection. She states that she has been having some possible side effects, but did not say what they were. She is wanting to wait 3 months to see if they subside and if so she will continue the injections. She was instructed that she would have to do a pregnancy test at that time and have new orders to restart the injections. She was also instructed to contact her physician to discuss the potential side effects.

## 2018-10-17 LAB
HAV IGM SERPL QL IA: NEGATIVE
HBV CORE IGM SERPL QL IA: NEGATIVE
HBV SURFACE AG SERPL QL IA: NEGATIVE
HCV AB SERPL QL IA: NEGATIVE
HIV 1+2 AB+HIV1 P24 AG SERPL QL IA: NEGATIVE
HSV AB, IGM BY EIA: NEGATIVE
RPR SER QL: NORMAL

## 2018-10-19 LAB
HSV1 IGG SERPL QL IA: NEGATIVE
HSV2 IGG SERPL QL IA: NEGATIVE

## 2018-10-22 ENCOUNTER — PATIENT MESSAGE (OUTPATIENT)
Dept: FAMILY MEDICINE | Facility: CLINIC | Age: 28
End: 2018-10-22

## 2018-10-29 ENCOUNTER — PATIENT MESSAGE (OUTPATIENT)
Dept: OBSTETRICS AND GYNECOLOGY | Facility: CLINIC | Age: 28
End: 2018-10-29

## 2018-10-29 ENCOUNTER — PATIENT MESSAGE (OUTPATIENT)
Dept: FAMILY MEDICINE | Facility: CLINIC | Age: 28
End: 2018-10-29

## 2018-10-29 RX ORDER — ACETAMINOPHEN AND CODEINE PHOSPHATE 300; 30 MG/1; MG/1
1 TABLET ORAL
Qty: 20 TABLET | Refills: 0 | Status: SHIPPED | OUTPATIENT
Start: 2018-10-29 | End: 2018-11-08

## 2018-11-02 RX ORDER — ACETAMINOPHEN AND CODEINE PHOSPHATE 300; 30 MG/1; MG/1
1 TABLET ORAL
Qty: 20 TABLET | Refills: 0 | OUTPATIENT
Start: 2018-11-02 | End: 2018-11-12

## 2018-11-11 ENCOUNTER — PATIENT MESSAGE (OUTPATIENT)
Dept: OBSTETRICS AND GYNECOLOGY | Facility: CLINIC | Age: 28
End: 2018-11-11

## 2018-11-11 DIAGNOSIS — Z32.00 POSSIBLE PREGNANCY: Primary | ICD-10-CM

## 2018-11-13 ENCOUNTER — OFFICE VISIT (OUTPATIENT)
Dept: OBSTETRICS AND GYNECOLOGY | Facility: CLINIC | Age: 28
End: 2018-11-13
Payer: MEDICAID

## 2018-11-13 ENCOUNTER — LAB VISIT (OUTPATIENT)
Dept: LAB | Facility: HOSPITAL | Age: 28
End: 2018-11-13
Attending: SPECIALIST
Payer: MEDICAID

## 2018-11-13 VITALS — WEIGHT: 135.81 LBS | BODY MASS INDEX: 23.18 KG/M2 | HEIGHT: 64 IN

## 2018-11-13 DIAGNOSIS — N91.2 AMENORRHEA: Primary | ICD-10-CM

## 2018-11-13 DIAGNOSIS — N91.2 AMENORRHEA: ICD-10-CM

## 2018-11-13 DIAGNOSIS — Z32.00 POSSIBLE PREGNANCY: ICD-10-CM

## 2018-11-13 LAB
HCG INTACT+B SERPL-ACNC: <1.2 MIU/ML
HCG INTACT+B SERPL-ACNC: <1.2 MIU/ML

## 2018-11-13 PROCEDURE — 36415 COLL VENOUS BLD VENIPUNCTURE: CPT | Mod: PO

## 2018-11-13 PROCEDURE — 99213 OFFICE O/P EST LOW 20 MIN: CPT | Mod: S$PBB,,, | Performed by: SPECIALIST

## 2018-11-13 PROCEDURE — 99212 OFFICE O/P EST SF 10 MIN: CPT | Mod: PBBFAC,PN | Performed by: SPECIALIST

## 2018-11-13 PROCEDURE — 99999 PR PBB SHADOW E&M-EST. PATIENT-LVL II: CPT | Mod: PBBFAC,,, | Performed by: SPECIALIST

## 2018-11-13 PROCEDURE — 84702 CHORIONIC GONADOTROPIN TEST: CPT

## 2018-11-13 RX ORDER — BUPRENORPHINE AND NALOXONE 8; 2 MG/1; MG/1
FILM, SOLUBLE BUCCAL; SUBLINGUAL
COMMUNITY
End: 2019-02-08

## 2018-11-13 NOTE — PROGRESS NOTES
26 yo WF presents for evaluation amenorrhea x 1 month following DPMA and positive UPT at home. UPT in office today is Negative and pt denies presumptive s/s pregnancy.  Pt attempting to concieve.  Past Medical History:   Diagnosis Date    Abnormal Pap smear     ALLERGIC RHINITIS     Anemia     Chronic back pain     Drug-seeking behavior 4/16/2012    Epilepsy     GERD (gastroesophageal reflux disease)     Opioid dependence 4/16/2012    Pre-eclampsia     Seizures     last-10/2011, EEG-normal    Urinary tract infection        Past Surgical History:   Procedure Laterality Date    CHOLECYSTECTOMY      CHOLECYSTECTOMY, LAPAROSCOPIC N/A 2/22/2012    [facility]    HYSTEROSCOPY N/A 1/17/2018    [facility]    LAPAROSCOPY-DIAGNOSTIC with removal of IUD N/A 1/17/2018    [facility] at [facility]    mirena removal  02/2018    PELVIC LAPAROSCOPY      iud retrieval     VAGINAL DELIVERY      times 4    vestibulectomy      WISDOM TOOTH EXTRACTION         Family History   Problem Relation Age of Onset    Breast cancer Neg Hx     Ovarian cancer Neg Hx        Social History     Socioeconomic History    Marital status: Single     Spouse name: Not on file    Number of children: Not on file    Years of education: Not on file    Highest education level: Not on file   Social Needs    Financial resource strain: Not on file    Food insecurity - worry: Not on file    Food insecurity - inability: Not on file    Transportation needs - medical: Not on file    Transportation needs - non-medical: Not on file   Occupational History    Not on file   Tobacco Use    Smoking status: Current Every Day Smoker     Packs/day: 0.25     Years: 5.00     Pack years: 1.25     Types: Cigarettes    Smokeless tobacco: Never Used   Substance and Sexual Activity    Alcohol use: Yes     Comment: occasional    Drug use: Yes     Types: Hydrocodone     Sexual activity: Not Currently     Birth control/protection: Injection   Other Topics Concern    Not on file   Social History Narrative    Not on file       Current Outpatient Medications   Medication Sig Dispense Refill    buprenorphine-naloxone (SUBOXONE) 8-2 mg Film Place under the tongue.      butalbital-acetaminophen-caffeine -40 mg (FIORICET, ESGIC) -40 mg per tablet Take 1 tablet by mouth 2 (two) times daily as needed. 60 tablet 0    citalopram (CELEXA) 40 MG tablet TAKE ONE TABLET BY MOUTH ONCE DAILY 90 tablet 4    diazePAM (VALIUM) 5 MG tablet Take 5 mg by mouth once daily.       gabapentin (NEURONTIN) 800 MG tablet Take 800 mg by mouth 4 (four) times daily.       levETIRAcetam (KEPPRA) 750 MG Tab Take 1,500 mg by mouth 2 (two) times daily.  1    PNV,calcium 72-iron-folic acid (PRENATAL VITAMIN PLUS LOW IRON) 27 mg iron- 1 mg Tab Take 1 tablet (1 each total) by mouth once daily. 100 tablet 2    ranitidine (ZANTAC) 150 MG tablet Take 1 tablet (150 mg total) by mouth 2 (two) times daily as needed. 60 tablet 11     No current facility-administered medications for this visit.        Review of patient's allergies indicates:   Allergen Reactions    Darvocet a500 [propoxyphene n-acetaminophen]     Phenytoin sodium extended Other (See Comments)       Review of System:   General: no chills, fever, night sweats, weight gain or weight loss  Psychological: no depression or suicidal ideation  Breasts: no new or changing breast lumps, nipple discharge or masses.  Respiratory: no cough, shortness of breath, or wheezing  Cardiovascular: no chest pain or dyspnea on exertion  Gastrointestinal: no abdominal pain, change in bowel habits, or black or bloody stools  Genito-Urinary: no incontinence, urinary frequency/urgency or vulvar/vaginal symptoms, pelvic pain. POSITIVE AMENORRHEA  Musculoskeletal: no gait disturbance or muscular weakness    I discussed false positive UPT and will obtain [facility] per  pt request  Will follow

## 2018-11-14 ENCOUNTER — PATIENT MESSAGE (OUTPATIENT)
Dept: OBSTETRICS AND GYNECOLOGY | Facility: CLINIC | Age: 28
End: 2018-11-14

## 2018-11-15 ENCOUNTER — PATIENT MESSAGE (OUTPATIENT)
Dept: OBSTETRICS AND GYNECOLOGY | Facility: CLINIC | Age: 28
End: 2018-11-15

## 2018-11-16 ENCOUNTER — PATIENT MESSAGE (OUTPATIENT)
Dept: FAMILY MEDICINE | Facility: CLINIC | Age: 28
End: 2018-11-16

## 2018-11-16 RX ORDER — AMOXICILLIN 875 MG/1
875 TABLET, FILM COATED ORAL EVERY 12 HOURS
Qty: 20 TABLET | Refills: 0 | Status: SHIPPED | OUTPATIENT
Start: 2018-11-16 | End: 2019-02-08

## 2018-11-23 ENCOUNTER — PATIENT MESSAGE (OUTPATIENT)
Dept: OBSTETRICS AND GYNECOLOGY | Facility: CLINIC | Age: 28
End: 2018-11-23

## 2019-01-15 ENCOUNTER — TELEPHONE (OUTPATIENT)
Dept: OBSTETRICS AND GYNECOLOGY | Facility: CLINIC | Age: 29
End: 2019-01-15

## 2019-01-15 NOTE — TELEPHONE ENCOUNTER
----- Message from Kev Raymond sent at 1/15/2019  9:32 AM CST -----  Type: Needs Medical Advice    Who Called:  Grand mother- Binta Sanford   Symptoms (please be specific): NA  How long has patient had these symptoms:  NA  Pharmacy name and phone #:  [facility] Call Back Number: [phone] Additional Information: Patient's grand mother called stating the patient went to [facility] in [facility] for depo shot. The orders is not in the system for the depo shot.

## 2019-01-16 ENCOUNTER — PATIENT MESSAGE (OUTPATIENT)
Dept: OBSTETRICS AND GYNECOLOGY | Facility: CLINIC | Age: 29
End: 2019-01-16

## 2019-01-16 ENCOUNTER — TELEPHONE (OUTPATIENT)
Dept: FAMILY MEDICINE | Facility: CLINIC | Age: 29
End: 2019-01-16

## 2019-01-16 ENCOUNTER — PATIENT MESSAGE (OUTPATIENT)
Dept: FAMILY MEDICINE | Facility: CLINIC | Age: 29
End: 2019-01-16

## 2019-01-16 RX ORDER — BUTALBITAL, ACETAMINOPHEN AND CAFFEINE 50; 325; 40 MG/1; MG/1; MG/1
1 TABLET ORAL 2 TIMES DAILY PRN
Qty: 60 TABLET | Refills: 0 | Status: SHIPPED | OUTPATIENT
Start: 2019-01-16 | End: 2019-05-10 | Stop reason: SDUPTHER

## 2019-01-16 NOTE — TELEPHONE ENCOUNTER
**FYI**   Refill for pts fiorcet that was just approved has been cancelled due to pt is getting this from her neurololgist

## 2019-01-16 NOTE — TELEPHONE ENCOUNTER
----- Message from Edna Vicente sent at 1/16/2019  3:48 PM CST -----  Contact: [facility]  She is calling stating that the pt is already prescribed a certain medication from a different provider at [facility] ,please advise [phone]

## 2019-01-17 ENCOUNTER — TELEPHONE (OUTPATIENT)
Dept: OBSTETRICS AND GYNECOLOGY | Facility: CLINIC | Age: 29
End: 2019-01-17

## 2019-01-17 RX ORDER — MEDROXYPROGESTERONE ACETATE 150 MG/ML
150 INJECTION, SUSPENSION INTRAMUSCULAR ONCE
Qty: 1 ML | Refills: 0 | Status: SHIPPED | OUTPATIENT
Start: 2019-01-17 | End: 2019-01-17

## 2019-01-17 NOTE — TELEPHONE ENCOUNTER
----- Message from Donna Longoria sent at 1/17/2019 11:35 AM CST -----  Contact: Binta - monoe  Type: Needs Medical Advice    Who Called:  Binta rae  Additional Information: Patient she appointment on Monday, 1/21/19, and she wants to know if the depo shot is available there or if she has to  a RX for it.    Please call to advise -- [phone] (Binta's cell)  Thank you

## 2019-01-18 ENCOUNTER — PATIENT MESSAGE (OUTPATIENT)
Dept: FAMILY MEDICINE | Facility: CLINIC | Age: 29
End: 2019-01-18

## 2019-01-19 ENCOUNTER — PATIENT MESSAGE (OUTPATIENT)
Dept: OBSTETRICS AND GYNECOLOGY | Facility: CLINIC | Age: 29
End: 2019-01-19

## 2019-02-08 ENCOUNTER — OFFICE VISIT (OUTPATIENT)
Dept: OBSTETRICS AND GYNECOLOGY | Facility: CLINIC | Age: 29
End: 2019-02-08
Payer: MEDICAID

## 2019-02-08 VITALS
WEIGHT: 127.44 LBS | BODY MASS INDEX: 21.87 KG/M2 | DIASTOLIC BLOOD PRESSURE: 82 MMHG | SYSTOLIC BLOOD PRESSURE: 116 MMHG

## 2019-02-08 DIAGNOSIS — Z01.419 ENCOUNTER FOR GYNECOLOGICAL EXAMINATION WITHOUT ABNORMAL FINDING: Primary | ICD-10-CM

## 2019-02-08 PROCEDURE — 99213 OFFICE O/P EST LOW 20 MIN: CPT | Mod: PBBFAC,PN | Performed by: SPECIALIST

## 2019-02-08 PROCEDURE — 99395 PR PREVENTIVE VISIT,EST,18-39: ICD-10-PCS | Mod: S$PBB,,, | Performed by: SPECIALIST

## 2019-02-08 PROCEDURE — 87624 HPV HI-RISK TYP POOLED RSLT: CPT

## 2019-02-08 PROCEDURE — 88175 CYTOPATH C/V AUTO FLUID REDO: CPT

## 2019-02-08 PROCEDURE — 99999 PR PBB SHADOW E&M-EST. PATIENT-LVL III: CPT | Mod: PBBFAC,,, | Performed by: SPECIALIST

## 2019-02-08 PROCEDURE — 99395 PREV VISIT EST AGE 18-39: CPT | Mod: S$PBB,,, | Performed by: SPECIALIST

## 2019-02-08 PROCEDURE — 99999 PR PBB SHADOW E&M-EST. PATIENT-LVL III: ICD-10-PCS | Mod: PBBFAC,,, | Performed by: SPECIALIST

## 2019-02-08 RX ORDER — KETOROLAC TROMETHAMINE 10 MG/1
10 TABLET, FILM COATED ORAL EVERY 6 HOURS
Qty: 30 TABLET | Refills: 0 | Status: SHIPPED | OUTPATIENT
Start: 2019-02-08 | End: 2019-03-22

## 2019-02-08 RX ORDER — IBUPROFEN 800 MG/1
800 TABLET ORAL 3 TIMES DAILY
Refills: 12 | COMMUNITY
Start: 2019-01-23 | End: 2020-01-02

## 2019-02-08 RX ORDER — PHENOBARBITAL 97.2 MG/1
97.2 TABLET ORAL 2 TIMES DAILY
Refills: 1 | COMMUNITY
Start: 2019-02-05 | End: 2020-01-02

## 2019-02-08 RX ORDER — MEDROXYPROGESTERONE ACETATE 150 MG/ML
INJECTION, SUSPENSION INTRAMUSCULAR
Refills: 0 | COMMUNITY
Start: 2019-01-17 | End: 2019-04-24

## 2019-02-08 NOTE — PROGRESS NOTES
27 yo WF presents for gyn evaluation and continued contraceptive management.  Past Medical History:   Diagnosis Date    Abnormal Pap smear     ALLERGIC RHINITIS     Anemia     Chronic back pain     Drug-seeking behavior 4/16/2012    Epilepsy     GERD (gastroesophageal reflux disease)     Opioid dependence 4/16/2012    Pre-eclampsia     Seizures     last-10/2011, EEG-normal    Urinary tract infection        Past Surgical History:   Procedure Laterality Date    CHOLECYSTECTOMY      CHOLECYSTECTOMY, LAPAROSCOPIC N/A 2/22/2012    [facility]    HYSTEROSCOPY N/A 1/17/2018    [facility]    LAPAROSCOPY-DIAGNOSTIC with removal of IUD N/A 1/17/2018    [facility] at [facility]    mirena removal  02/2018    PELVIC LAPAROSCOPY      iud retrieval     VAGINAL DELIVERY      times 4    vestibulectomy      WISDOM TOOTH EXTRACTION         Family History   Problem Relation Age of Onset    Breast cancer Neg Hx     Ovarian cancer Neg Hx        Social History     Socioeconomic History    Marital status: Single     Spouse name: None    Number of children: None    Years of education: None    Highest education level: None   Social Needs    Financial resource strain: None    Food insecurity - worry: None    Food insecurity - inability: None    Transportation needs - medical: None    Transportation needs - non-medical: None   Occupational History    None   Tobacco Use    Smoking status: Current Every Day Smoker     Packs/day: 0.25     Years: 5.00     Pack years: 1.25     Types: Cigarettes    Smokeless tobacco: Never Used   Substance and Sexual Activity    Alcohol use: Yes     Comment: occasional    Drug use: Yes     Types: Hydrocodone    Sexual activity: Not Currently     Birth control/protection: Injection   Other Topics Concern    None   Social History Narrative    None       Current Outpatient Medications    Medication Sig Dispense Refill    butalbital-acetaminophen-caffeine -40 mg (FIORICET, ESGIC) -40 mg per tablet Take 1 tablet by mouth 2 (two) times daily as needed. 60 tablet 0    citalopram (CELEXA) 40 MG tablet TAKE ONE TABLET BY MOUTH ONCE DAILY 90 tablet 4    diazePAM (VALIUM) 5 MG tablet Take 5 mg by mouth once daily.       gabapentin (NEURONTIN) 800 MG tablet Take 800 mg by mouth 4 (four) times daily.       ibuprofen (ADVIL,MOTRIN) 800 MG tablet Take 800 mg by mouth 3 (three) times daily.  12    levETIRAcetam (KEPPRA) 750 MG Tab Take 1,500 mg by mouth 2 (two) times daily.  1    medroxyPROGESTERone (DEPO-PROVERA) 150 mg/mL Syrg inject 1ml intramuscularly once monthly  0    PHENobarbital (LUMINAL) 97.2 MG tablet Take 97.2 mg by mouth 2 (two) times daily.  1    PNV,calcium 72-iron-folic acid (PRENATAL VITAMIN PLUS LOW IRON) 27 mg iron- 1 mg Tab Take 1 tablet (1 each total) by mouth once daily. 100 tablet 2    prenatal vits96-iron fum-folic 27 mg iron- 800 mcg Tab tablet Take 1 tablet by mouth once daily.  2    ranitidine (ZANTAC) 150 MG tablet Take 1 tablet (150 mg total) by mouth 2 (two) times daily as needed. 60 tablet 11     No current facility-administered medications for this visit.        Review of patient's allergies indicates:   Allergen Reactions    Darvocet a500 [propoxyphene n-acetaminophen]     Phenytoin sodium extended Other (See Comments)       Review of System:   General: no chills, fever, night sweats, weight gain or weight loss  Psychological: no depression or suicidal ideation  Breasts: no new or changing breast lumps, nipple discharge or masses.  Respiratory: no cough, shortness of breath, or wheezing  Cardiovascular: no chest pain or dyspnea on exertion  Gastrointestinal: no abdominal pain, change in bowel habits, or black or bloody stools  Genito-Urinary: no incontinence, urinary frequency/urgency or vulvar/vaginal symptoms, pelvic pain or abnormal vaginal  bleeding.  Musculoskeletal: no gait disturbance or muscular weakness                                              General Appearance    A and O x 4, Cooperative, no distress   Breasts    Abdomen   Symmetrical, no masses, no discharge, skin changes , erythema or retraction. No adenopathy  Soft, non-tender, bowel sounds active all four quadrants,  no masses, no organomegaly    Genitourinary:   External rectal exam shows no thrombosed external hemorrhoids.   Pelvic exam was performed with patient supine.  No labial fusion.  There is no rash, lesion or injury on the right labia.  There is no rash, lesion or injury on the left labia.  No bleeding and no signs of injury around the vaginal introitus, urethra is without lesions and well supported. The cervix is visualized with no discharge, lesions or friability.  No vaginal discharge found.  No significant Cystocele, Enterocele or rectocele, and uterus well supported.  Bimanual exam:  The urethra is normal to palpation and there are no palpable vaginal wall masses.  Uterus is not deviated, not enlarged, not fixed, normal shape and not tender.  Cervix exhibits no motion tenderness.   Right adnexum displays no mass and no tenderness.  Left adnexum displays no mass and no tenderness.   Extremities: Extremities normal, atraumatic, no cyanosis or edema                       PAP submitted    Plan Continue DPMA q 3 months

## 2019-02-12 ENCOUNTER — PATIENT MESSAGE (OUTPATIENT)
Dept: OBSTETRICS AND GYNECOLOGY | Facility: CLINIC | Age: 29
End: 2019-02-12

## 2019-02-12 ENCOUNTER — PATIENT MESSAGE (OUTPATIENT)
Dept: FAMILY MEDICINE | Facility: CLINIC | Age: 29
End: 2019-02-12

## 2019-02-12 DIAGNOSIS — G40.909 SEIZURE DISORDER: Primary | ICD-10-CM

## 2019-02-12 DIAGNOSIS — Z11.3 SCREENING EXAMINATION FOR STD (SEXUALLY TRANSMITTED DISEASE): Primary | ICD-10-CM

## 2019-02-13 ENCOUNTER — PATIENT MESSAGE (OUTPATIENT)
Dept: OBSTETRICS AND GYNECOLOGY | Facility: CLINIC | Age: 29
End: 2019-02-13

## 2019-02-13 ENCOUNTER — PATIENT MESSAGE (OUTPATIENT)
Dept: FAMILY MEDICINE | Facility: CLINIC | Age: 29
End: 2019-02-13

## 2019-02-13 NOTE — TELEPHONE ENCOUNTER
"Patient requesting Rx for Tylenol 3, states having "bad period like cramps" and back pain, alternating Toradol and Ibuprofen with no relief  "

## 2019-02-14 ENCOUNTER — PATIENT MESSAGE (OUTPATIENT)
Dept: OBSTETRICS AND GYNECOLOGY | Facility: CLINIC | Age: 29
End: 2019-02-14

## 2019-02-14 LAB
HPV HR 12 DNA CVX QL NAA+PROBE: NEGATIVE
HPV16 AG SPEC QL: NEGATIVE
HPV18 DNA SPEC QL NAA+PROBE: NEGATIVE

## 2019-03-07 ENCOUNTER — PATIENT MESSAGE (OUTPATIENT)
Dept: OBSTETRICS AND GYNECOLOGY | Facility: CLINIC | Age: 29
End: 2019-03-07

## 2019-03-07 DIAGNOSIS — N91.2 ABSENT MENSES: Primary | ICD-10-CM

## 2019-03-22 ENCOUNTER — PATIENT MESSAGE (OUTPATIENT)
Dept: FAMILY MEDICINE | Facility: CLINIC | Age: 29
End: 2019-03-22

## 2019-03-22 ENCOUNTER — PATIENT MESSAGE (OUTPATIENT)
Dept: OBSTETRICS AND GYNECOLOGY | Facility: CLINIC | Age: 29
End: 2019-03-22

## 2019-03-22 RX ORDER — KETOROLAC TROMETHAMINE 10 MG/1
10 TABLET, FILM COATED ORAL EVERY 6 HOURS
Qty: 20 TABLET | Refills: 0 | Status: SHIPPED | OUTPATIENT
Start: 2019-03-22 | End: 2019-04-24 | Stop reason: SDUPTHER

## 2019-03-28 ENCOUNTER — TELEPHONE (OUTPATIENT)
Dept: FAMILY MEDICINE | Facility: CLINIC | Age: 29
End: 2019-03-28

## 2019-03-28 DIAGNOSIS — Z30.42 ENCOUNTER FOR SURVEILLANCE OF INJECTABLE CONTRACEPTIVE: Primary | ICD-10-CM

## 2019-03-28 RX ORDER — MEDROXYPROGESTERONE ACETATE 150 MG/ML
150 INJECTION, SUSPENSION INTRAMUSCULAR
Status: DISCONTINUED | OUTPATIENT
Start: 2019-03-28 | End: 2019-04-24

## 2019-03-28 RX ORDER — NORETHINDRONE ACETATE/ETHINYL ESTRADIOL 1MG-20MCG
KIT ORAL
COMMUNITY
Start: 2019-03-22 | End: 2019-03-28

## 2019-03-28 RX ORDER — MEDROXYPROGESTERONE ACETATE 150 MG/ML
150 INJECTION, SUSPENSION INTRAMUSCULAR
Qty: 4 ML | Refills: 0 | Status: SHIPPED | OUTPATIENT
Start: 2019-03-28 | End: 2019-04-24

## 2019-03-28 RX ORDER — B-COMPLEX WITH VITAMIN C
TABLET ORAL
COMMUNITY
Start: 2019-03-22 | End: 2019-03-28

## 2019-03-28 NOTE — TELEPHONE ENCOUNTER
pts grandmother advised that orders need to be put in from Dr. Campo office. They will call to have them updated

## 2019-03-28 NOTE — TELEPHONE ENCOUNTER
----- Message from Roxana Santiago sent at 3/28/2019  1:47 PM CDT -----  Contact: grandmotherBinta  Patient'sgrandmotherBinta  is calling regarding patient's depo injections. She states the order needs to be put in the system so she can get it done in [place]. Please call grandmother at [phone]. Thanks!

## 2019-03-28 NOTE — TELEPHONE ENCOUNTER
----- Message from Antonia Samuel sent at 3/28/2019  1:17 PM CDT -----  Contact: [facility]  CALLING TO SCHEDULE A DEPO SHOT FOR PATIENT. PLEASE CALL LEMUEL @ [phone]. THANKS, TREVOR

## 2019-04-09 ENCOUNTER — LAB VISIT (OUTPATIENT)
Dept: LAB | Facility: HOSPITAL | Age: 29
End: 2019-04-09
Attending: FAMILY MEDICINE
Payer: MEDICAID

## 2019-04-09 DIAGNOSIS — G40.909 SEIZURE DISORDER: ICD-10-CM

## 2019-04-09 DIAGNOSIS — R73.09 ELEVATED GLUCOSE: ICD-10-CM

## 2019-04-09 LAB
ALBUMIN SERPL BCP-MCNC: 3.7 G/DL (ref 3.5–5.2)
ALP SERPL-CCNC: 113 U/L (ref 55–135)
ALT SERPL W/O P-5'-P-CCNC: 32 U/L (ref 10–44)
ANION GAP SERPL CALC-SCNC: 8 MMOL/L (ref 8–16)
AST SERPL-CCNC: 32 U/L (ref 10–40)
BILIRUB SERPL-MCNC: 0.3 MG/DL (ref 0.1–1)
BUN SERPL-MCNC: 11 MG/DL (ref 6–20)
CALCIUM SERPL-MCNC: 9.4 MG/DL (ref 8.7–10.5)
CHLORIDE SERPL-SCNC: 99 MMOL/L (ref 95–110)
CO2 SERPL-SCNC: 31 MMOL/L (ref 23–29)
CREAT SERPL-MCNC: 0.7 MG/DL (ref 0.5–1.4)
EST. GFR  (AFRICAN AMERICAN): >60 ML/MIN/1.73 M^2
EST. GFR  (NON AFRICAN AMERICAN): >60 ML/MIN/1.73 M^2
ESTIMATED AVG GLUCOSE: 74 MG/DL (ref 68–131)
GLUCOSE SERPL-MCNC: 94 MG/DL (ref 70–110)
HBA1C MFR BLD HPLC: 4.2 % (ref 4–5.6)
POTASSIUM SERPL-SCNC: 4.5 MMOL/L (ref 3.5–5.1)
PROT SERPL-MCNC: 7 G/DL (ref 6–8.4)
SODIUM SERPL-SCNC: 138 MMOL/L (ref 136–145)

## 2019-04-09 PROCEDURE — 80053 COMPREHEN METABOLIC PANEL: CPT

## 2019-04-09 PROCEDURE — 36415 COLL VENOUS BLD VENIPUNCTURE: CPT | Mod: PO

## 2019-04-09 PROCEDURE — 83036 HEMOGLOBIN GLYCOSYLATED A1C: CPT

## 2019-04-19 ENCOUNTER — PATIENT MESSAGE (OUTPATIENT)
Dept: OBSTETRICS AND GYNECOLOGY | Facility: CLINIC | Age: 29
End: 2019-04-19

## 2019-04-21 ENCOUNTER — PATIENT MESSAGE (OUTPATIENT)
Dept: FAMILY MEDICINE | Facility: CLINIC | Age: 29
End: 2019-04-21

## 2019-04-24 ENCOUNTER — PATIENT MESSAGE (OUTPATIENT)
Dept: FAMILY MEDICINE | Facility: CLINIC | Age: 29
End: 2019-04-24

## 2019-04-24 ENCOUNTER — HOSPITAL ENCOUNTER (OUTPATIENT)
Dept: RADIOLOGY | Facility: HOSPITAL | Age: 29
Discharge: HOME | End: 2019-04-24
Attending: FAMILY MEDICINE
Payer: MEDICAID

## 2019-04-24 ENCOUNTER — TELEPHONE (OUTPATIENT)
Dept: FAMILY MEDICINE | Facility: CLINIC | Age: 29
End: 2019-04-24

## 2019-04-24 ENCOUNTER — OFFICE VISIT (OUTPATIENT)
Dept: FAMILY MEDICINE | Facility: CLINIC | Age: 29
End: 2019-04-24
Payer: MEDICAID

## 2019-04-24 ENCOUNTER — PATIENT MESSAGE (OUTPATIENT)
Dept: OBSTETRICS AND GYNECOLOGY | Facility: CLINIC | Age: 29
End: 2019-04-24

## 2019-04-24 VITALS
HEIGHT: 64 IN | TEMPERATURE: 98 F | HEART RATE: 84 BPM | BODY MASS INDEX: 21.75 KG/M2 | SYSTOLIC BLOOD PRESSURE: 116 MMHG | WEIGHT: 127.38 LBS | DIASTOLIC BLOOD PRESSURE: 70 MMHG

## 2019-04-24 DIAGNOSIS — Z11.3 SCREEN FOR STD (SEXUALLY TRANSMITTED DISEASE): Primary | ICD-10-CM

## 2019-04-24 DIAGNOSIS — S80.02XA CONTUSION OF LEFT KNEE, INITIAL ENCOUNTER: Primary | ICD-10-CM

## 2019-04-24 DIAGNOSIS — S80.02XA CONTUSION OF LEFT KNEE, INITIAL ENCOUNTER: ICD-10-CM

## 2019-04-24 PROCEDURE — 73560 X-RAY EXAM OF KNEE 1 OR 2: CPT | Mod: 59,TC,PO,RT

## 2019-04-24 PROCEDURE — 99213 PR OFFICE/OUTPT VISIT, EST, LEVL III, 20-29 MIN: ICD-10-PCS | Mod: S$PBB,,, | Performed by: FAMILY MEDICINE

## 2019-04-24 PROCEDURE — 99999 PR PBB SHADOW E&M-EST. PATIENT-LVL III: CPT | Mod: PBBFAC,,, | Performed by: FAMILY MEDICINE

## 2019-04-24 PROCEDURE — 99999 PR PBB SHADOW E&M-EST. PATIENT-LVL III: ICD-10-PCS | Mod: PBBFAC,,, | Performed by: FAMILY MEDICINE

## 2019-04-24 PROCEDURE — 99213 OFFICE O/P EST LOW 20 MIN: CPT | Mod: PBBFAC,25,PO | Performed by: FAMILY MEDICINE

## 2019-04-24 PROCEDURE — 96372 THER/PROPH/DIAG INJ SC/IM: CPT | Mod: PBBFAC,PO

## 2019-04-24 PROCEDURE — 73562 XR KNEE ORTHO LEFT: ICD-10-PCS | Mod: 26,LT,, | Performed by: RADIOLOGY

## 2019-04-24 PROCEDURE — 73562 X-RAY EXAM OF KNEE 3: CPT | Mod: 26,LT,, | Performed by: RADIOLOGY

## 2019-04-24 PROCEDURE — 99213 OFFICE O/P EST LOW 20 MIN: CPT | Mod: S$PBB,,, | Performed by: FAMILY MEDICINE

## 2019-04-24 RX ORDER — DEXAMETHASONE SODIUM PHOSPHATE 4 MG/ML
8 INJECTION, SOLUTION INTRA-ARTICULAR; INTRALESIONAL; INTRAMUSCULAR; INTRAVENOUS; SOFT TISSUE ONCE
Status: COMPLETED | OUTPATIENT
Start: 2019-04-24 | End: 2019-04-24

## 2019-04-24 RX ORDER — KETOROLAC TROMETHAMINE 10 MG/1
10 TABLET, FILM COATED ORAL EVERY 6 HOURS
Qty: 20 TABLET | Refills: 0 | Status: SHIPPED | OUTPATIENT
Start: 2019-04-24 | End: 2020-01-02

## 2019-04-24 RX ADMIN — DEXAMETHASONE SODIUM PHOSPHATE 8 MG: 4 INJECTION, SOLUTION INTRAMUSCULAR; INTRAVENOUS at 02:04

## 2019-04-24 NOTE — TELEPHONE ENCOUNTER
Patient stated she forgot to ask you for refills of tylenol #3 and toradol for her knee pain to [facility], please advise

## 2019-04-24 NOTE — PROGRESS NOTES
The patient presents with tender painful lt knee for the past 3 days   Following fall onto knee. Has swelling and decr rom   ROS:  General: No fever or sig wt change  HEENT:No other PND eye pain or dc  Respiratory: No cough wheezing  PE: vital signs noted  HEENT: Normocephalic,with no recent trauma,PERRLA,EOMI,conjunctiva injected with no exudate.Nasopharynx is injected and edematous.External otic canal edematous and injected TM dull  Neck:Supple without adenopathy  Chest:Clear bilateral breath sounds    Heart:Regular rhthym without murmer  Abdomen:Soft, non tender,no masses, no hepatosplenomegaly  Extremeties Tender patella no lateral and medial laxity limited flexion  Neurologic:Grossly within normal limits     Impression:Knee contusion   Plan: XR    Dexa 8 im  Toradol   If not better next week Ortho con

## 2019-04-25 ENCOUNTER — PATIENT MESSAGE (OUTPATIENT)
Dept: FAMILY MEDICINE | Facility: CLINIC | Age: 29
End: 2019-04-25

## 2019-04-25 ENCOUNTER — TELEPHONE (OUTPATIENT)
Dept: FAMILY MEDICINE | Facility: CLINIC | Age: 29
End: 2019-04-25

## 2019-04-25 DIAGNOSIS — S80.02XA CONTUSION OF LEFT KNEE, INITIAL ENCOUNTER: Primary | ICD-10-CM

## 2019-04-28 ENCOUNTER — PATIENT MESSAGE (OUTPATIENT)
Dept: OBSTETRICS AND GYNECOLOGY | Facility: CLINIC | Age: 29
End: 2019-04-28

## 2019-04-29 ENCOUNTER — PATIENT MESSAGE (OUTPATIENT)
Dept: FAMILY MEDICINE | Facility: CLINIC | Age: 29
End: 2019-04-29

## 2019-05-02 ENCOUNTER — PATIENT MESSAGE (OUTPATIENT)
Dept: FAMILY MEDICINE | Facility: CLINIC | Age: 29
End: 2019-05-02

## 2019-05-02 RX ORDER — VARENICLINE TARTRATE 0.5 (11)-1
KIT ORAL
Qty: 1 PACKAGE | Refills: 0 | Status: SHIPPED | OUTPATIENT
Start: 2019-05-02 | End: 2019-05-28 | Stop reason: SDUPTHER

## 2019-05-02 NOTE — TELEPHONE ENCOUNTER
Spoke to someone with the smoking cessation clinic, she does not qualify because she has to be atleast 37. I will send rx request

## 2019-05-10 ENCOUNTER — PATIENT MESSAGE (OUTPATIENT)
Dept: OBSTETRICS AND GYNECOLOGY | Facility: CLINIC | Age: 29
End: 2019-05-10

## 2019-05-10 ENCOUNTER — PATIENT MESSAGE (OUTPATIENT)
Dept: FAMILY MEDICINE | Facility: CLINIC | Age: 29
End: 2019-05-10

## 2019-05-10 RX ORDER — BUTALBITAL, ACETAMINOPHEN AND CAFFEINE 50; 325; 40 MG/1; MG/1; MG/1
1 TABLET ORAL 2 TIMES DAILY PRN
Qty: 45 TABLET | Refills: 0 | Status: SHIPPED | OUTPATIENT
Start: 2019-05-10 | End: 2019-05-29

## 2019-05-14 ENCOUNTER — PATIENT MESSAGE (OUTPATIENT)
Dept: OBSTETRICS AND GYNECOLOGY | Facility: CLINIC | Age: 29
End: 2019-05-14

## 2019-05-21 ENCOUNTER — PATIENT MESSAGE (OUTPATIENT)
Dept: OBSTETRICS AND GYNECOLOGY | Facility: CLINIC | Age: 29
End: 2019-05-21

## 2019-05-28 DIAGNOSIS — R10.11 RIGHT UPPER QUADRANT PAIN: ICD-10-CM

## 2019-05-28 RX ORDER — VARENICLINE TARTRATE 0.5 (11)-1
KIT ORAL
Qty: 1 PACKAGE | Refills: 4 | Status: SHIPPED | OUTPATIENT
Start: 2019-05-28 | End: 2020-01-06

## 2019-05-28 RX ORDER — BUTALBITAL, ACETAMINOPHEN AND CAFFEINE 50; 325; 40 MG/1; MG/1; MG/1
1 TABLET ORAL 2 TIMES DAILY PRN
Qty: 45 TABLET | Refills: 0 | Status: CANCELLED | OUTPATIENT
Start: 2019-05-28

## 2019-05-29 ENCOUNTER — PATIENT MESSAGE (OUTPATIENT)
Dept: FAMILY MEDICINE | Facility: CLINIC | Age: 29
End: 2019-05-29

## 2019-05-29 ENCOUNTER — PATIENT MESSAGE (OUTPATIENT)
Dept: OBSTETRICS AND GYNECOLOGY | Facility: CLINIC | Age: 29
End: 2019-05-29

## 2019-05-29 RX ORDER — BUTALBITAL, ACETAMINOPHEN AND CAFFEINE 50; 325; 40 MG/1; MG/1; MG/1
1 TABLET ORAL 2 TIMES DAILY PRN
Qty: 30 TABLET | Refills: 0 | Status: SHIPPED | OUTPATIENT
Start: 2019-05-29 | End: 2019-06-18 | Stop reason: SDUPTHER

## 2019-05-30 ENCOUNTER — PATIENT MESSAGE (OUTPATIENT)
Dept: FAMILY MEDICINE | Facility: CLINIC | Age: 29
End: 2019-05-30

## 2019-06-13 ENCOUNTER — PATIENT MESSAGE (OUTPATIENT)
Dept: OBSTETRICS AND GYNECOLOGY | Facility: CLINIC | Age: 29
End: 2019-06-13

## 2019-06-13 DIAGNOSIS — Z32.00 POSSIBLE PREGNANCY: Primary | ICD-10-CM

## 2019-06-13 NOTE — TELEPHONE ENCOUNTER
Patient is requesting labs to determine if she's pregnant and wishes to do it tomorrow. Please advise. Patient last saw you 2/8/19

## 2019-06-19 RX ORDER — BUTALBITAL, ACETAMINOPHEN AND CAFFEINE 50; 325; 40 MG/1; MG/1; MG/1
TABLET ORAL
Qty: 30 TABLET | Refills: 1 | Status: SHIPPED | OUTPATIENT
Start: 2019-06-19 | End: 2019-07-11 | Stop reason: SDUPTHER

## 2019-06-22 ENCOUNTER — PATIENT MESSAGE (OUTPATIENT)
Dept: FAMILY MEDICINE | Facility: CLINIC | Age: 29
End: 2019-06-22

## 2019-06-24 ENCOUNTER — TELEPHONE (OUTPATIENT)
Dept: FAMILY MEDICINE | Facility: CLINIC | Age: 29
End: 2019-06-24

## 2019-06-24 NOTE — TELEPHONE ENCOUNTER
----- Message from Lance Lazaro sent at 6/24/2019 10:33 AM CDT -----  Contact: Freya ([facility])  Caller is requesting the pt most recent visit notes and also needs to see what type of back brace the pt would need. Fax notes yo [phone] and give Freya a call at [phone]

## 2019-06-30 ENCOUNTER — PATIENT MESSAGE (OUTPATIENT)
Dept: FAMILY MEDICINE | Facility: CLINIC | Age: 29
End: 2019-06-30

## 2019-07-01 ENCOUNTER — PATIENT MESSAGE (OUTPATIENT)
Dept: FAMILY MEDICINE | Facility: CLINIC | Age: 29
End: 2019-07-01

## 2019-07-01 NOTE — TELEPHONE ENCOUNTER
Ok to rf early -but unless Dr Barrios is dealing with her headaches(and if he is he should be rx this) I rec she see our HA specialists as continued use of this med is not recommended

## 2019-07-03 ENCOUNTER — PATIENT MESSAGE (OUTPATIENT)
Dept: FAMILY MEDICINE | Facility: CLINIC | Age: 29
End: 2019-07-03

## 2019-07-07 RX ORDER — MEDROXYPROGESTERONE ACETATE 150 MG/ML
150 INJECTION, SUSPENSION INTRAMUSCULAR
Status: DISCONTINUED | OUTPATIENT
Start: 2019-07-07 | End: 2022-04-10 | Stop reason: HOSPADM

## 2019-07-08 ENCOUNTER — PATIENT MESSAGE (OUTPATIENT)
Dept: FAMILY MEDICINE | Facility: CLINIC | Age: 29
End: 2019-07-08

## 2019-07-08 ENCOUNTER — TELEPHONE (OUTPATIENT)
Dept: FAMILY MEDICINE | Facility: CLINIC | Age: 29
End: 2019-07-08

## 2019-07-08 NOTE — TELEPHONE ENCOUNTER
----- Message from Gwendolyn Villalobos sent at 7/8/2019  8:52 AM CDT -----  Contact: Patient's mother, Binta  Ms Binta needs to schedule her daughters depo shot, please call  Her back at [phone]. Thank you

## 2019-07-11 RX ORDER — BUTALBITAL, ACETAMINOPHEN AND CAFFEINE 50; 325; 40 MG/1; MG/1; MG/1
TABLET ORAL
Qty: 30 TABLET | Refills: 1 | Status: SHIPPED | OUTPATIENT
Start: 2019-07-11 | End: 2020-02-26

## 2019-07-11 NOTE — TELEPHONE ENCOUNTER
"Sending rx for butalbitol but-last time I said this "Ok to rf early -but unless Dr Barrios is dealing with her headaches(and if he is he should be rx this) I rec she see our HA specialists as continued use of this med is not recommended "  I dont see anything set up?  "

## 2019-07-12 ENCOUNTER — PATIENT MESSAGE (OUTPATIENT)
Dept: FAMILY MEDICINE | Facility: CLINIC | Age: 29
End: 2019-07-12

## 2019-07-15 ENCOUNTER — TELEPHONE (OUTPATIENT)
Dept: FAMILY MEDICINE | Facility: CLINIC | Age: 29
End: 2019-07-15

## 2019-07-15 NOTE — TELEPHONE ENCOUNTER
----- Message from Maria R West sent at 7/15/2019  2:17 PM CDT -----  States she needs to schedule a nurse visit for her shot. Please call Binta Suazo at [phone]. Thank you

## 2019-07-15 NOTE — TELEPHONE ENCOUNTER
----- Message from Thomas Aguilar sent at 7/15/2019  9:46 AM CDT -----  Contact: pt mother   Type:  Needs Medical Advice    Who Called: pt mother   Symptoms (please be specific):   How long has patient had these symptoms:   Pharmacy name and phone #:   Would the patient rather a call back or a response via [facility]?: call   Best Call Back Number: [phone]  Additional Information: caller is requesting a call back from the nurse in regards to the pt getting scheduled for her depo shot

## 2019-07-15 NOTE — TELEPHONE ENCOUNTER
Attempted to return patient call regarding setting up depo injection, unable to leave a message on recorder.

## 2019-07-16 DIAGNOSIS — Z79.890 HORMONE REPLACEMENT THERAPY (HRT): Primary | ICD-10-CM

## 2019-07-16 RX ORDER — MEDROXYPROGESTERONE ACETATE 150 MG/ML
150 INJECTION, SUSPENSION INTRAMUSCULAR
Status: ACTIVE | OUTPATIENT
Start: 2019-07-16 | End: 2020-01-12

## 2019-07-24 ENCOUNTER — PATIENT MESSAGE (OUTPATIENT)
Dept: FAMILY MEDICINE | Facility: CLINIC | Age: 29
End: 2019-07-24

## 2019-07-29 ENCOUNTER — PATIENT MESSAGE (OUTPATIENT)
Dept: FAMILY MEDICINE | Facility: CLINIC | Age: 29
End: 2019-07-29

## 2019-07-29 ENCOUNTER — TELEPHONE (OUTPATIENT)
Dept: FAMILY MEDICINE | Facility: CLINIC | Age: 29
End: 2019-07-29

## 2019-07-29 NOTE — TELEPHONE ENCOUNTER
----- Message from Shyla Mayo sent at 7/29/2019  2:35 PM CDT -----  Type:  Same Day Appointment Request    Caller is requesting a same day appointment.  Caller declined first available appointment listed below.    Name of Caller: Pt  Mom (Binta)  When is the first available appointment?    Symptoms:   Need depo injection  Best Call Back Number:  [phone]  Additional Information:  States they would like to come in tomorrow morning for the injection//had left a message this morning//please call//thanks/[facility]

## 2019-07-29 NOTE — TELEPHONE ENCOUNTER
----- Message from Shyla Mayo sent at 7/29/2019  8:20 AM CDT -----  Type:  Needs Medical Advice    Who Called:  Pt Mom (Binta)  Symptoms (please be specific):   Needing injection   How long has patient had these symptoms:     Pharmacy name and phone #:     Would the patient rather a call back or a response via [facility]?   Call back  Best Call Back Number:    [phone]  Additional Information:  States she calling to make a nurse appt for pt to come in for her Injection//please call//avelino/nicole

## 2019-08-18 ENCOUNTER — PATIENT MESSAGE (OUTPATIENT)
Dept: FAMILY MEDICINE | Facility: CLINIC | Age: 29
End: 2019-08-18

## 2019-08-18 ENCOUNTER — PATIENT MESSAGE (OUTPATIENT)
Dept: OBSTETRICS AND GYNECOLOGY | Facility: CLINIC | Age: 29
End: 2019-08-18

## 2019-08-19 ENCOUNTER — TELEPHONE (OUTPATIENT)
Dept: FAMILY MEDICINE | Facility: CLINIC | Age: 29
End: 2019-08-19

## 2019-08-19 DIAGNOSIS — R51.9 CHRONIC INTRACTABLE HEADACHE, UNSPECIFIED HEADACHE TYPE: Primary | ICD-10-CM

## 2019-08-19 DIAGNOSIS — G89.29 CHRONIC INTRACTABLE HEADACHE, UNSPECIFIED HEADACHE TYPE: Primary | ICD-10-CM

## 2019-08-19 RX ORDER — AMOXICILLIN 500 MG/1
500 TABLET, FILM COATED ORAL EVERY 12 HOURS
Qty: 20 TABLET | Refills: 0 | Status: SHIPPED | OUTPATIENT
Start: 2019-08-19 | End: 2019-08-29

## 2019-08-26 RX ORDER — IBUPROFEN 800 MG/1
TABLET ORAL
Qty: 90 TABLET | Refills: 12 | Status: SHIPPED | OUTPATIENT
Start: 2019-08-26 | End: 2020-03-03

## 2019-08-28 ENCOUNTER — PATIENT MESSAGE (OUTPATIENT)
Dept: FAMILY MEDICINE | Facility: CLINIC | Age: 29
End: 2019-08-28

## 2019-08-28 NOTE — TELEPHONE ENCOUNTER
I just spoke with [facility], they state that she has had a prescription from Dr. Barrios, he gave her a 15 day supply about 15 days ago

## 2019-09-11 RX ORDER — BUTALBITAL, ACETAMINOPHEN AND CAFFEINE 50; 325; 40 MG/1; MG/1; MG/1
1 TABLET ORAL 2 TIMES DAILY
Qty: 30 TABLET | Refills: 1 | OUTPATIENT
Start: 2019-09-11

## 2019-09-18 ENCOUNTER — PATIENT MESSAGE (OUTPATIENT)
Dept: FAMILY MEDICINE | Facility: CLINIC | Age: 29
End: 2019-09-18

## 2019-09-19 NOTE — TELEPHONE ENCOUNTER
This is from her second message:    I also wanted to mention that all of those meds combined really help me and don't make me have any cravings to use herion. I know Dr Werner isn't a big fan of prescribing the Valium but I don't abuse it I take it as prescribed and I also have my grandpa hold it for me and give it to me when it's due to be taken. I am doing outpatient treatment along with meetings and I will be drug tested at the treatment center to make sure I only take the prescription medications that I'm supposed to be on. I hope he will continue to keep me on this medication regimen because it really is working for me and I am doing really well when only taking these medications as prescribed.

## 2019-09-20 ENCOUNTER — TELEPHONE (OUTPATIENT)
Dept: FAMILY MEDICINE | Facility: CLINIC | Age: 29
End: 2019-09-20

## 2019-09-20 ENCOUNTER — PATIENT MESSAGE (OUTPATIENT)
Dept: FAMILY MEDICINE | Facility: CLINIC | Age: 29
End: 2019-09-20

## 2019-09-20 DIAGNOSIS — F41.9 ANXIETY: Primary | ICD-10-CM

## 2019-09-20 RX ORDER — LEVETIRACETAM 750 MG/1
1500 TABLET ORAL 2 TIMES DAILY
Qty: 120 TABLET | Refills: 11 | Status: SHIPPED | OUTPATIENT
Start: 2019-09-20 | End: 2020-10-22 | Stop reason: SDUPTHER

## 2019-09-20 RX ORDER — GABAPENTIN 800 MG/1
800 TABLET ORAL 3 TIMES DAILY
Qty: 90 TABLET | Refills: 11 | Status: SHIPPED | OUTPATIENT
Start: 2019-09-20 | End: 2020-10-22 | Stop reason: SDUPTHER

## 2019-09-20 NOTE — TELEPHONE ENCOUNTER
Sara, Per Dr Werner's previous message he will only prescribe Keppra and Gabapentin, you can check with your pharmacy later today.

## 2019-09-20 NOTE — TELEPHONE ENCOUNTER
Sara I don't think we have psychiatry in the area, I have pended a referral for you, you will also have to call and make appointment yourself.  The number is [phone].

## 2019-09-20 NOTE — TELEPHONE ENCOUNTER
----- Message from Thomas Aguilar sent at 9/20/2019 10:03 AM CDT -----  Contact: pt pharmacy   Type:  Pharmacy Calling to Clarify an RX    Name of Caller: eddie   [facility]     [facility] [address]  [address] [facility]  [place] [address]  Phone: [phone] Fax: [phone]    Prescription Name: gabapentin 800 MG   What do they need to clarify?: dosage  Best Call Back Number:   Additional Information:

## 2019-09-20 NOTE — TELEPHONE ENCOUNTER
Dr Werner received a call from [facility] ([place]), stating patient had gotten gabapentin on 9/7/19 800mg 1 cap QID fr Dr Barrios, then on 9/18 filled gabapentin 300mg 1 BID from rehab Dr Orta, they are calling to see if you still want to have them fill your RX from today

## 2019-09-20 NOTE — TELEPHONE ENCOUNTER
Rx sent but I'm only comfortable with gabapentin 2400 mg /day-higher than that requires a specialist

## 2019-10-21 ENCOUNTER — PATIENT MESSAGE (OUTPATIENT)
Dept: OBSTETRICS AND GYNECOLOGY | Facility: CLINIC | Age: 29
End: 2019-10-21

## 2019-10-21 ENCOUNTER — PATIENT MESSAGE (OUTPATIENT)
Dept: FAMILY MEDICINE | Facility: CLINIC | Age: 29
End: 2019-10-21

## 2019-10-24 ENCOUNTER — PATIENT MESSAGE (OUTPATIENT)
Dept: FAMILY MEDICINE | Facility: CLINIC | Age: 29
End: 2019-10-24

## 2019-10-24 ENCOUNTER — PATIENT MESSAGE (OUTPATIENT)
Dept: OBSTETRICS AND GYNECOLOGY | Facility: CLINIC | Age: 29
End: 2019-10-24

## 2019-11-20 ENCOUNTER — PATIENT MESSAGE (OUTPATIENT)
Dept: FAMILY MEDICINE | Facility: CLINIC | Age: 29
End: 2019-11-20

## 2019-11-21 RX ORDER — BUTALBITAL, ACETAMINOPHEN AND CAFFEINE 50; 325; 40 MG/1; MG/1; MG/1
TABLET ORAL
Qty: 30 TABLET | Refills: 1 | Status: SHIPPED | OUTPATIENT
Start: 2019-11-21 | End: 2020-01-14

## 2019-12-17 ENCOUNTER — PATIENT MESSAGE (OUTPATIENT)
Dept: FAMILY MEDICINE | Facility: CLINIC | Age: 29
End: 2019-12-17

## 2019-12-17 NOTE — TELEPHONE ENCOUNTER
I can see her but also set a fu visit to establish w new PCP in 1month -make sure we have a dc summary

## 2019-12-22 ENCOUNTER — PATIENT MESSAGE (OUTPATIENT)
Dept: OBSTETRICS AND GYNECOLOGY | Facility: CLINIC | Age: 29
End: 2019-12-22

## 2019-12-22 DIAGNOSIS — Z32.00 POSSIBLE PREGNANCY: Primary | ICD-10-CM

## 2019-12-23 ENCOUNTER — PATIENT MESSAGE (OUTPATIENT)
Dept: OBSTETRICS AND GYNECOLOGY | Facility: CLINIC | Age: 29
End: 2019-12-23

## 2019-12-24 RX ORDER — BUTALBITAL, ACETAMINOPHEN AND CAFFEINE 50; 325; 40 MG/1; MG/1; MG/1
1 TABLET ORAL 2 TIMES DAILY
Qty: 30 TABLET | Refills: 1 | OUTPATIENT
Start: 2019-12-24

## 2019-12-24 RX ORDER — B-COMPLEX WITH VITAMIN C
TABLET ORAL
Qty: 100 TABLET | Refills: 3 | Status: SHIPPED | OUTPATIENT
Start: 2019-12-24 | End: 2020-01-23 | Stop reason: SDUPTHER

## 2019-12-26 ENCOUNTER — PATIENT MESSAGE (OUTPATIENT)
Dept: OBSTETRICS AND GYNECOLOGY | Facility: CLINIC | Age: 29
End: 2019-12-26

## 2019-12-26 ENCOUNTER — PATIENT MESSAGE (OUTPATIENT)
Dept: FAMILY MEDICINE | Facility: CLINIC | Age: 29
End: 2019-12-26

## 2019-12-28 ENCOUNTER — PATIENT MESSAGE (OUTPATIENT)
Dept: OBSTETRICS AND GYNECOLOGY | Facility: CLINIC | Age: 29
End: 2019-12-28

## 2020-01-02 ENCOUNTER — PATIENT MESSAGE (OUTPATIENT)
Dept: FAMILY MEDICINE | Facility: CLINIC | Age: 30
End: 2020-01-02

## 2020-01-02 ENCOUNTER — LAB VISIT (OUTPATIENT)
Dept: LAB | Facility: HOSPITAL | Age: 30
End: 2020-01-02
Attending: SPECIALIST
Payer: MEDICAID

## 2020-01-02 ENCOUNTER — TELEPHONE (OUTPATIENT)
Dept: PHARMACY | Facility: CLINIC | Age: 30
End: 2020-01-02

## 2020-01-02 ENCOUNTER — OFFICE VISIT (OUTPATIENT)
Dept: FAMILY MEDICINE | Facility: CLINIC | Age: 30
End: 2020-01-02
Payer: MEDICAID

## 2020-01-02 VITALS
SYSTOLIC BLOOD PRESSURE: 99 MMHG | WEIGHT: 124.63 LBS | HEIGHT: 64 IN | BODY MASS INDEX: 21.28 KG/M2 | DIASTOLIC BLOOD PRESSURE: 64 MMHG | HEART RATE: 84 BPM | TEMPERATURE: 100 F

## 2020-01-02 DIAGNOSIS — Z11.3 SCREEN FOR STD (SEXUALLY TRANSMITTED DISEASE): ICD-10-CM

## 2020-01-02 DIAGNOSIS — Z11.3 SCREENING EXAMINATION FOR STD (SEXUALLY TRANSMITTED DISEASE): ICD-10-CM

## 2020-01-02 DIAGNOSIS — F19.10 SUBSTANCE ABUSE: ICD-10-CM

## 2020-01-02 DIAGNOSIS — G40.909 SEIZURE DISORDER: ICD-10-CM

## 2020-01-02 DIAGNOSIS — B18.2 HEP C W/O COMA, CHRONIC: Primary | ICD-10-CM

## 2020-01-02 PROCEDURE — 99999 PR PBB SHADOW E&M-EST. PATIENT-LVL IV: CPT | Mod: PBBFAC,,, | Performed by: FAMILY MEDICINE

## 2020-01-02 PROCEDURE — 99999 PR PBB SHADOW E&M-EST. PATIENT-LVL IV: ICD-10-PCS | Mod: PBBFAC,,, | Performed by: FAMILY MEDICINE

## 2020-01-02 PROCEDURE — 86694 HERPES SIMPLEX NES ANTBDY: CPT

## 2020-01-02 PROCEDURE — 86703 HIV-1/HIV-2 1 RESULT ANTBDY: CPT

## 2020-01-02 PROCEDURE — 36415 COLL VENOUS BLD VENIPUNCTURE: CPT | Mod: PO

## 2020-01-02 PROCEDURE — 99214 PR OFFICE/OUTPT VISIT, EST, LEVL IV, 30-39 MIN: ICD-10-PCS | Mod: S$PBB,,, | Performed by: FAMILY MEDICINE

## 2020-01-02 PROCEDURE — 86592 SYPHILIS TEST NON-TREP QUAL: CPT

## 2020-01-02 PROCEDURE — 80074 ACUTE HEPATITIS PANEL: CPT

## 2020-01-02 PROCEDURE — 99214 OFFICE O/P EST MOD 30 MIN: CPT | Mod: PBBFAC,PO | Performed by: FAMILY MEDICINE

## 2020-01-02 PROCEDURE — 86696 HERPES SIMPLEX TYPE 2 TEST: CPT

## 2020-01-02 PROCEDURE — 99214 OFFICE O/P EST MOD 30 MIN: CPT | Mod: S$PBB,,, | Performed by: FAMILY MEDICINE

## 2020-01-02 RX ORDER — VELPATASVIR AND SOFOSBUVIR 100; 400 MG/1; MG/1
1 TABLET, FILM COATED ORAL DAILY
Qty: 28 TABLET | Refills: 2 | Status: SHIPPED | OUTPATIENT
Start: 2020-01-02 | End: 2020-07-23 | Stop reason: ALTCHOICE

## 2020-01-02 RX ORDER — VELPATASVIR AND SOFOSBUVIR 100; 400 MG/1; MG/1
TABLET, FILM COATED ORAL
COMMUNITY
Start: 2019-12-03 | End: 2020-01-06 | Stop reason: SDUPTHER

## 2020-01-02 RX ORDER — LEVOMEFOLATE CALCIUM 15 MG
TABLET ORAL
COMMUNITY
Start: 2019-12-17 | End: 2020-01-02

## 2020-01-02 RX ORDER — DIVALPROEX SODIUM 500 MG/1
1000 TABLET, DELAYED RELEASE ORAL DAILY
COMMUNITY
End: 2020-03-26

## 2020-01-02 RX ORDER — MULTIVITAMIN
TABLET ORAL
Refills: 1 | COMMUNITY
Start: 2019-11-22 | End: 2020-02-27

## 2020-01-02 RX ORDER — DULOXETIN HYDROCHLORIDE 30 MG/1
30 CAPSULE, DELAYED RELEASE ORAL DAILY
COMMUNITY
End: 2020-03-26

## 2020-01-02 RX ORDER — MELOXICAM 15 MG/1
TABLET ORAL
COMMUNITY
Start: 2019-11-21 | End: 2020-03-26

## 2020-01-02 RX ORDER — ACETAMINOPHEN, DIPHENHYDRAMINE HCL, PHENYLEPHRINE HCL 325; 25; 5 MG/1; MG/1; MG/1
TABLET ORAL
COMMUNITY
Start: 2019-11-21 | End: 2021-11-10

## 2020-01-02 RX ORDER — CHOLECALCIFEROL (VITAMIN D3) 125 MCG
CAPSULE ORAL
Refills: 0 | Status: ON HOLD | COMMUNITY
Start: 2019-12-03 | End: 2022-04-10 | Stop reason: HOSPADM

## 2020-01-02 RX ORDER — NALOXONE HYDROCHLORIDE 4 MG/.1ML
SPRAY NASAL
Refills: 0 | COMMUNITY
Start: 2019-09-25 | End: 2021-06-25

## 2020-01-02 RX ORDER — QUETIAPINE FUMARATE 100 MG/1
TABLET, FILM COATED ORAL
Refills: 0 | COMMUNITY
Start: 2019-12-03 | End: 2020-01-08

## 2020-01-02 RX ORDER — LEVOMEFOLATE CALCIUM 15 MG
TABLET ORAL
COMMUNITY
Start: 2019-11-21 | End: 2022-04-10

## 2020-01-02 NOTE — PROGRESS NOTES
The patient presents today sp 3-4 week admit for drug rehab. She was also dx Hep B and had begun antivirals   Past Medical History:  Past Medical History:   Diagnosis Date    Abnormal Pap smear     ALLERGIC RHINITIS     Anemia     Chronic back pain     Drug-seeking behavior 4/16/2012    Epilepsy     GERD (gastroesophageal reflux disease)     Opioid dependence 4/16/2012    Pre-eclampsia     Seizures     last-10/2011, EEG-normal    Urinary tract infection      Past Surgical History:   Procedure Laterality Date    CHOLECYSTECTOMY      mirena removal  02/2018    PELVIC LAPAROSCOPY      iud retrieval     VAGINAL DELIVERY      times 4    vestibulectomy      WISDOM TOOTH EXTRACTION       Review of patient's allergies indicates:   Allergen Reactions    Darvocet a500 [propoxyphene n-acetaminophen]     Phenytoin sodium extended Other (See Comments)     Current Outpatient Medications on File Prior to Visit   Medication Sig Dispense Refill    buprenorphine (SUBLOCADE) 300 mg/1.5 mL injection       butalbital-acetaminophen-caffeine -40 mg (FIORICET, ESGIC) -40 mg per tablet TAKE ONE TABLET BY MOUTH TWICE DAILY 30 tablet 1    butalbital-acetaminophen-caffeine -40 mg (FIORICET, ESGIC) -40 mg per tablet TAKE ONE TABLET BY MOUTH TWICE DAILY 30 tablet 1    cholecalciferol, vitamin D3, 5,000 unit capsule TAKE 1 CAPSULE BY MOUTH ONCE A DAY (6AM)  0    divalproex (DEPAKOTE) 500 MG TbEC Take 1,000 mg by mouth once daily.      DULoxetine (CYMBALTA) 30 MG capsule Take 30 mg by mouth once daily.      gabapentin (NEURONTIN) 800 MG tablet Take 1 tablet (800 mg total) by mouth 3 (three) times daily. 90 tablet 11    ibuprofen (ADVIL,MOTRIN) 800 MG tablet TAKE ONE TABLET BY MOUTH THREE TIMES DAILY 90 tablet 12    levETIRAcetam (KEPPRA) 750 MG Tab Take 2 tablets (1,500 mg total) by mouth 2 (two) times daily. 120 tablet 11    levomefolate calcium (L-METHYLFOLATE) 15 mg Tab       melatonin 10 mg  Tab       meloxicam (MOBIC) 15 MG tablet       multivitamin (THERAGRAN) per tablet TAKE 1 TABLET BY MOUTH ONCE A DAY (6AM)  1    NARCAN 4 mg/actuation Spry USE 1 spray in nostril AS NEEDED  0    PRENATAL VITAMIN 27 mg iron- 0.8 mg Tab TAKE ONE TABLET BY MOUTH EVERY  tablet 3    QUEtiapine (SEROQUEL) 100 MG Tab TAKE 1 TABLET BY MOUTH ONCE A DAY AT BEDTIME (9PM)  0    sofosbuvir-velpatasvir 400-100 mg Tab       citalopram (CELEXA) 40 MG tablet TAKE ONE TABLET BY MOUTH ONCE DAILY 90 tablet 4    diazePAM (VALIUM) 5 MG tablet Take 5 mg by mouth once daily.       ranitidine (ZANTAC) 150 MG tablet Take 1 tablet (150 mg total) by mouth 2 (two) times daily as needed. 60 tablet 11    varenicline (CHANTIX PARTH) 0.5 mg (11)- 1 mg (42) tablet Take one 0.5mg tab by mouth once daily X3 days,then increase to one 0.5mg tab twice daily X4 days,then increase to one 1mg tab twice daily 1 Package 4    [DISCONTINUED] ibuprofen (ADVIL,MOTRIN) 800 MG tablet Take 800 mg by mouth 3 (three) times daily.  12    [DISCONTINUED] ketorolac (TORADOL) 10 mg tablet Take 1 tablet (10 mg total) by mouth every 6 (six) hours. 20 tablet 0    [DISCONTINUED] L-METHYLFOLATE 15 mg Tab       [DISCONTINUED] PHENobarbital (LUMINAL) 97.2 MG tablet Take 97.2 mg by mouth 2 (two) times daily.  1     Current Facility-Administered Medications on File Prior to Visit   Medication Dose Route Frequency Provider Last Rate Last Dose    medroxyPROGESTERone (DEPO-PROVERA) injection 150 mg  150 mg Intramuscular Q90 Days Dung Werner MD        medroxyPROGESTERone (DEPO-PROVERA) injection 150 mg  150 mg Intramuscular Q90 Days Dung Werner MD         Social History     Socioeconomic History    Marital status: Single     Spouse name: Not on file    Number of children: Not on file    Years of education: Not on file    Highest education level: Not on file   Occupational History    Not on file   Social Needs    Financial resource strain: Not on file     Food insecurity:     Worry: Not on file     Inability: Not on file    Transportation needs:     Medical: Not on file     Non-medical: Not on file   Tobacco Use    Smoking status: Current Every Day Smoker     Packs/day: 0.25     Years: 5.00     Pack years: 1.25     Types: Cigarettes    Smokeless tobacco: Never Used   Substance and Sexual Activity    Alcohol use: Yes     Comment: occasional    Drug use: Yes     Types: Hydrocodone    Sexual activity: Not Currently     Birth control/protection: Injection   Lifestyle    Physical activity:     Days per week: Not on file     Minutes per session: Not on file    Stress: Not on file   Relationships    Social connections:     Talks on phone: Not on file     Gets together: Not on file     Attends Jainism service: Not on file     Active member of club or organization: Not on file     Attends meetings of clubs or organizations: Not on file     Relationship status: Not on file   Other Topics Concern    Not on file   Social History Narrative    Not on file     Family History   Problem Relation Age of Onset    Breast cancer Neg Hx     Ovarian cancer Neg Hx          ROS:GENERAL: No fever, chills, fatigability or weight loss.  SKIN: No rashes, itching or changes in color or texture of skin.  HEAD: No headaches or recent head trauma.EYES: Visual acuity fine. No photophobia, ocular pain or diplopia.EARS: Denies ear pain, discharge or vertigo.NOSE: No loss of smell, no epistaxis or postnasal drip.MOUTH & THROAT: No hoarseness or change in voice. No excessive gum bleeding.NODES: Denies swollen glands.  CHEST: Denies MEDINA, cyanosis, wheezing, cough and sputum production.  CARDIOVASCULAR: Denies chest pain, PND, orthopnea or reduced exercise tolerance.  ABDOMEN: Appetite fine. No weight loss. Denies diarrhea, abdominal pain, hematemesis or blood in stool.  URINARY: No flank pain, dysuria or hematuria.  PERIPHERAL VASCULAR: No claudication or cyanosis.  MUSCULOSKELETAL: See  above.  NEUROLOGIC: No history of seizures, paralysis, alteration of gait or coordination.  PE:    HEAD: Normocephalic, atraumatic.EYES: PERRL. EOMI.   EARS: TM's intact. Light reflex normal. No retraction or perforation.   NOSE: Mucosa pink. Airway clear.MOUTH & THROAT: No tonsillar enlargement. No pharyngeal erythema or exudate. No stridor.  NODES: No cervical, axillary or inguinal lymph node enlargement.  CHEST: Lungs clear to auscultation.  CARDIOVASCULAR: Normal S1, S2. No rubs, murmurs or gallops.  ABDOMEN: Bowel sounds normal. Not distended. Soft. No tenderness or masses.  MUSCULOSKELETAL: No palpable abnormality  NEUROLOGIC: Cranial Nerves: II-XII grossly intact.  Motor: 5/5 strength major flexors/extensors.  DTR's: Knees, Ankles 2+ and equal bilaterally; downgoing toes.  Sensory: Intact to light touch distally.  Gait & Posture: Normal gait and fine motion. No cerebellar signs.        Sara was seen today for f/u appt from rehab discharge.    Diagnoses and all orders for this visit:    Seizure disorder    Hep C w/o coma, chronic    Other orders  -     sofosbuvir-velpatasvir 400-100 mg Tab; Take 1 tablet by mouth every other day.    Psych fu  Addiction med fu  Neuro fu

## 2020-01-03 ENCOUNTER — TELEPHONE (OUTPATIENT)
Dept: NEUROLOGY | Facility: CLINIC | Age: 30
End: 2020-01-03

## 2020-01-03 ENCOUNTER — PATIENT MESSAGE (OUTPATIENT)
Dept: OBSTETRICS AND GYNECOLOGY | Facility: CLINIC | Age: 30
End: 2020-01-03

## 2020-01-03 ENCOUNTER — PATIENT MESSAGE (OUTPATIENT)
Dept: FAMILY MEDICINE | Facility: CLINIC | Age: 30
End: 2020-01-03

## 2020-01-03 LAB
HAV IGM SERPL QL IA: NEGATIVE
HAV IGM SERPL QL IA: NEGATIVE
HBV CORE IGM SERPL QL IA: NEGATIVE
HBV CORE IGM SERPL QL IA: NEGATIVE
HBV SURFACE AG SERPL QL IA: NEGATIVE
HBV SURFACE AG SERPL QL IA: NEGATIVE
HCV AB SERPL QL IA: POSITIVE
HCV AB SERPL QL IA: POSITIVE
HIV 1+2 AB+HIV1 P24 AG SERPL QL IA: NEGATIVE
HIV 1+2 AB+HIV1 P24 AG SERPL QL IA: NEGATIVE
HSV1 IGG SERPL QL IA: NEGATIVE
HSV2 IGG SERPL QL IA: NEGATIVE
RPR SER QL: NORMAL
RPR SER QL: NORMAL

## 2020-01-03 NOTE — TELEPHONE ENCOUNTER
----- Message from Avelina Beaulieu LPN sent at 1/3/2020  8:42 AM CST -----  Good morning, I am trying to schedule the above mentioned patient for a consult appt with Dr Guerrero.  I am not having any luck scheduling, would you please help? There is a referral from 8/2019, that is ready for scheduling, thanks

## 2020-01-03 NOTE — TELEPHONE ENCOUNTER
----- Message from Avelina Beaulieu LPN sent at 1/3/2020 10:19 AM CST -----  Ok so the patient is ok with waiting to see Dr Guerrero in April 2020, would you please schedule something for her then, thank you

## 2020-01-06 ENCOUNTER — TELEPHONE (OUTPATIENT)
Dept: PHARMACY | Facility: CLINIC | Age: 30
End: 2020-01-06

## 2020-01-06 NOTE — TELEPHONE ENCOUNTER
DOCUMENTATION ONLY:  [employer] does not required prior authorization with insurance company.     Co-pay: $0    Patient Assistance IS NOT required.     Forward to clinical pharmacist for consult & shipment.

## 2020-01-06 NOTE — TELEPHONE ENCOUNTER
Initial Medication Consultation: AG Epclusa    Initial Epclusa consult completed on . Epclusa will be shipped on  to arrive at patient's home on  via [employer]. $0.00 copay. Patient will start Epclusa on . Address confirmed. Confirmed 2 patient identifiers - name and . Therapy Appropriate.     Epclusa 400/100mg- Take one tablet by mouth daily x 12 weeks  Counseling was reviewed:   1. Patient MUST take Epclusa at the SAME time every day.   2. Patient MUST avoid acid reducers without consulting with myself or provider first. Antacids are to be spaced out at least 4 hours apart from Epclusa.     3. Potential Side effects include: headaches and fatigue.   Headache: Patient may treat with OTC remedies. If Tylenol is used, dose should not exceed 2000mg per day.    4. Medication list reviewed. No DDIs or allergies noted. Patient MUST contact myself or provider prior to starting any new OTC, herbal, or prescription drugs to avoid potential DDIs.    DDI: Medication list reviewed and potential DDIs addressed.    Discussed the importance of staying well hydrated while on therapy. Compliance stressed - patient to take missed doses as soon as remembered, but NOT to take 2 doses in one day. Patient will report questions or concerns to myself or practitioner. Patient verbalizes understanding. Patient plans to start Epclusa on .  Consultation included: indication; goals of treatment; administration; storage and handling; side effects; how to handle side effects; the importance of compliance; how to handle missed doses; the importance of laboratory monitoring; the importance of keeping all follow up appointments.  Patient understands to report any medication changes to [facility] and provider. All questions answered and addressed to patients satisfaction. I will f/u with her in 1 week from start, [facility] to contact patient in 3 weeks for refills.     AG Epclusa dispensed due to Medicaid formulary. Any reference to Epclusa in  above note is for SCAR Bashir PharmD, [facility]  Clinical Pharmacist   [facility]  Phone: [phone]

## 2020-01-07 ENCOUNTER — PATIENT MESSAGE (OUTPATIENT)
Dept: FAMILY MEDICINE | Facility: CLINIC | Age: 30
End: 2020-01-07

## 2020-01-07 ENCOUNTER — OFFICE VISIT (OUTPATIENT)
Dept: OBSTETRICS AND GYNECOLOGY | Facility: CLINIC | Age: 30
End: 2020-01-07
Payer: MEDICAID

## 2020-01-07 VITALS
WEIGHT: 120.81 LBS | SYSTOLIC BLOOD PRESSURE: 102 MMHG | DIASTOLIC BLOOD PRESSURE: 62 MMHG | BODY MASS INDEX: 20.74 KG/M2

## 2020-01-07 DIAGNOSIS — N91.2 AMENORRHEA: ICD-10-CM

## 2020-01-07 DIAGNOSIS — Z11.3 SCREEN FOR STD (SEXUALLY TRANSMITTED DISEASE): ICD-10-CM

## 2020-01-07 DIAGNOSIS — Z12.4 ENCOUNTER FOR PAP SMEAR OF CERVIX WITH HPV DNA COTESTING: ICD-10-CM

## 2020-01-07 DIAGNOSIS — N92.0 MENORRHAGIA WITH REGULAR CYCLE: ICD-10-CM

## 2020-01-07 DIAGNOSIS — N91.2 ABSENT MENSES: Primary | ICD-10-CM

## 2020-01-07 LAB
B-HCG UR QL: NEGATIVE
CTP QC/QA: YES

## 2020-01-07 PROCEDURE — 99213 OFFICE O/P EST LOW 20 MIN: CPT | Mod: S$PBB,,, | Performed by: SPECIALIST

## 2020-01-07 PROCEDURE — 87624 HPV HI-RISK TYP POOLED RSLT: CPT

## 2020-01-07 PROCEDURE — 87491 CHLMYD TRACH DNA AMP PROBE: CPT

## 2020-01-07 PROCEDURE — 99214 OFFICE O/P EST MOD 30 MIN: CPT | Mod: PBBFAC,PN | Performed by: SPECIALIST

## 2020-01-07 PROCEDURE — 99213 PR OFFICE/OUTPT VISIT, EST, LEVL III, 20-29 MIN: ICD-10-PCS | Mod: S$PBB,,, | Performed by: SPECIALIST

## 2020-01-07 PROCEDURE — 81025 URINE PREGNANCY TEST: CPT | Mod: PBBFAC,PN | Performed by: SPECIALIST

## 2020-01-07 PROCEDURE — 99999 PR PBB SHADOW E&M-EST. PATIENT-LVL IV: ICD-10-PCS | Mod: PBBFAC,,, | Performed by: SPECIALIST

## 2020-01-07 PROCEDURE — 88175 CYTOPATH C/V AUTO FLUID REDO: CPT

## 2020-01-07 PROCEDURE — 99999 PR PBB SHADOW E&M-EST. PATIENT-LVL IV: CPT | Mod: PBBFAC,,, | Performed by: SPECIALIST

## 2020-01-07 NOTE — PROGRESS NOTES
"30 yo WF  presents for desired fertility. Pt states was on DPMA but discontinued . States attempting to concieve. Penses consists of "spotting" monthly. Denies PP, dysuria, discharge.  Past Medical History:   Diagnosis Date    Abnormal Pap smear     ALLERGIC RHINITIS     Anemia     Chronic back pain     Drug-seeking behavior 2012    Epilepsy     GERD (gastroesophageal reflux disease)     Hepatitis C     Opioid dependence 2012    Pre-eclampsia     Seizures     last-10/2011, EEG-normal    Urinary tract infection        Past Surgical History:   Procedure Laterality Date    CHOLECYSTECTOMY      mirena removal  2018    PELVIC LAPAROSCOPY      iud retrieval     VAGINAL DELIVERY      times 4    vestibulectomy      WISDOM TOOTH EXTRACTION         Family History   Problem Relation Age of Onset    Breast cancer Neg Hx     Ovarian cancer Neg Hx        Social History     Socioeconomic History    Marital status: Single     Spouse name: Not on file    Number of children: Not on file    Years of education: Not on file    Highest education level: Not on file   Occupational History    Not on file   Social Needs    Financial resource strain: Not on file    Food insecurity:     Worry: Not on file     Inability: Not on file    Transportation needs:     Medical: Not on file     Non-medical: Not on file   Tobacco Use    Smoking status: Current Every Day Smoker     Packs/day: 0.25     Years: 5.00     Pack years: 1.25     Types: Cigarettes    Smokeless tobacco: Never Used   Substance and Sexual Activity    Alcohol use: Yes     Comment: occasional    Drug use: Yes     Types: Hydrocodone    Sexual activity: Not Currently     Birth control/protection: Injection   Lifestyle    Physical activity:     Days per week: Not on file     Minutes per session: Not on file    Stress: Not on file   Relationships    Social connections:     Talks on phone: Not on file     Gets together: Not on file    "  Attends [employer] witness service: Not on file     Active member of club or organization: Not on file     Attends meetings of clubs or organizations: Not on file     Relationship status: Not on file   Other Topics Concern    Not on file   Social History Narrative    Not on file       Current Outpatient Medications   Medication Sig Dispense Refill    buprenorphine (SUBLOCADE) 300 mg/1.5 mL injection       butalbital-acetaminophen-caffeine -40 mg (FIORICET, ESGIC) -40 mg per tablet TAKE ONE TABLET BY MOUTH TWICE DAILY 30 tablet 1    butalbital-acetaminophen-caffeine -40 mg (FIORICET, ESGIC) -40 mg per tablet TAKE ONE TABLET BY MOUTH TWICE DAILY 30 tablet 1    cholecalciferol, vitamin D3, 5,000 unit capsule TAKE 1 CAPSULE BY MOUTH ONCE A DAY (6AM)  0    citalopram (CELEXA) 40 MG tablet TAKE ONE TABLET BY MOUTH ONCE DAILY 90 tablet 4    divalproex (DEPAKOTE) 500 MG TbEC Take 1,000 mg by mouth once daily.      DULoxetine (CYMBALTA) 30 MG capsule Take 30 mg by mouth once daily.      gabapentin (NEURONTIN) 800 MG tablet Take 1 tablet (800 mg total) by mouth 3 (three) times daily. 90 tablet 11    ibuprofen (ADVIL,MOTRIN) 800 MG tablet TAKE ONE TABLET BY MOUTH THREE TIMES DAILY 90 tablet 12    levETIRAcetam (KEPPRA) 750 MG Tab Take 2 tablets (1,500 mg total) by mouth 2 (two) times daily. 120 tablet 11    levomefolate calcium (L-METHYLFOLATE) 15 mg Tab       melatonin 10 mg Tab       meloxicam (MOBIC) 15 MG tablet       multivitamin (THERAGRAN) per tablet TAKE 1 TABLET BY MOUTH ONCE A DAY (6AM)  1    NARCAN 4 mg/actuation Spry USE 1 spray in nostril AS NEEDED  0    PRENATAL VITAMIN 27 mg iron- 0.8 mg Tab TAKE ONE TABLET BY MOUTH EVERY  tablet 3    QUEtiapine (SEROQUEL) 100 MG Tab TAKE 1 TABLET BY MOUTH ONCE A DAY AT BEDTIME (9PM)  0    sofosbuvir-velpatasvir 400-100 mg Tab Take 1 tablet by mouth once daily. 28 tablet 2     Current Facility-Administered Medications   Medication Dose  Route Frequency Provider Last Rate Last Dose    medroxyPROGESTERone (DEPO-PROVERA) injection 150 mg  150 mg Intramuscular Q90 Days Dung Werner MD        medroxyPROGESTERone (DEPO-PROVERA) injection 150 mg  150 mg Intramuscular Q90 Days Dung Werner MD           Review of patient's allergies indicates:   Allergen Reactions    Darvocet a500 [propoxyphene n-acetaminophen]     Phenytoin sodium extended Other (See Comments)       Review of System:   General: no chills, fever, night sweats, weight gain or weight loss  Psychological: no depression or suicidal ideation  Breasts: no new or changing breast lumps, nipple discharge or masses.  Respiratory: no cough, shortness of breath, or wheezing  Cardiovascular: no chest pain or dyspnea on exertion  Gastrointestinal: no abdominal pain, change in bowel habits, or black or bloody stools  Genito-Urinary: no incontinence, urinary frequency/urgency or vulvar/vaginal symptoms, pelvic pain or abnormal vaginal bleeding.  Musculoskeletal: no gait disturbance or muscular weakness                                              General Appearance    A and O x 4, Cooperative, no distress   Breasts    Abdomen   Deferred  Soft, non-tender, bowel sounds active all four quadrants,  no masses, no organomegaly    Genitourinary:   External rectal exam shows no thrombosed external hemorrhoids.   Pelvic exam was performed with patient supine.  No labial fusion.  There is no rash, lesion or injury on the right labia.  There is no rash, lesion or injury on the left labia.  No bleeding and no signs of injury around the vaginal introitus, urethra is without lesions and well supported. The cervix is visualized with no discharge, lesions or friability.  No vaginal discharge found.  No significant Cystocele, Enterocele or rectocele, and uterus well supported.  Bimanual exam:  The urethra is normal to palpation and there are no palpable vaginal wall masses.  Uterus is not deviated, not enlarged, not  fixed, normal shape and not tender.  Cervix exhibits no motion tenderness.   Right adnexum displays no mass and no tenderness.  Left adnexum displays no mass and no tenderness.   Extremities: Extremities normal, atraumatic, no cyanosis or edema                     PAP  GC CZ submitted    I had a 20 min discussion regarding secondary infertility and etiologies  Will begin with ovulation determination FSH and u/s  Pt to contact me with nexyt menses  Will follow

## 2020-01-08 ENCOUNTER — PATIENT MESSAGE (OUTPATIENT)
Dept: FAMILY MEDICINE | Facility: CLINIC | Age: 30
End: 2020-01-08

## 2020-01-08 ENCOUNTER — PATIENT MESSAGE (OUTPATIENT)
Dept: OBSTETRICS AND GYNECOLOGY | Facility: CLINIC | Age: 30
End: 2020-01-08

## 2020-01-08 DIAGNOSIS — B18.2 HEP C W/O COMA, CHRONIC: Primary | ICD-10-CM

## 2020-01-08 LAB — HSV AB, IGM BY EIA: 1.23 INDEX

## 2020-01-08 RX ORDER — QUETIAPINE FUMARATE 200 MG/1
200 TABLET, FILM COATED ORAL DAILY
Qty: 10 TABLET | Refills: 0 | Status: SHIPPED | OUTPATIENT
Start: 2020-01-08 | End: 2021-12-16 | Stop reason: SDUPTHER

## 2020-01-09 LAB
C TRACH DNA SPEC QL NAA+PROBE: NOT DETECTED
N GONORRHOEA DNA SPEC QL NAA+PROBE: NOT DETECTED

## 2020-01-10 LAB
HPV HR 12 DNA SPEC QL NAA+PROBE: NEGATIVE
HPV16 AG SPEC QL: NEGATIVE
HPV18 DNA SPEC QL NAA+PROBE: NEGATIVE

## 2020-01-14 ENCOUNTER — TELEPHONE (OUTPATIENT)
Dept: RADIOLOGY | Facility: HOSPITAL | Age: 30
End: 2020-01-14

## 2020-01-14 ENCOUNTER — TELEPHONE (OUTPATIENT)
Dept: PHARMACY | Facility: CLINIC | Age: 30
End: 2020-01-14

## 2020-01-14 RX ORDER — BUTALBITAL, ACETAMINOPHEN AND CAFFEINE 50; 325; 40 MG/1; MG/1; MG/1
TABLET ORAL
Qty: 30 TABLET | Refills: 1 | Status: SHIPPED | OUTPATIENT
Start: 2020-01-14 | End: 2020-02-26

## 2020-01-14 NOTE — TELEPHONE ENCOUNTER
Epclusa 7-day touch base attempted. No answer. LVM for call back. Will f/u with patient at refill if no call back.

## 2020-01-22 ENCOUNTER — PATIENT MESSAGE (OUTPATIENT)
Dept: FAMILY MEDICINE | Facility: CLINIC | Age: 30
End: 2020-01-22

## 2020-01-22 ENCOUNTER — PATIENT MESSAGE (OUTPATIENT)
Dept: OBSTETRICS AND GYNECOLOGY | Facility: CLINIC | Age: 30
End: 2020-01-22

## 2020-01-22 DIAGNOSIS — F11.20 UNCOMPLICATED OPIOID DEPENDENCE: Primary | ICD-10-CM

## 2020-01-22 NOTE — TELEPHONE ENCOUNTER
Pend me the order with one refill.  Please ask the patient to come in over the next few months so we can establish care

## 2020-01-22 NOTE — TELEPHONE ENCOUNTER
Please see patient's original message request a medication that helps with symptoms from being off opiods, Please advise.

## 2020-01-22 NOTE — TELEPHONE ENCOUNTER
Please find out more information on this.  I was just refilling the medication until she can get in to establish care.  Who was the initial prescribing doctor?  She should probably contact them to see if there is alternative.

## 2020-01-23 LAB
FINAL PATHOLOGIC DIAGNOSIS: NORMAL
Lab: NORMAL

## 2020-01-23 RX ORDER — BUTALBITAL, ACETAMINOPHEN AND CAFFEINE 50; 325; 40 MG/1; MG/1; MG/1
1 TABLET ORAL 2 TIMES DAILY
Qty: 30 TABLET | Refills: 1 | OUTPATIENT
Start: 2020-01-23

## 2020-01-23 NOTE — TELEPHONE ENCOUNTER
Medication refused due to failing protocol.    Requested Prescriptions   Pending Prescriptions Disp Refills    butalbital-acetaminophen-caffeine -40 mg (FIORICET, ESGIC) -40 mg per tablet 30 tablet 1     Sig: Take 1 tablet by mouth 2 (two) times daily.       Narcotics Refill Protocol Failed - 1/23/2020  7:21 AM        Failed - Med not refilled within 4 weeks        Passed - Patient not pregnant        Passed - Patient seen within 3 months     Last visit with Paul Larson MD: Visit date not found  Last visit in [facility]: 1/2/2020    Patient's next visit in [facility]: 1/27/2020

## 2020-01-26 ENCOUNTER — PATIENT MESSAGE (OUTPATIENT)
Dept: FAMILY MEDICINE | Facility: CLINIC | Age: 30
End: 2020-01-26

## 2020-01-28 ENCOUNTER — TELEPHONE (OUTPATIENT)
Dept: PHARMACY | Facility: CLINIC | Age: 30
End: 2020-01-28

## 2020-01-28 NOTE — TELEPHONE ENCOUNTER
"Epclusa (2 of 3) refill and reassessment attempted. (Attempt 1). N/A. Unable to LVM for call back - "Mailbox Full". Will f/u with patient if no return call. Sent pt [facility] message to call [facility].   "

## 2020-01-29 ENCOUNTER — PATIENT MESSAGE (OUTPATIENT)
Dept: OBSTETRICS AND GYNECOLOGY | Facility: CLINIC | Age: 30
End: 2020-01-29

## 2020-01-29 DIAGNOSIS — N91.2 AMENORRHEA: Primary | ICD-10-CM

## 2020-01-30 NOTE — TELEPHONE ENCOUNTER
Epclusa refill (2 of 3) confirmed and reassessment complete. We will ship Epclusa refill on 2/3 via fedex to arrive on . $0.00 copay- 004. Confirmed 2 patient identifiers - name and . Therapy appropriate.     Patient has 8 doses of Epclusa remaining and takes it around 12 noon daily.  Pt reports they are not having any side effects so far. Pt reports missing 4 doses due to sleeping through the day. Counseled the patient on the importance of medication adherence. Advised pt to set an alarm on her phone that goes off daily to remind her to take her medication. No new medications, no new allergies or health conditions reported at this time. Allergies reviewed and medication reconciliation complete (reviewed and documented in [facility] and [facility]).  Disease education reviewed (including transmission and prevention). Patient counseled on importance of maintaining adherence and keeping lab appointments which were scheduled. All questions answered and addressed to patients satisfaction. Advised to call [facility] and provider if any issues arise.  Pt verbalized understanding.

## 2020-02-06 ENCOUNTER — PATIENT MESSAGE (OUTPATIENT)
Dept: OBSTETRICS AND GYNECOLOGY | Facility: CLINIC | Age: 30
End: 2020-02-06

## 2020-02-10 ENCOUNTER — PATIENT MESSAGE (OUTPATIENT)
Dept: FAMILY MEDICINE | Facility: CLINIC | Age: 30
End: 2020-02-10

## 2020-02-17 ENCOUNTER — PATIENT MESSAGE (OUTPATIENT)
Dept: FAMILY MEDICINE | Facility: CLINIC | Age: 30
End: 2020-02-17

## 2020-02-26 ENCOUNTER — PATIENT MESSAGE (OUTPATIENT)
Dept: FAMILY MEDICINE | Facility: CLINIC | Age: 30
End: 2020-02-26

## 2020-02-26 RX ORDER — BUTALBITAL, ACETAMINOPHEN AND CAFFEINE 50; 325; 40 MG/1; MG/1; MG/1
TABLET ORAL
Qty: 30 TABLET | Refills: 1 | Status: SHIPPED | OUTPATIENT
Start: 2020-02-26 | End: 2020-02-26

## 2020-02-26 RX ORDER — BUTALBITAL, ACETAMINOPHEN AND CAFFEINE 50; 325; 40 MG/1; MG/1; MG/1
1 TABLET ORAL 2 TIMES DAILY
Qty: 30 TABLET | Refills: 1 | Status: SHIPPED | OUTPATIENT
Start: 2020-02-26 | End: 2020-03-03

## 2020-02-26 NOTE — TELEPHONE ENCOUNTER
Attempted to call this patient, received a message that mailbox was full and unable to leave a message.

## 2020-02-26 NOTE — TELEPHONE ENCOUNTER
Patient requesting a refill on the pended medication,  Patient has not established care with Dr. Larson.  Patient is a former Dr. Werner patient last seen by Dr. Werner on 01/02/2020.  Medication pended, Please advise.

## 2020-02-27 ENCOUNTER — PATIENT MESSAGE (OUTPATIENT)
Dept: OBSTETRICS AND GYNECOLOGY | Facility: CLINIC | Age: 30
End: 2020-02-27

## 2020-02-27 ENCOUNTER — TELEPHONE (OUTPATIENT)
Dept: PHARMACY | Facility: CLINIC | Age: 30
End: 2020-02-27

## 2020-02-27 DIAGNOSIS — Z32.00 POSSIBLE PREGNANCY: Primary | ICD-10-CM

## 2020-02-27 NOTE — TELEPHONE ENCOUNTER
Epclusa refill (3 of 3) confirmed and reassessment complete. We will ship Epclusa refill on 3/2 via fedex to arrive on 3/3. $0.00 copay- 004. Confirmed 2 patient identifiers - name and . Therapy appropriate.     Patient has 7 doses of Epclsua remaining and takes it around 12pm daily.  Pt reports they are not having any side effects so far. No missed doses, no new medications, no new allergies or health conditions reported at this time. Allergies reviewed and medication reconciliation complete (reviewed and documented in [facility] and [facility]).  Disease education reviewed (including transmission and prevention). Patient counseled on importance of maintaining adherence and keeping lab appointments which were scheduled. All questions answered and addressed to patients satisfaction. Advised to call [facility] and provider if any issues arise.  Pt verbalized understanding.

## 2020-02-27 NOTE — TELEPHONE ENCOUNTER
Message was sent 2/26, to Dr Werner and patient was informed by Ct that she needs appointment to have med changed.  Patient wrote this: I need to see if I can please have my sleep medicine changed, I used to take Ambien 10mg and it helped me sleep all night without waking up. This detox place I was at put me on seroquel 200mg and I'm restless leg syndrome, I'm way too drowsy all the time since been on it. I switched bc it's an antipsychotic also was to treat my depression and anxiety but I can't stay on it. Can I please have the ambien called in?     Patient has appt next week, please advise

## 2020-03-02 NOTE — PROGRESS NOTES
======================================================  GOAL of current visit: Est Care/Refill Medications    Previous PCP: Dr. Dung Werner  Specialists: GYN Dr. Kristian HENDRICKSON  Recent lab work: January 2020  Recent imaging: January 2020  Colonoscopy History: N/A    Health Maintenance Due   Topic Date Due    Pneumococcal Vaccine (Medium Risk) (1 of 1 - PPSV23) 12/21/2008     ======================================================  PLAN:      Of note patient recently message her OBGYN for possible pregnancy.  Will avoid steroids and other medications at this time until further testing returns.  Patient deferred blood work that was ordered during this visit as she had blood drawn prior to visit.    Problem List Items Addressed This Visit     Opioid dependence with opioid-induced disorder (Chronic)     The patient was checked in the [facility] Board of Pharmacy's  Prescription Monitoring Program. There appears to be significant incongruities with the patient's verbalized history.     Patient is requesting refills on multiple high risk medication and to be started on Adderall.  Patient was advised that this is not best practice.    Patient does have follow-up with Neurology soon for her seizure disorder.           Relevant Orders    CBC Without Differential    Ambulatory referral/consult to Primary Care Behavioral Health (Opioids)    Encounter for long-term (current) use of medications - Primary (Chronic)     Complete history and physical was completed today.  Complete and thorough medication reconciliation was performed.  Discussed risks and benefits of medications.  Advised patient on orders and health maintenance.  We discussed old records and old labs if available.  Will request any records not available through epic.  Continue current medications listed on your summary sheet.           Relevant Orders    CBC Without Differential    TSH    Comprehensive metabolic panel    Hemoglobin A1c    Lipid panel     Ambulatory referral/consult to Primary Care Behavioral Health (Opioids)    Chronic hepatitis C without hepatic coma (Chronic)     Check viral load.  Patient strongly advised to adhere to treatment to obtain SVR.  Reorder abdominal ultrasound that was not performed due to cancellation.    Patient noted to be noncompliant with medication.    Continue Epclusa daily.  Recheck liver function and other labs as well.    ER precautions         Relevant Orders    CBC Without Differential    Comprehensive metabolic panel    US Abdomen Complete    HCV MONITOR, TOO AMPLICOR    Personal history of high risk medication treatment (Chronic)    Relevant Orders    CBC Without Differential    Ambulatory referral/consult to Primary Care Behavioral Health (Opioids)    Encounter for lipid screening for cardiovascular disease    Relevant Orders    Lipid panel    Cough     Likely due to worsening allergic rhinitis with exacerbation from smoking and possibly bronchitis.  With bloody sputum will have chest x-ray ordered.  However patient deferred test.  Vital signs are stable today.  No fever or chest pain.      Discussed condition course and signs and symptoms to expect.  Patient advised take anti-inflammatories and or Tylenol for pain or fever.  ER precautions.  Call MD or follow-up to clinic if not improving or worsening symptoms.           Relevant Medications    montelukast (SINGULAIR) 10 mg tablet    Other Relevant Orders    CBC Without Differential    Comprehensive metabolic panel    X-Ray Chest PA And Lateral        Future Appointments     Date Provider Specialty Appt Notes    3/3/2020  Lab     3/17/2020  Radiology     3/31/2020 Paul Larson MD Family Medicine 4 week lab f/u    4/28/2020 Ronnie Guerrero MD Neurology Chronic intractable headache, unspecified headache type           Medication List with Changes/Refills   New Medications    MONTELUKAST (SINGULAIR) 10 MG TABLET    Take 1 tablet (10 mg total) by mouth every evening.    Current Medications    BUPRENORPHINE (SUBLOCADE) 300 MG/1.5 ML INJECTION        CHOLECALCIFEROL, VITAMIN D3, 5,000 UNIT CAPSULE    TAKE 1 CAPSULE BY MOUTH ONCE A DAY (6AM)    DIVALPROEX (DEPAKOTE) 500 MG TBEC    Take 1,000 mg by mouth once daily.    DULOXETINE (CYMBALTA) 30 MG CAPSULE    Take 30 mg by mouth once daily.    GABAPENTIN (NEURONTIN) 800 MG TABLET    Take 1 tablet (800 mg total) by mouth 3 (three) times daily.    LEVETIRACETAM (KEPPRA) 750 MG TAB    Take 2 tablets (1,500 mg total) by mouth 2 (two) times daily.    LEVOMEFOLATE CALCIUM (L-METHYLFOLATE) 15 MG TAB        MELATONIN 10 MG TAB        MELOXICAM (MOBIC) 15 MG TABLET        NARCAN 4 MG/ACTUATION SPRY    USE 1 spray in nostril AS NEEDED    PRENATAL VIT-IRON FUM-FOLIC AC (PRENATAL VITAMIN) 27 MG IRON- 0.8 MG TAB    Take 1 tablet by mouth once daily.    QUETIAPINE (SEROQUEL) 200 MG TAB    Take 1 tablet (200 mg total) by mouth once daily.    SOFOSBUVIR-VELPATASVIR 400-100 MG TAB    Take 1 tablet by mouth once daily.   Discontinued Medications    BUTALBITAL-ACETAMINOPHEN-CAFFEINE -40 MG (FIORICET, ESGIC) -40 MG PER TABLET    Take 1 tablet by mouth 2 (two) times daily.    IBUPROFEN (ADVIL,MOTRIN) 800 MG TABLET    TAKE ONE TABLET BY MOUTH THREE TIMES DAILY    LOFEXIDINE (LUCEMYRA) 0.18 MG TAB    Take 0.54 mg by mouth 4 (four) times daily.       Paul Larson M.D.     ==========================================================================  Subjective:      Patient ID: Sara Sanford is a 29 y.o. female.  has a past medical history of Abnormal Pap smear, ALLERGIC RHINITIS, Anemia, Chronic back pain, Drug-seeking behavior (4/16/2012), Epilepsy, GERD (gastroesophageal reflux disease), Hepatitis C, Opioid dependence (4/16/2012), Pre-eclampsia, Seizures, and Urinary tract infection.     Chief Complaint: Establish Care    Patient here today to establish care and refill medications.  Patient needs PCV 23 and influenza vaccine to satisfy  health maintenance.  Problem List Items Addressed This Visit     Opioid dependence with opioid-induced disorder (Chronic)    Overview     Chronic.  Control is uncertain.  Requesting records from Pain Management, Neurology, Addiction Medicine.  Patient advised that she has high scores on .   reviewed.  Patient will be referred to primary care Behavioral Health for high risk medication history and dependence.         Current Assessment & Plan     The patient was checked in the [facility] Board of Pharmacy's  Prescription Monitoring Program. There appears to be significant incongruities with the patient's verbalized history.     Patient is requesting refills on multiple high risk medication and to be started on Adderall.  Patient was advised that this is not best practice.    Patient does have follow-up with Neurology soon for her seizure disorder.           Encounter for long-term (current) use of medications - Primary (Chronic)    Overview     CHRONIC. Stable. Compliant with medications for managed conditions. See medication list. No SE reported.   Routine lab analysis is being monitored. Refills were addressed.  Lab Results   Component Value Date    WBC 5.21 01/17/2018    HGB 13.0 01/17/2018    HCT 38.0 01/17/2018    MCV 96 01/17/2018     01/17/2018         Chemistry        Component Value Date/Time     04/09/2019 0903    K 4.5 04/09/2019 0903    CL 99 04/09/2019 0903    CO2 31 (H) 04/09/2019 0903    BUN 11 04/09/2019 0903    CREATININE 0.7 04/09/2019 0903    GLU 94 04/09/2019 0903        Component Value Date/Time    CALCIUM 9.4 04/09/2019 0903    ALKPHOS 113 04/09/2019 0903    AST 32 04/09/2019 0903    ALT 32 04/09/2019 0903    BILITOT 0.3 04/09/2019 0903    ESTGFRAFRICA >60.0 04/09/2019 0903    EGFRNONAA >60.0 04/09/2019 0903          Lab Results   Component Value Date    TSH 1.549 07/06/2017    FREET4 1.31 03/04/2010              Current Assessment & Plan     Complete history and physical  was completed today.  Complete and thorough medication reconciliation was performed.  Discussed risks and benefits of medications.  Advised patient on orders and health maintenance.  We discussed old records and old labs if available.  Will request any records not available through epic.  Continue current medications listed on your summary sheet.           Chronic hepatitis C without hepatic coma (Chronic)    Overview     Chronic.  Control is uncertain.  Patient reports having viral load tested outside lab.  Requesting records.  Patient currently on Epclusa reports intermittent compliance with treatment.  Checking level.  Patient has not had ultrasound that was ordered previously performed.         Current Assessment & Plan     Check viral load.  Patient strongly advised to adhere to treatment to obtain SVR.  Reorder abdominal ultrasound that was not performed due to cancellation.    Patient noted to be noncompliant with medication.    Continue Epclusa daily.  Recheck liver function and other labs as well.    ER precautions         Personal history of high risk medication treatment (Chronic)    Overview     See .  Requesting records from Addiction Medicine and Neurology.  Patient reports that she was recently discharged out of previous Neurology Clinic for no-shows.         Encounter for lipid screening for cardiovascular disease    Overview     Check fasting lipids.    Lab Results   Component Value Date    CHOL 126 07/06/2017    CHOL 107 (L) 04/14/2016     Lab Results   Component Value Date    HDL 36 (L) 07/06/2017    HDL 42 04/14/2016     Lab Results   Component Value Date    LDLCALC 47.2 (L) 07/06/2017    LDLCALC 43.8 (L) 04/14/2016     Lab Results   Component Value Date    TRIG 214 (H) 07/06/2017    TRIG 106 04/14/2016     Lab Results   Component Value Date    CHOLHDL 28.6 07/06/2017    CHOLHDL 39.3 04/14/2016              Cough    Overview       Answers for HPI/ROS submitted by the patient on 3/3/2020    Cough  Chronicity: new  Onset: in the past 7 days  Progression since onset: waxing and waning  Frequency: every few hours  Cough characteristics: productive of blood-tinged sputum, productive of purulent sputum  ear congestion: No  heartburn: No  hemoptysis: Yes  nasal congestion: No  sweats: Yes  weight loss: Yes  Aggravated by: animals, cold air, dust, exercise, fumes, lying down, pollens, stress, other  Risk factors for lung disease: animal exposure, smoking/tobacco exposure  asthma: No  bronchiectasis: No  bronchitis: No  COPD: No  emphysema: No  pneumonia: No  Treatments tried: a beta-agonist inhaler  Improvement on treatment: mild           Current Assessment & Plan     Likely due to worsening allergic rhinitis with exacerbation from smoking and possibly bronchitis.  With bloody sputum will have chest x-ray ordered.  However patient deferred test.  Vital signs are stable today.  No fever or chest pain.      Discussed condition course and signs and symptoms to expect.  Patient advised take anti-inflammatories and or Tylenol for pain or fever.  ER precautions.  Call MD or follow-up to clinic if not improving or worsening symptoms.                  Past Medical History:  Past Medical History:   Diagnosis Date    Abnormal Pap smear     ALLERGIC RHINITIS     Anemia     Chronic back pain     Drug-seeking behavior 4/16/2012    Epilepsy     GERD (gastroesophageal reflux disease)     Hepatitis C     Opioid dependence 4/16/2012    Pre-eclampsia     Seizures     last-10/2011, EEG-normal    Urinary tract infection      Past Surgical History:   Procedure Laterality Date    CHOLECYSTECTOMY      mirena removal  02/2018    PELVIC LAPAROSCOPY      iud retrieval     VAGINAL DELIVERY      times 4    vestibulectomy      WISDOM TOOTH EXTRACTION       Review of patient's allergies indicates:   Allergen Reactions    Darvocet a500 [propoxyphene n-acetaminophen]     Phenytoin sodium extended Other (See Comments)      Medication List with Changes/Refills   New Medications    MONTELUKAST (SINGULAIR) 10 MG TABLET    Take 1 tablet (10 mg total) by mouth every evening.   Current Medications    BUPRENORPHINE (SUBLOCADE) 300 MG/1.5 ML INJECTION        CHOLECALCIFEROL, VITAMIN D3, 5,000 UNIT CAPSULE    TAKE 1 CAPSULE BY MOUTH ONCE A DAY (6AM)    DIVALPROEX (DEPAKOTE) 500 MG TBEC    Take 1,000 mg by mouth once daily.    DULOXETINE (CYMBALTA) 30 MG CAPSULE    Take 30 mg by mouth once daily.    GABAPENTIN (NEURONTIN) 800 MG TABLET    Take 1 tablet (800 mg total) by mouth 3 (three) times daily.    LEVETIRACETAM (KEPPRA) 750 MG TAB    Take 2 tablets (1,500 mg total) by mouth 2 (two) times daily.    LEVOMEFOLATE CALCIUM (L-METHYLFOLATE) 15 MG TAB        MELATONIN 10 MG TAB        MELOXICAM (MOBIC) 15 MG TABLET        NARCAN 4 MG/ACTUATION SPRY    USE 1 spray in nostril AS NEEDED    PRENATAL VIT-IRON FUM-FOLIC AC (PRENATAL VITAMIN) 27 MG IRON- 0.8 MG TAB    Take 1 tablet by mouth once daily.    QUETIAPINE (SEROQUEL) 200 MG TAB    Take 1 tablet (200 mg total) by mouth once daily.    SOFOSBUVIR-VELPATASVIR 400-100 MG TAB    Take 1 tablet by mouth once daily.   Discontinued Medications    BUTALBITAL-ACETAMINOPHEN-CAFFEINE -40 MG (FIORICET, ESGIC) -40 MG PER TABLET    Take 1 tablet by mouth 2 (two) times daily.    IBUPROFEN (ADVIL,MOTRIN) 800 MG TABLET    TAKE ONE TABLET BY MOUTH THREE TIMES DAILY    LOFEXIDINE (LUCEMYRA) 0.18 MG TAB    Take 0.54 mg by mouth 4 (four) times daily.      Social History     Tobacco Use    Smoking status: Current Every Day Smoker     Packs/day: 1.00     Years: 10.00     Pack years: 10.00     Types: Cigarettes    Smokeless tobacco: Never Used   Substance Use Topics    Alcohol use: Yes     Comment: occasional      Family History   Problem Relation Age of Onset    Stroke Mother     Depression Sister     Breast cancer Neg Hx     Ovarian cancer Neg Hx        I have reviewed the complete PMH, social  "history, surgical history, allergies and medications.  As well as family history.    Review of Systems   Constitutional: Negative for chills, fatigue, fever and unexpected weight change.   HENT: Positive for postnasal drip and sore throat. Negative for ear pain and rhinorrhea.    Eyes: Negative for redness and visual disturbance.   Respiratory: Positive for cough and shortness of breath. Negative for wheezing.    Cardiovascular: Negative for chest pain, palpitations and leg swelling.   Gastrointestinal: Negative for nausea and vomiting.   Genitourinary: Negative for difficulty urinating and hematuria.   Musculoskeletal: Positive for myalgias. Negative for arthralgias.   Skin: Negative for rash and wound.   Allergic/Immunologic: Positive for environmental allergies.   Neurological: Positive for headaches. Negative for weakness.   Psychiatric/Behavioral: Positive for agitation, decreased concentration and dysphoric mood. Negative for sleep disturbance. The patient is nervous/anxious.      Objective:   /88   Pulse 96   Temp 98.4 °F (36.9 °C) (Oral)   Ht 5' 4" (1.626 m)   Wt 49.9 kg (110 lb)   LMP  (LMP Unknown) Comment: patient is on the Depo  BMI 18.88 kg/m²   Physical Exam   Constitutional: She is oriented to person, place, and time. She appears well-developed and well-nourished. No distress.   HENT:   Head: Normocephalic and atraumatic.   Eyes: Pupils are equal, round, and reactive to light. EOM are normal.   Neck: Normal range of motion. Neck supple.   Cardiovascular: Normal rate, regular rhythm, normal heart sounds and intact distal pulses.   No murmur heard.  Pulmonary/Chest: Effort normal and breath sounds normal. No respiratory distress. She has no wheezes.   Abdominal: Soft. Bowel sounds are normal.   Musculoskeletal: Normal range of motion. She exhibits no edema.   Neurological: She is alert and oriented to person, place, and time. No cranial nerve deficit.   Skin: Skin is warm and dry. Capillary " refill takes less than 2 seconds.   Psychiatric: Thought content normal. Her mood appears anxious. Her speech is rapid and/or pressured and tangential. She is hyperactive. She is not actively hallucinating. Thought content is not paranoid and not delusional. Cognition and memory are normal. She expresses impulsivity. She does not exhibit a depressed mood. She expresses no homicidal and no suicidal ideation. She expresses no suicidal plans and no homicidal plans. She is attentive.   Nursing note and vitals reviewed.      Assessment:     1. Encounter for long-term (current) use of medications    2. Encounter for lipid screening for cardiovascular disease    3. Chronic hepatitis C without hepatic coma    4. Personal history of high risk medication treatment    5. Opioid dependence with opioid-induced disorder    6. Cough      MDM:   High medical complexity.  High risk.  I have Reviewed and summarized old records.  I have performed thorough medication reconciliation today and discussed risk and benefits of each medication.  I have reviewed labs and discussed with patient.  All questions were answered.  I am requesting old records and will review them once they are available.    I have signed for the following orders AND/OR meds.  Orders Placed This Encounter   Procedures    US Abdomen Complete     Standing Status:   Future     Standing Expiration Date:   3/3/2021     Order Specific Question:   Reason for Exam:     Answer:   elevated liver functions     Order Specific Question:   Is the patient pregnant?     Answer:   No    X-Ray Chest PA And Lateral     Standing Status:   Future     Standing Expiration Date:   3/3/2021     Order Specific Question:   May the Radiologist modify the order per protocol to meet the clinical needs of the patient?     Answer:   Yes    CBC Without Differential     Standing Status:   Standing     Number of Occurrences:   99     Standing Expiration Date:   5/2/2021    TSH     Standing Status:    Standing     Number of Occurrences:   99     Standing Expiration Date:   5/2/2021    Comprehensive metabolic panel     Standing Status:   Standing     Number of Occurrences:   99     Standing Expiration Date:   2/27/2040    Hemoglobin A1c     Standing Status:   Standing     Number of Occurrences:   99     Standing Expiration Date:   2/27/2040    Lipid panel     Standing Status:   Standing     Number of Occurrences:   99     Standing Expiration Date:   2/27/2040    HCV MONITOR, TOO AMPLICOR     Standing Status:   Future     Standing Expiration Date:   5/2/2021    Ambulatory referral/consult to Primary Care Behavioral Health (Opioids)     Standing Status:   Future     Standing Expiration Date:   4/3/2021     Referral Priority:   Routine     Referral Type:   Psychiatric     Referral Reason:   Specialty Services Required     Requested Specialty:   Behavioral Health     Number of Visits Requested:   1     Medications Ordered This Encounter   Medications    montelukast (SINGULAIR) 10 mg tablet     Sig: Take 1 tablet (10 mg total) by mouth every evening.     Dispense:  30 tablet     Refill:  11        Follow up in about 4 weeks (around 3/31/2020), or if symptoms worsen or fail to improve, for LAB RESULTS, Med refills.    If no improvement in symptoms or symptoms worsen, advised to call/follow-up at clinic or go to ER. Patient voiced understanding and all questions/concerns were addressed.     DISCLAIMER: This note was compiled by using a speech recognition dictation system and therefore please be aware that typographical / speech recognition errors can and do occur.  Please contact me if you see any errors specifically.    Paul Larson M.D.       Office: [phone]   [address] [place] [address]  FAX: [phone]

## 2020-03-03 ENCOUNTER — PATIENT MESSAGE (OUTPATIENT)
Dept: OBSTETRICS AND GYNECOLOGY | Facility: CLINIC | Age: 30
End: 2020-03-03

## 2020-03-03 ENCOUNTER — OFFICE VISIT (OUTPATIENT)
Dept: FAMILY MEDICINE | Facility: CLINIC | Age: 30
End: 2020-03-03
Payer: MEDICAID

## 2020-03-03 ENCOUNTER — LAB VISIT (OUTPATIENT)
Dept: LAB | Facility: HOSPITAL | Age: 30
End: 2020-03-03
Attending: SPECIALIST
Payer: MEDICAID

## 2020-03-03 VITALS
DIASTOLIC BLOOD PRESSURE: 88 MMHG | HEIGHT: 64 IN | SYSTOLIC BLOOD PRESSURE: 139 MMHG | BODY MASS INDEX: 18.78 KG/M2 | HEART RATE: 96 BPM | TEMPERATURE: 98 F | WEIGHT: 110 LBS

## 2020-03-03 DIAGNOSIS — F11.29 OPIOID DEPENDENCE WITH OPIOID-INDUCED DISORDER: ICD-10-CM

## 2020-03-03 DIAGNOSIS — Z32.00 POSSIBLE PREGNANCY: ICD-10-CM

## 2020-03-03 DIAGNOSIS — F11.20 OPIOID TYPE DEPENDENCE, CONTINUOUS: Primary | ICD-10-CM

## 2020-03-03 DIAGNOSIS — Z13.6 ENCOUNTER FOR LIPID SCREENING FOR CARDIOVASCULAR DISEASE: ICD-10-CM

## 2020-03-03 DIAGNOSIS — R05.9 COUGH: ICD-10-CM

## 2020-03-03 DIAGNOSIS — Z71.51 DRUG ABUSE COUNSELING AND SURVEILLANCE OF DRUG ABUSER: ICD-10-CM

## 2020-03-03 DIAGNOSIS — Z92.29 PERSONAL HISTORY OF HIGH RISK MEDICATION TREATMENT: ICD-10-CM

## 2020-03-03 DIAGNOSIS — Z79.899 ENCOUNTER FOR LONG-TERM (CURRENT) USE OF MEDICATIONS: Primary | ICD-10-CM

## 2020-03-03 DIAGNOSIS — Z13.220 ENCOUNTER FOR LIPID SCREENING FOR CARDIOVASCULAR DISEASE: ICD-10-CM

## 2020-03-03 DIAGNOSIS — B18.2 CHRONIC HEPATITIS C WITHOUT HEPATIC COMA: ICD-10-CM

## 2020-03-03 DIAGNOSIS — G40.001 LOCALIZATION-RELATED IDIOPATHIC EPILEPSY AND EPILEPTIC SYNDROMES WITH SEIZURES OF LOCALIZED ONSET, NOT INTRACTABLE, WITH STATUS EPILEPTICUS: ICD-10-CM

## 2020-03-03 PROBLEM — T42.6X1A GABAPENTIN OVERDOSE: Status: ACTIVE | Noted: 2020-03-03

## 2020-03-03 PROBLEM — F19.10 SUBSTANCE ABUSE: Status: ACTIVE | Noted: 2018-08-21

## 2020-03-03 PROBLEM — F15.10 METHAMPHETAMINE ABUSE: Status: ACTIVE | Noted: 2020-03-03

## 2020-03-03 PROBLEM — T50.901A OVERDOSE: Status: ACTIVE | Noted: 2018-08-21

## 2020-03-03 LAB
HCG INTACT+B SERPL-ACNC: <1.2 MIU/ML
VALPROATE SERPL-MCNC: 39 UG/ML (ref 50–100)

## 2020-03-03 PROCEDURE — 80164 ASSAY DIPROPYLACETIC ACD TOT: CPT

## 2020-03-03 PROCEDURE — 36415 COLL VENOUS BLD VENIPUNCTURE: CPT | Mod: PO

## 2020-03-03 PROCEDURE — 99214 OFFICE O/P EST MOD 30 MIN: CPT | Mod: PBBFAC,PO | Performed by: FAMILY MEDICINE

## 2020-03-03 PROCEDURE — 99999 PR PBB SHADOW E&M-EST. PATIENT-LVL IV: CPT | Mod: PBBFAC,,, | Performed by: FAMILY MEDICINE

## 2020-03-03 PROCEDURE — 80177 DRUG SCRN QUAN LEVETIRACETAM: CPT

## 2020-03-03 PROCEDURE — 99215 OFFICE O/P EST HI 40 MIN: CPT | Mod: S$PBB,,, | Performed by: FAMILY MEDICINE

## 2020-03-03 PROCEDURE — 84702 CHORIONIC GONADOTROPIN TEST: CPT

## 2020-03-03 PROCEDURE — 99215 PR OFFICE/OUTPT VISIT, EST, LEVL V, 40-54 MIN: ICD-10-PCS | Mod: S$PBB,,, | Performed by: FAMILY MEDICINE

## 2020-03-03 PROCEDURE — 99999 PR PBB SHADOW E&M-EST. PATIENT-LVL IV: ICD-10-PCS | Mod: PBBFAC,,, | Performed by: FAMILY MEDICINE

## 2020-03-03 RX ORDER — MONTELUKAST SODIUM 10 MG/1
10 TABLET ORAL NIGHTLY
Qty: 30 TABLET | Refills: 11 | Status: SHIPPED | OUTPATIENT
Start: 2020-03-03 | End: 2020-04-02

## 2020-03-03 NOTE — ASSESSMENT & PLAN NOTE
Likely due to worsening allergic rhinitis with exacerbation from smoking and possibly bronchitis.  With bloody sputum will have chest x-ray ordered.  However patient deferred test.  Vital signs are stable today.  No fever or chest pain.      Discussed condition course and signs and symptoms to expect.  Patient advised take anti-inflammatories and or Tylenol for pain or fever.  ER precautions.  Call MD or follow-up to clinic if not improving or worsening symptoms.

## 2020-03-03 NOTE — PATIENT INSTRUCTIONS
Follow up in about 4 weeks (around 3/31/2020), or if symptoms worsen or fail to improve, for LAB RESULTS, Med refills.     If no improvement in symptoms or symptoms worsen, please be advised to call MD, follow-up at clinic and/or go to ER if becomes severe.    Paul Larson M.D.        We Offer TELEHEALTH & Same Day Appointments!   Book your Telehealth appointment with me through my nurse or   Clinic appointments on [facility]!    [address] [place] [address]    Office: [phone]   FAX: [phone]    Check out my Facebook Page and Follow Me at: [email or web address]    Check out my website at [facility] by clicking on: [email or web address]    To Schedule appointments online, go to [facility]: [email or web address]

## 2020-03-03 NOTE — ASSESSMENT & PLAN NOTE
The patient was checked in the [facility] Board of Pharmacy's  Prescription Monitoring Program. There appears to be significant incongruities with the patient's verbalized history.     Patient is requesting refills on multiple high risk medication and to be started on Adderall.  Patient was advised that this is not best practice.    Patient does have follow-up with Neurology soon for her seizure disorder.

## 2020-03-03 NOTE — ASSESSMENT & PLAN NOTE
Check viral load.  Patient strongly advised to adhere to treatment to obtain SVR.  Reorder abdominal ultrasound that was not performed due to cancellation.    Patient noted to be noncompliant with medication.    Continue Epclusa daily.  Recheck liver function and other labs as well.    ER precautions

## 2020-03-05 ENCOUNTER — PATIENT MESSAGE (OUTPATIENT)
Dept: FAMILY MEDICINE | Facility: CLINIC | Age: 30
End: 2020-03-05

## 2020-03-05 ENCOUNTER — TELEPHONE (OUTPATIENT)
Dept: FAMILY MEDICINE | Facility: CLINIC | Age: 30
End: 2020-03-05

## 2020-03-05 DIAGNOSIS — J22 LRTI (LOWER RESPIRATORY TRACT INFECTION): Primary | ICD-10-CM

## 2020-03-05 RX ORDER — AZITHROMYCIN 250 MG/1
TABLET, FILM COATED ORAL
Qty: 6 TABLET | Refills: 0 | Status: SHIPPED | OUTPATIENT
Start: 2020-03-05 | End: 2020-03-10

## 2020-03-05 NOTE — TELEPHONE ENCOUNTER
----- Message from Ct Linares MA sent at 3/5/2020  1:25 PM CST -----  Pt states she is still having cough and congestion with green/yellow mucus. Asking if an antibiotic can be sent in.

## 2020-03-06 ENCOUNTER — PATIENT MESSAGE (OUTPATIENT)
Dept: FAMILY MEDICINE | Facility: CLINIC | Age: 30
End: 2020-03-06

## 2020-03-06 LAB — LEVETIRACETAM SERPL-MCNC: 5.1 UG/ML (ref 3–60)

## 2020-03-13 ENCOUNTER — TELEPHONE (OUTPATIENT)
Dept: RADIOLOGY | Facility: HOSPITAL | Age: 30
End: 2020-03-13

## 2020-03-17 ENCOUNTER — DOCUMENTATION ONLY (OUTPATIENT)
Dept: PRIMARY CARE CLINIC | Facility: CLINIC | Age: 30
End: 2020-03-17

## 2020-03-18 ENCOUNTER — TELEPHONE (OUTPATIENT)
Dept: PRIMARY CARE CLINIC | Facility: CLINIC | Age: 30
End: 2020-03-18

## 2020-03-18 NOTE — PROGRESS NOTES
[facility] pharmacist performed a medication reconciliation on the patient's controlled substances and psychotropic medications using the [place]  and [place] prescription medication fills portal.    Patient currently filling opioids on time from multiple providers (most current is Dr. Viera) and is deemed to be at moderate/high risk of overdose per  records.  Patient currently on sublocade monthly injection for opioid dependence each month since 12/19.  Prior to this, patient was on buprenorphine + naloxone film for roughly one year. Patient has also received multiple other controlled medications including monthly gabapentin and Fioricet.  Patient has filled phenobarbital in the past as well as diazepam.  Co-prescription of naloxone is currently recommended.  Continual monitoring and assessment of additional risk factors for opioid overdose should always be considered.    Patient has filled >3 opioid prescriptions in the past 4 month, and therefore meets the chronic opioid criteria for enrollment in the program.    Information regarding patient's psychotropic medication history is unknown due to lack of documentation in [employer] database.  Follow-up on medication history and adherence should be considered.      Recommendations for PCP:  1.  Co-prescription of Narcan 4 mg nasal spray is recommended at this time in patient on treatment for opioid dependence  2.  Information regarding antidepressant history is unknown due to lack of records in [employer] database.  [facility] pharmacist will determine his role in treatment following patient's intake appointment with [facility] LCSW.      Scar Bledsoe, PharmD, BCPP  [facility] Pharmacist

## 2020-03-19 ENCOUNTER — PATIENT MESSAGE (OUTPATIENT)
Dept: FAMILY MEDICINE | Facility: CLINIC | Age: 30
End: 2020-03-19

## 2020-03-19 NOTE — TELEPHONE ENCOUNTER
If she's switching PCP to Dr Mckenna or Dr Sebastian she will need to get them to order the US. I'm not sure if they are currently taking new patients.

## 2020-03-20 ENCOUNTER — PATIENT MESSAGE (OUTPATIENT)
Dept: NEUROLOGY | Facility: CLINIC | Age: 30
End: 2020-03-20

## 2020-03-24 ENCOUNTER — TELEPHONE (OUTPATIENT)
Dept: NEUROLOGY | Facility: CLINIC | Age: 30
End: 2020-03-24

## 2020-03-25 ENCOUNTER — PATIENT OUTREACH (OUTPATIENT)
Dept: ADMINISTRATIVE | Facility: OTHER | Age: 30
End: 2020-03-25

## 2020-03-25 NOTE — PROGRESS NOTES
New Patient     The patient location is: patients home in [place]  The chief complaint leading to consultation is: evaluation for headaches  Visit type: Virtual visit with synchronous audio and video  Total time spent with patient: 43 minutes  Each patient to whom he or she provides medical services by telemedicine is:  (1) informed of the relationship between the physician and patient and the respective role of any other health care provider with respect to management of the patient; and (2) notified that he or she may decline to receive medical services by telemedicine and may withdraw from such care at any time.    Notes:       SUBJECTIVE:  Patient ID: Sara Sanford   MRN: [id number]  Referred By: Dr. Dung Werner  Chief Complaint: Headache      History of Present Illness:   29 y.o. female with a PMHx of migraines, epilepsy, anxiety, methamphetamine and substance abuse, Hep C and B, back pain, tobacco use, who presents to clinic alone for evaluation of headaches.   PMHx negative for TBI, Meningitis, Aneurysms, Kidney Stones, asthma, GI bleed, osteoporosis, CAD/MI, CVA/TIA, DM, cancer  Family Hx Positive for mother with migraines    Patient would like to see someone for ADHD, epilepsy, and HA's. Would like referrals to psychiatry and epilepsy clinics for these.     Headache History:  Onset - 10 years +  Previous Hx of HA - previously saw Dr. Barrios at [facility] who was seeing her for HA's and epilepsy but is no longer seeing him due to missing too many appts, was on valium, gabapentin, and fioricet, recently in rehab where they switched to depakote and cymbalta for her HA's, states she stopped the gabapentin bc she wasn't having back pain anymore  Location/Radiation - bifrontal  Quality - pounding  Duration - 30 min - 1 day  Intensity (range) - 3-10/10  Frequency - 15/30 ha days per month since December, 12/30 are debilitating  Triggers - stress  Aggravating Factors - loud noises and lights, food  smells  Alleviating Factors - fioricet, sleep  Recent Changes - change in meds (valium and gabapentin to depakote and cymbalta)  Prodrome/Aura - no  HA today? - 2/10  Time of day of most headaches- upon awakening when oversleeping  Sleep - wakes w/ HA's when she oversleeps, wakes frequently, snores, doesn't wakes feeling refreshed, resolution of headache with sleep    Associated symptoms with the headache: photophobia, phonophobia, nausea/vomiting, hyperosmia, worsened by exertion    Treatments Tried   Dexamethasone  depakote  cymbalta  Gabapentin  Ketorolac tab  keppra  mopic  zofran - helps  Prednisone  Atenolol  fioricet - helps  Flexeril  Diclofenac tab  Hydroxyzine  Ibuprofen  lamictal  Robaxin  Medrol dose pack  Naproxen    Social History  Alcohol - once per week  Smoke - 1/2 ppd   Recreational Drug Use- recent rehab admission    Current Medications:    Current Outpatient Medications:     buprenorphine (SUBLOCADE) 300 mg/1.5 mL injection, , Disp: , Rfl:     cholecalciferol, vitamin D3, 5,000 unit capsule, TAKE 1 CAPSULE BY MOUTH ONCE A DAY (6AM), Disp: , Rfl: 0    divalproex (DEPAKOTE) 500 MG TbEC, Take 1 tablet (500 mg total) by mouth 2 (two) times daily., Disp: 60 tablet, Rfl: 3    DULoxetine (CYMBALTA) 60 MG capsule, Take 1 capsule (60 mg total) by mouth once daily., Disp: 30 capsule, Rfl: 11    gabapentin (NEURONTIN) 800 MG tablet, Take 1 tablet (800 mg total) by mouth 3 (three) times daily., Disp: 90 tablet, Rfl: 11    levETIRAcetam (KEPPRA) 750 MG Tab, Take 2 tablets (1,500 mg total) by mouth 2 (two) times daily., Disp: 120 tablet, Rfl: 11    levomefolate calcium (L-METHYLFOLATE) 15 mg Tab, , Disp: , Rfl:     melatonin 10 mg Tab, , Disp: , Rfl:     montelukast (SINGULAIR) 10 mg tablet, Take 1 tablet (10 mg total) by mouth every evening., Disp: 30 tablet, Rfl: 11    NARCAN 4 mg/actuation Spry, USE 1 spray in nostril AS NEEDED, Disp: , Rfl: 0    prenatal vit-iron fum-folic ac (PRENATAL VITAMIN)  27 mg iron- 0.8 mg Tab, Take 1 tablet by mouth once daily., Disp: 100 tablet, Rfl: 3    QUEtiapine (SEROQUEL) 200 MG Tab, Take 1 tablet (200 mg total) by mouth once daily., Disp: 10 tablet, Rfl: 0    rizatriptan (MAXALT-MLT) 5 MG disintegrating tablet, Take at onset of migraine, can repeat in 2 hrs if needed.  No more than 2 tabs per day or 3 days/wk., Disp: 12 tablet, Rfl: 3    sofosbuvir-velpatasvir 400-100 mg Tab, Take 1 tablet by mouth once daily., Disp: 28 tablet, Rfl: 2    Current Facility-Administered Medications:     medroxyPROGESTERone (DEPO-PROVERA) injection 150 mg, 150 mg, Intramuscular, Q90 Days, Dung Werner MD    Review of Systems - as per HPI, otherwise a balanced 10 systems review is negative.    OBJECTIVE:  Vitals:  LMP  (LMP Unknown) Comment: patient is on the Depo    Physical Exam: limitations due to video visit platform due to current COVID 19 public health emergency. When it is safe for patients to return to clinic, I plan to perform a full and complete physical exam at the next in person visit. Able to perform the following with the patient's assistance:  Constitutional: she appears well-developed and well-nourished. she is well groomed. NAD  HENT:    Head: Normocephalic and atraumatic,  Frontalis was TTP, temporalis was TTP   Eyes: Conjunctivae and EOM are normal. Pupils are equal and round  Neck: Occiput and trapezius TTP   Musculoskeletal: Normal range of motion.   Psychiatric: Normal mood and affect.     Neuro exam:    Mental status:  The patient is alert and oriented to person, place and time.  Language is intact and fluent  Remote and recent memory are intact  Normal attention and concentration  Mood is stable    Cranial Nerves:  Extraocular movements are intact and without nystagmus.    Facial movement is symmetric.  Facial sensation is intact.    Tongue in midline without fasciculation.   FROM of neck in all (6) directions without pain  Shoulder shrug symmetrical.    Coordination:      Finger to nose - normal and symmetric bilaterally   Heel to shin test - normal and symmetric bilaterally     Motor:  Normal muscle bulk and symmetry. No fasciculations were noted.   Tremor not apparent   Pronator drift not apparent.                          Sensory:  RUE  intact light touch  LUE intact light touch  RLE intact light touch  LLE intact light touch    Gait:   Normal gait  Tandem, Heel, and Toe Walk - able to perform without difficulty    Review of Data:   Notes from pcp reviewed   Labs:  Lab Visit on 03/03/2020   Component Date Value Ref Range Status    hCG Quant 03/03/2020 <1.2  See Text mIU/mL Final    Levetiracetam Lvl 03/03/2020 5.1  3.0 - 60.0 ug/mL Final    Valproic Acid Level 03/03/2020 39.0* 50.0 - 100.0 ug/mL Final   Office Visit on 01/07/2020   Component Date Value Ref Range Status    POC Preg Test, Ur 01/07/2020 Negative  Negative Final     Acceptable 01/07/2020 Yes   Final    Final Pathologic Diagnosis 01/07/2020    Final                    Value:Specimen Adequacy  Satisfactory for interpretation. Endocervical component is present.    [place] Category  Negative for intraepithelial lesion or malignancy.  Shift in eileen suggestive of bacterial vaginosis.      Disclaimer 01/07/2020    Final                    Value:The Pap smear is a screening test that aids in the detection of cervical  cancer and cancer precursors. Both false positive and false negative results  can occur. The test should be used at regular intervals, and positive results  should be confirmed before definitive therapy.  This liquid based specimen is processed using the  or  Thin PrepPAP  System. This specimen has been analyzed by the ThinPrep Imaging System  (Biottery), an automated imaging and review system which assists  the laboratory in evaluating cells on ThinPrep PAP tests. Following automated  imaging, selected fields from every slide are reviewed by a  cytotechnologist  and/or pathologist.      HPV other High Risk types, PCR 01/07/2020 Negative  Negative Final    HPV High Risk type 16, PCR 01/07/2020 Negative  Negative Final    HPV High Risk type 18, PCR 01/07/2020 Negative  Negative Final    Chlamydia, Amplified DNA 01/07/2020 Not Detected  Not Detected Final    N gonorrhoeae, amplified DNA 01/07/2020 Not Detected  Not Detected Final   Lab Visit on 01/02/2020   Component Date Value Ref Range Status    RPR 01/02/2020 Non-reactive  Non-reactive Final    Hepatitis B Surface Ag 01/02/2020 Negative  Negative Final    Hep B C IgM 01/02/2020 Negative  Negative Final    Hep A IgM 01/02/2020 Negative  Negative Final    Hepatitis C Ab 01/02/2020 Positive* Negative Final    HIV 1/2 Ag/Ab 01/02/2020 Negative  Negative Final    HSV 1 IgG 01/02/2020 Negative  Negative Final    HSV 2 IgG 01/02/2020 Negative  Negative Final    HSV Ab, IgM by EIA 01/02/2020 1.23* <=0.90 INDEX Final    HIV 1/2 Ag/Ab 01/02/2020 Negative  Negative Final    RPR 01/02/2020 Non-reactive  Non-reactive Final    Hepatitis B Surface Ag 01/02/2020 Negative  Negative Final    Hep B C IgM 01/02/2020 Negative  Negative Final    Hep A IgM 01/02/2020 Negative  Negative Final    Hepatitis C Ab 01/02/2020 Positive* Negative Final     Imaging:  No results found for this or any previous visit.  Note: I have independently reviewed any/all imaging/labs/tests and agree with the report (s) as documented.  Any discrepancies will be as noted/demarcated by free text.  SHAI GROVER 3/26/2020    ASSESSMENT:  1. Chronic migraine    2. Anxiety    3. Sleep concern    4. History of seizure          PLAN:  - Discussed symptoms appear to be consistent with migraines, discussed treatment options and patient agreed with the following plan:  - ppx - continue depakote (500mg BID), will obtain baseline labs (ordered by pcp, hasn't gone to get them yet, told patient to obtain these as soon as safe to do so), increase  "cymbalta to 60mg  - abortive - d/c fioricet, start maxalt 5mg prn  - epilepsy - referral to epilepsy clinic, continue depakote and keppra  - sleep disturbance - patient reports she can sleep for a whole day and feels her "sleep cycle is off", also reports she wakes frequently throughout night and feels sleep is poor quality, will refer to psych and sleep med to eval  - anxiety - referral to psych  - risks, benefits, and potential side effects of maxalt, depaktoe, cymbalta discussed   - alternative treatment options offered   - importance of healthy diet, regular exercise and sleep hygiene in the treatment of headaches    - Start tracking headaches via Migraine Hung kavita on phone   - RTC in 6 wks     Orders Placed This Encounter    Ambulatory consult to Psychiatry    Ambulatory referral/consult to Neurology Epilepsy    Ambulatory referral/consult to Sleep Disorders    rizatriptan (MAXALT-MLT) 5 MG disintegrating tablet    DULoxetine (CYMBALTA) 60 MG capsule    divalproex (DEPAKOTE) 500 MG TbEC       I have discussed realistic goals of care with patient at length as well as medication options, and need for lifestyle adjustment. I have explained that treatment will take time. We have agreed that the goal will be to reduce frequency/intensity/quantity of HA, not to be completely HA free. I have explained my non narcotic policy regarding headache treatment.    Patient agreeable to work on lifestyle adjustments.    Questions and concerns were sought and answered to the patient's stated verbal satisfaction.  The patient verbalizes understanding and agreement with the above stated treatment plan.     CC: MD Muriel Wright PARichardC  [facility]  [phone]    Dr. Villegas was available during today's encounter.   "

## 2020-03-26 ENCOUNTER — PATIENT MESSAGE (OUTPATIENT)
Dept: FAMILY MEDICINE | Facility: CLINIC | Age: 30
End: 2020-03-26

## 2020-03-26 ENCOUNTER — TELEPHONE (OUTPATIENT)
Dept: FAMILY MEDICINE | Facility: CLINIC | Age: 30
End: 2020-03-26

## 2020-03-26 ENCOUNTER — OFFICE VISIT (OUTPATIENT)
Dept: NEUROLOGY | Facility: CLINIC | Age: 30
End: 2020-03-26
Payer: MEDICAID

## 2020-03-26 ENCOUNTER — PATIENT MESSAGE (OUTPATIENT)
Dept: NEUROLOGY | Facility: CLINIC | Age: 30
End: 2020-03-26

## 2020-03-26 DIAGNOSIS — Z76.89 SLEEP CONCERN: ICD-10-CM

## 2020-03-26 DIAGNOSIS — Z87.898 HISTORY OF SEIZURE: ICD-10-CM

## 2020-03-26 DIAGNOSIS — F41.9 ANXIETY: ICD-10-CM

## 2020-03-26 PROCEDURE — 99204 PR OFFICE/OUTPT VISIT, NEW, LEVL IV, 45-59 MIN: ICD-10-PCS | Mod: 95,,, | Performed by: PHYSICIAN ASSISTANT

## 2020-03-26 PROCEDURE — 99204 OFFICE O/P NEW MOD 45 MIN: CPT | Mod: 95,,, | Performed by: PHYSICIAN ASSISTANT

## 2020-03-26 RX ORDER — BUTALBITAL, ACETAMINOPHEN AND CAFFEINE 50; 325; 40 MG/1; MG/1; MG/1
TABLET ORAL
COMMUNITY
Start: 2020-03-19 | End: 2020-03-26 | Stop reason: ALTCHOICE

## 2020-03-26 RX ORDER — DIVALPROEX SODIUM 500 MG/1
500 TABLET, DELAYED RELEASE ORAL 2 TIMES DAILY
Qty: 60 TABLET | Refills: 3 | Status: SHIPPED | OUTPATIENT
Start: 2020-03-26 | End: 2020-10-22 | Stop reason: SDUPTHER

## 2020-03-26 RX ORDER — RIZATRIPTAN BENZOATE 5 MG/1
TABLET, ORALLY DISINTEGRATING ORAL
Qty: 12 TABLET | Refills: 3 | Status: SHIPPED | OUTPATIENT
Start: 2020-03-26 | End: 2020-10-22 | Stop reason: SDUPTHER

## 2020-03-26 RX ORDER — BUTALBITAL, ACETAMINOPHEN AND CAFFEINE 50; 325; 40 MG/1; MG/1; MG/1
TABLET ORAL
OUTPATIENT
Start: 2020-03-26

## 2020-03-26 RX ORDER — DULOXETIN HYDROCHLORIDE 60 MG/1
60 CAPSULE, DELAYED RELEASE ORAL DAILY
Qty: 30 CAPSULE | Refills: 11 | Status: SHIPPED | OUTPATIENT
Start: 2020-03-26 | End: 2021-06-25

## 2020-03-26 NOTE — LETTER
March 26, 2020      Dung Werner MD  [address] [place] [address]           [facility]  [address]  [facility] [address]  Phone: [phone]  Fax: [phone]          Patient: Sara Sanford   MR Number: [id number]   YOB: 1990   Date of Visit: 3/26/2020       Dear Dr. Dung Werner:    Thank you for referring Sara Sanford to me for evaluation. Attached you will find relevant portions of my assessment and plan of care.    If you have questions, please do not hesitate to call me. I look forward to following Sara Sanford along with you.    Sincerely,    Muriel Bryson PA-C    Enclosure  CC:  No Recipients    If you would like to receive this communication electronically, please contact [email or web address] or [phone] to request more information on Go[employer] Link access.    For providers and/or their staff who would like to refer a patient to [facility], please contact us through our one-stop-shop provider referral line, [facility], at [phone].    If you feel you have received this communication in error or would no longer like to receive these types of communications, please e-mail [email or web address]

## 2020-03-26 NOTE — TELEPHONE ENCOUNTER
Patient notified that Dr. Larson did not approve this refill as her neurologist discontinued Fiorcet as of today's video visit via patient message.

## 2020-03-26 NOTE — PATIENT INSTRUCTIONS
Plan:  - continue depakote 500mg twice a day  - get labs completed  - increase cymbalta to 60mg daily  - stop fioricet  - start maxalt 5mg at the onset of a headache  - referral to epilepsy clinic and psychiatry for epilepsy management and sleep   - follow up with me in 6 weeks    Supplements for Migraine:  1. Magnesium Oxide - 400mg by mouth daily  2. Riboflavin (Vitamin B2) - 400mg by mouth daily  3. Coenzyme Q10 - 200mg tablet by mouth daily    - Please download Migraine Hung kavita on phone and begin tracking your headaches     Sleep Hygiene:  1. Avoid watching TV, eating, and discussing emotional issues in bed. The bed should be used for sleep and sex only. If not, we can associate the bed with other activities and it often becomes difficult to fall asleep.  2. Minimize noise, light, and temperature extremes during sleep with ear plugs, window blinds, or an electric blanket or air conditioner. Even the slightest nighttime noises or luminescent lights can disrupt the quality of your sleep. Try to keep your bedroom at a comfortable temperature -- not too hot (above 75 degrees) or too cold (below 54 degrees).  3. Try not to drink fluids after 8 p.m. This may reduce awakenings due to urination.  4. Avoid naps, but if you do nap, make it no more than about 25 minutes about eight hours after you awake. But if you have problems falling asleep, then no naps for you.  5. Do not expose your self to bright light if you need to get up at night. Use a small night-light instead.  6. Nicotine is a stimulant and should be avoided particularly near bedtime and upon night awakenings. Having a smoke before bed, although it may feel relaxing, is actually putting a stimulant into your bloodstream.  7. Caffeine is also a stimulant and is present in coffee (100-200 mg), soda (50-75 mg), tea (50-75 mg), and various over-the-counter medications. Caffeine should be discontinued at least four to six hours before bedtime. If you consume  large amounts of caffeine and you cut your self off too quickly, beware; you may get headaches that could keep you awake.  8. Although alcohol is a depressant and may help you fall asleep, the subsequent metabolism that clears it from your body when you are sleeping causes a withdrawal syndrome. This withdrawal causes awakenings and is often associated with nightmares and sweats.  9. A light snack may be sleep-inducing, but a heavy meal too close to bedtime interferes with sleep. Stay away from protein and stick to carbohydrates or dairy products. Milk contains the amino acid L-tryptophan, which has been shown in research to help people go to sleep. So milk and cookies or crackers (without chocolate) may be useful and taste good as well.

## 2020-03-27 ENCOUNTER — TELEPHONE (OUTPATIENT)
Dept: PRIMARY CARE CLINIC | Facility: CLINIC | Age: 30
End: 2020-03-27

## 2020-03-27 NOTE — PROGRESS NOTES
Behavioral Health Community Health Worker  Initial Assessment  Completed by:  Elsa Flanagan    Date:  3/27/2020    Patient Enrollment in Behavioral Health Program:  · Patient verbalized understanding of Behavioral Health Integration services to include:  · Patient understands that CHW, LCSW, PharmD and consulting Psychiatrist are members of the care team working collaboratively with his/her primary care provider: Yes  · Patient understands that activation of their [facility] patient portal account is required for accessing the full scope of team services: Yes  · Patient understands that some counseling sessions may occur via video: Yes  · Patient understands that services will be provided by the CHW, LCSW, and PharmD at no charge for a LIMITED TIME: Yes  · Clinic visits with the psychiatrist may be subject to a co-pay based on your insurance: Yes  · Patient consents to enroll in BHI program: Yes    Assessments     Single Item Health Literacy Scale:  · How often do you need to have someone help you read instructions, pamphlets or other written material from your doctor or pharmacy?: Rarely    Promis 10:  · Promis 10 Responses  · In general, would you say your health is: Poor  · In general, would you say your quality of life is: Poor  · In general, how would you rate your physical health?: Fair  · In general, how would you rate your mental health, including your mood and your ability to think?: Poor  · In general, how would you rate your satisfaction with your social activities and relationships?: Poor  · In general, please rate how well you carry out your usual social activities and roles. (This includes activities at home, at work and in your community, and responsibilities as a parent, child, spouse, employee, friend, etc.): Poor  · To what extent are you able to carry out your everyday physical activities such as walking, climbing stairs, carrying groceries, or moving a chair? : Moderately  · In the past 7 days, how  often have you been bothered by emotional problems such as feeling anxious, depressed or irritable?: Always  · In the past 7 days, how would you rate your fatigue on average?: Severe  · In the past 7 days, on a scale of 0 to 10 (where 0 is no pain and 10 is the worst pain imaginable) how would you rate your pain on average?: pain score 4-6  · Global Physical Health: 10  · Global Mental health Score: 4    Depression PHQ:  PHQ9 3/27/2020   Total Score 24         Generalized Anxiety Disorder 7-Item Scale:  GAD7 3/27/2020   1. Feeling nervous, anxious, or on edge? 3   2. Not being able to stop or control worrying? 3   3. Worrying too much about different things? 3   4. Trouble relaxing? 3   5. Being so restless that it is hard to sit still? 3   6. Becoming easily annoyed or irritable? 3   7. Feeling afraid as if something awful might happen? 3   8. If you checked off any problems, how difficult have these problems made it for you to do your work, take care of things at home, or get along with other people? 3   GREGG-7 Score 21       History     Social History     Socioeconomic History    Marital status: Single     Spouse name: Not on file    Number of children: Not on file    Years of education: Not on file    Highest education level: Not on file   Occupational History    Not on file   Social Needs    Financial resource strain: Not very hard    Food insecurity:     Worry: Never true     Inability: Never true    Transportation needs:     Medical: No     Non-medical: No   Tobacco Use    Smoking status: Current Every Day Smoker     Packs/day: 1.00     Years: 10.00     Pack years: 10.00     Types: Cigarettes    Smokeless tobacco: Never Used   Substance and Sexual Activity    Alcohol use: Yes     Comment: occasional    Drug use: Not Currently     Types: Hydrocodone    Sexual activity: Yes     Partners: Male     Birth control/protection: None   Lifestyle    Physical activity:     Days per week: 0 days     Minutes  "per session: 0 min    Stress: Very much   Relationships    Social connections:     Talks on phone: Never     Gets together: Never     Attends [employer] service: 1 to 4 times per year     Active member of club or organization: No     Attends meetings of clubs or organizations: Never     Relationship status: Never    Other Topics Concern    Not on file   Social History Narrative    Not on file       Call Summary     Patient was referred to the Opioid BHI program by Primary Care Provider, Dr. Paul Larson. JESSICA Wynn contacted Ms. Sanford  who reports chronic pain that limits her activities of daily living (ADLs).   Patient scored "24" on the PHQ9 and "21" on the GREGG 7.  Based on these scores patient is eligible for the Behavioral health Integration Program.  JESSICA completed the intake and scheduled an appointment for patient with Julieta Ashley LCSW on 03/30/2020 at 2:00 pm.      "

## 2020-03-29 RX ORDER — BUTALBITAL, ACETAMINOPHEN AND CAFFEINE 50; 325; 40 MG/1; MG/1; MG/1
TABLET ORAL
Qty: 30 TABLET | Refills: 1 | OUTPATIENT
Start: 2020-03-29

## 2020-03-30 NOTE — TELEPHONE ENCOUNTER
Medication refused due to failing protocol.  Medication discontinued by Neurology    Requested Prescriptions   Pending Prescriptions Disp Refills    butalbital-acetaminophen-caffeine -40 mg (FIORICET, ESGIC) -40 mg per tablet [Pharmacy Med Name: butalbital-acetaminophen-caffeine 50 mg-325 mg-40 mg tablet] 30 tablet 1     Sig: TAKE ONE TABLET BY MOUTH TWICE DAILY       Narcotics Refill Protocol Passed - 3/29/2020  8:01 AM        Passed - Patient not pregnant        Passed - Patient seen within 3 months     Last visit with Paul Larson MD: 3/3/2020  Last visit in [facility]: 3/3/2020    Patient's next visit in [facility]: 3/31/2020           Passed - Med not refilled within 4 weeks

## 2020-04-03 ENCOUNTER — TELEPHONE (OUTPATIENT)
Dept: PRIMARY CARE CLINIC | Facility: CLINIC | Age: 30
End: 2020-04-03

## 2020-04-07 ENCOUNTER — PATIENT MESSAGE (OUTPATIENT)
Dept: PRIMARY CARE CLINIC | Facility: CLINIC | Age: 30
End: 2020-04-07

## 2020-04-15 ENCOUNTER — TELEPHONE (OUTPATIENT)
Dept: PRIMARY CARE CLINIC | Facility: CLINIC | Age: 30
End: 2020-04-15

## 2020-04-25 RX ORDER — BUTALBITAL, ACETAMINOPHEN AND CAFFEINE 50; 325; 40 MG/1; MG/1; MG/1
TABLET ORAL
Qty: 30 TABLET | Refills: 1 | OUTPATIENT
Start: 2020-04-25

## 2020-05-07 ENCOUNTER — PATIENT OUTREACH (OUTPATIENT)
Dept: ADMINISTRATIVE | Facility: OTHER | Age: 30
End: 2020-05-07

## 2020-05-11 ENCOUNTER — TELEPHONE (OUTPATIENT)
Dept: PRIMARY CARE CLINIC | Facility: CLINIC | Age: 30
End: 2020-05-11

## 2020-05-12 ENCOUNTER — DOCUMENTATION ONLY (OUTPATIENT)
Dept: PRIMARY CARE CLINIC | Facility: CLINIC | Age: 30
End: 2020-05-12

## 2020-05-12 NOTE — PROGRESS NOTES
Reason for Withdrawal  Unreachable by Phone   Last level completed: Ms. Sanford's Initial Screening was completed by JESSICA Wynn on 3/27/2020. Patient missed first appointment with Julieta Ashley LCSW. 3/30/2020  If Unreachable:  Date of Last Contact Attempted: Telephone call May 11, 2020 by JESSICA Wynn  Date of Last Contact Made: Letter Roger patient message, 4/7/2020.

## 2020-05-15 ENCOUNTER — PATIENT MESSAGE (OUTPATIENT)
Dept: FAMILY MEDICINE | Facility: CLINIC | Age: 30
End: 2020-05-15

## 2020-06-08 PROBLEM — Z13.6 ENCOUNTER FOR LIPID SCREENING FOR CARDIOVASCULAR DISEASE: Status: RESOLVED | Noted: 2020-03-03 | Resolved: 2020-06-08

## 2020-06-08 PROBLEM — Z13.220 ENCOUNTER FOR LIPID SCREENING FOR CARDIOVASCULAR DISEASE: Status: RESOLVED | Noted: 2020-03-03 | Resolved: 2020-06-08

## 2020-06-26 ENCOUNTER — PATIENT MESSAGE (OUTPATIENT)
Dept: FAMILY MEDICINE | Facility: CLINIC | Age: 30
End: 2020-06-26

## 2020-07-05 ENCOUNTER — PATIENT MESSAGE (OUTPATIENT)
Dept: OBSTETRICS AND GYNECOLOGY | Facility: CLINIC | Age: 30
End: 2020-07-05

## 2020-10-22 ENCOUNTER — PATIENT MESSAGE (OUTPATIENT)
Dept: FAMILY MEDICINE | Facility: CLINIC | Age: 30
End: 2020-10-22

## 2020-10-22 ENCOUNTER — PATIENT MESSAGE (OUTPATIENT)
Dept: OBSTETRICS AND GYNECOLOGY | Facility: CLINIC | Age: 30
End: 2020-10-22

## 2020-10-22 ENCOUNTER — PATIENT MESSAGE (OUTPATIENT)
Dept: NEUROLOGY | Facility: CLINIC | Age: 30
End: 2020-10-22

## 2020-10-22 RX ORDER — QUETIAPINE FUMARATE 200 MG/1
200 TABLET, FILM COATED ORAL DAILY
Qty: 10 TABLET | Refills: 0 | OUTPATIENT
Start: 2020-10-22 | End: 2021-10-22

## 2020-10-22 RX ORDER — LEVETIRACETAM 750 MG/1
1500 TABLET ORAL 2 TIMES DAILY
Qty: 120 TABLET | Refills: 11 | Status: SHIPPED | OUTPATIENT
Start: 2020-10-22 | End: 2021-12-16 | Stop reason: SDUPTHER

## 2020-10-22 RX ORDER — GABAPENTIN 800 MG/1
800 TABLET ORAL 3 TIMES DAILY
Qty: 90 TABLET | Refills: 11 | Status: SHIPPED | OUTPATIENT
Start: 2020-10-22 | End: 2021-01-15 | Stop reason: SDUPTHER

## 2020-10-23 ENCOUNTER — OFFICE VISIT (OUTPATIENT)
Dept: FAMILY MEDICINE | Facility: CLINIC | Age: 30
End: 2020-10-23
Payer: MEDICAID

## 2020-10-23 VITALS
TEMPERATURE: 99 F | SYSTOLIC BLOOD PRESSURE: 100 MMHG | HEART RATE: 88 BPM | DIASTOLIC BLOOD PRESSURE: 64 MMHG | HEIGHT: 64 IN | WEIGHT: 109.63 LBS | BODY MASS INDEX: 18.72 KG/M2

## 2020-10-23 DIAGNOSIS — K21.9 GASTROESOPHAGEAL REFLUX DISEASE, UNSPECIFIED WHETHER ESOPHAGITIS PRESENT: ICD-10-CM

## 2020-10-23 DIAGNOSIS — R51.9 CHRONIC NONINTRACTABLE HEADACHE, UNSPECIFIED HEADACHE TYPE: ICD-10-CM

## 2020-10-23 DIAGNOSIS — G89.29 CHRONIC NONINTRACTABLE HEADACHE, UNSPECIFIED HEADACHE TYPE: ICD-10-CM

## 2020-10-23 DIAGNOSIS — R21 RASH: Primary | ICD-10-CM

## 2020-10-23 PROCEDURE — 99214 OFFICE O/P EST MOD 30 MIN: CPT | Mod: 95,,, | Performed by: NURSE PRACTITIONER

## 2020-10-23 PROCEDURE — 99214 PR OFFICE/OUTPT VISIT, EST, LEVL IV, 30-39 MIN: ICD-10-PCS | Mod: 95,,, | Performed by: NURSE PRACTITIONER

## 2020-10-23 RX ORDER — BETAMETHASONE VALERATE 0.1 %
LOTION (ML) TOPICAL DAILY
Qty: 60 ML | Refills: 1 | Status: SHIPPED | OUTPATIENT
Start: 2020-10-23 | End: 2020-11-12 | Stop reason: SDUPTHER

## 2020-10-23 RX ORDER — MONTELUKAST SODIUM 10 MG/1
10 TABLET ORAL NIGHTLY
COMMUNITY
Start: 2020-09-21 | End: 2021-12-17

## 2020-10-23 RX ORDER — PANTOPRAZOLE SODIUM 40 MG/1
40 TABLET, DELAYED RELEASE ORAL DAILY
Qty: 90 TABLET | Refills: 1 | Status: SHIPPED | OUTPATIENT
Start: 2020-10-23 | End: 2020-11-12 | Stop reason: SDUPTHER

## 2020-10-23 RX ORDER — CIPROFLOXACIN 500 MG/1
TABLET ORAL
COMMUNITY
Start: 2020-10-13 | End: 2021-06-25

## 2020-10-23 RX ORDER — BETAMETHASONE VALERATE 0.1 %
LOTION (ML) TOPICAL
COMMUNITY
Start: 2020-10-01 | End: 2020-10-23 | Stop reason: SDUPTHER

## 2020-10-23 RX ORDER — PANTOPRAZOLE SODIUM 40 MG/1
TABLET, DELAYED RELEASE ORAL
COMMUNITY
Start: 2020-08-18 | End: 2020-10-23 | Stop reason: SDUPTHER

## 2020-10-23 RX ORDER — KETOCONAZOLE 20 MG/ML
SHAMPOO, SUSPENSION TOPICAL
Qty: 120 ML | Refills: 0 | Status: SHIPPED | OUTPATIENT
Start: 2020-10-26 | End: 2020-11-12 | Stop reason: SDUPTHER

## 2020-10-23 RX ORDER — IBUPROFEN 800 MG/1
800 TABLET ORAL 3 TIMES DAILY
COMMUNITY
Start: 2020-08-15 | End: 2021-11-10

## 2020-10-23 RX ORDER — FLUCONAZOLE 150 MG/1
150 TABLET ORAL DAILY
Qty: 10 TABLET | Refills: 0 | Status: SHIPPED | OUTPATIENT
Start: 2020-10-23 | End: 2020-11-02

## 2020-10-23 RX ORDER — ONDANSETRON 4 MG/1
TABLET, ORALLY DISINTEGRATING ORAL
COMMUNITY
Start: 2020-08-02 | End: 2022-03-03

## 2020-10-23 RX ORDER — BUTALBITAL, ACETAMINOPHEN AND CAFFEINE 50; 325; 40 MG/1; MG/1; MG/1
1 TABLET ORAL EVERY 6 HOURS PRN
Qty: 30 TABLET | Refills: 0 | Status: SHIPPED | OUTPATIENT
Start: 2020-10-23 | End: 2021-01-19

## 2020-11-05 NOTE — PROGRESS NOTES
Subjective:       Patient ID: Sara Sanford is a 29 y.o. female.    Chief Complaint: Annual Exam    Rash  This is a recurrent problem. The current episode started more than 1 month ago. The problem has been waxing and waning since onset. The affected locations include the scalp. The rash is characterized by itchiness. She was exposed to nothing. Pertinent negatives include no cough, diarrhea, eye pain, fatigue, fever, shortness of breath, sore throat or vomiting. Past treatments include topical steroids. The treatment provided moderate relief.   Gastroesophageal Reflux  She reports no abdominal pain, no chest pain, no coughing or no sore throat. This is a chronic problem. The problem has been waxing and waning. The symptoms are aggravated by certain foods. Pertinent negatives include no fatigue. She has tried a PPI for the symptoms. The treatment provided significant relief.   Medication Refill  This is a chronic (Fioricet) problem. The current episode started more than 1 year ago. The problem occurs every several days. The problem has been waxing and waning. Associated symptoms include headaches and a rash. Pertinent negatives include no abdominal pain, arthralgias, chest pain, coughing, fatigue, fever, myalgias, sore throat or vomiting. She has tried NSAIDs, acetaminophen and rest for the symptoms. The treatment provided moderate relief.       Review of Systems   Constitutional: Negative for fatigue, fever and unexpected weight change.   HENT: Negative for ear pain and sore throat.    Eyes: Negative for pain and visual disturbance.   Respiratory: Negative for cough and shortness of breath.    Cardiovascular: Negative for chest pain and palpitations.   Gastrointestinal: Negative for abdominal pain, diarrhea and vomiting.   Musculoskeletal: Negative for arthralgias and myalgias.   Skin: Positive for rash. Negative for color change.   Neurological: Positive for headaches. Negative for dizziness.    Psychiatric/Behavioral: Negative for dysphoric mood and sleep disturbance. The patient is not nervous/anxious.        Vitals:    10/23/20 1342   BP: 100/64   Pulse: 88   Temp: 99 °F (37.2 °C)       Objective:     Current Outpatient Medications   Medication Sig Dispense Refill    betamethasone valerate 0.1% (VALISONE) 0.1 % Lotn Apply topically once daily. 60 mL 1    ciprofloxacin HCl (CIPRO) 500 MG tablet       divalproex (DEPAKOTE) 500 MG TbEC Take 1 tablet (500 mg total) by mouth 2 (two) times daily. 60 tablet 2    DULoxetine (CYMBALTA) 60 MG capsule Take 1 capsule (60 mg total) by mouth once daily. 30 capsule 11    gabapentin (NEURONTIN) 800 MG tablet Take 1 tablet (800 mg total) by mouth 3 (three) times daily. 90 tablet 11    ibuprofen (ADVIL,MOTRIN) 800 MG tablet Take 800 mg by mouth 3 (three) times daily.      levETIRAcetam (KEPPRA) 750 MG Tab Take 2 tablets (1,500 mg total) by mouth 2 (two) times daily. 120 tablet 11    montelukast (SINGULAIR) 10 mg tablet Take 10 mg by mouth every evening.      ondansetron (ZOFRAN-ODT) 4 MG TbDL       pantoprazole (PROTONIX) 40 MG tablet Take 1 tablet (40 mg total) by mouth once daily. 90 tablet 1    prenatal vit-iron fum-folic ac 27 mg iron- 0.8 mg Tab Take 1 tablet by mouth once daily. 100 tablet 3    QUEtiapine (SEROQUEL) 200 MG Tab Take 1 tablet (200 mg total) by mouth once daily. 10 tablet 0    buprenorphine (SUBLOCADE) 300 mg/1.5 mL injection       butalbital-acetaminophen-caffeine -40 mg (FIORICET, ESGIC) -40 mg per tablet Take 1 tablet by mouth every 6 (six) hours as needed for Headaches. 30 tablet 0    cholecalciferol, vitamin D3, 5,000 unit capsule TAKE 1 CAPSULE BY MOUTH ONCE A DAY (6AM)  0    ketoconazole (NIZORAL) 2 % shampoo Apply topically twice a week. 120 mL 0    levomefolate calcium (L-METHYLFOLATE) 15 mg Tab       melatonin 10 mg Tab       NARCAN 4 mg/actuation Spry USE 1 spray in nostril AS NEEDED  0     Current  Facility-Administered Medications   Medication Dose Route Frequency Provider Last Rate Last Dose    medroxyPROGESTERone (DEPO-PROVERA) injection 150 mg  150 mg Intramuscular Q90 Days Dung Werner MD           Physical Exam  Vitals signs and nursing note reviewed.   Constitutional:       General: She is not in acute distress.     Appearance: She is well-developed.   HENT:      Head: Normocephalic and atraumatic.   Eyes:      Pupils: Pupils are equal, round, and reactive to light.   Neck:      Musculoskeletal: Normal range of motion and neck supple.   Cardiovascular:      Rate and Rhythm: Normal rate and regular rhythm.   Pulmonary:      Effort: Pulmonary effort is normal.      Breath sounds: Normal breath sounds.   Musculoskeletal: Normal range of motion.   Skin:     General: Skin is warm and dry.      Findings: Rash present. Rash is crusting and scaling.   Neurological:      Mental Status: She is alert and oriented to person, place, and time.   Psychiatric:         Judgment: Judgment normal.         Assessment:       1. Rash    2. Gastroesophageal reflux disease, unspecified whether esophagitis present    3. Chronic nonintractable headache, unspecified headache type        Plan:   Rash    Gastroesophageal reflux disease, unspecified whether esophagitis present    Chronic nonintractable headache, unspecified headache type    Other orders  -     pantoprazole (PROTONIX) 40 MG tablet; Take 1 tablet (40 mg total) by mouth once daily.  Dispense: 90 tablet; Refill: 1  -     butalbital-acetaminophen-caffeine -40 mg (FIORICET, ESGIC) -40 mg per tablet; Take 1 tablet by mouth every 6 (six) hours as needed for Headaches.  Dispense: 30 tablet; Refill: 0  -     fluconazole (DIFLUCAN) 150 MG Tab; Take 1 tablet (150 mg total) by mouth once daily. for 10 days  Dispense: 10 tablet; Refill: 0  -     ketoconazole (NIZORAL) 2 % shampoo; Apply topically twice a week.  Dispense: 120 mL; Refill: 0  -     betamethasone  valerate 0.1% (VALISONE) 0.1 % Lotn; Apply topically once daily.  Dispense: 60 mL; Refill: 1        No follow-ups on file.    There are no Patient Instructions on file for this visit.

## 2020-11-06 ENCOUNTER — TELEPHONE (OUTPATIENT)
Dept: FAMILY MEDICINE | Facility: CLINIC | Age: 30
End: 2020-11-06

## 2020-11-06 NOTE — TELEPHONE ENCOUNTER
----- Message from Yolanda Onofre sent at 11/6/2020 11:13 AM CST -----  Pt grandmother called to have appointment rescheduled please reach out to her at [phone]

## 2020-11-12 ENCOUNTER — PATIENT MESSAGE (OUTPATIENT)
Dept: FAMILY MEDICINE | Facility: CLINIC | Age: 30
End: 2020-11-12

## 2020-11-13 ENCOUNTER — PATIENT MESSAGE (OUTPATIENT)
Dept: FAMILY MEDICINE | Facility: CLINIC | Age: 30
End: 2020-11-13

## 2020-11-13 RX ORDER — PANTOPRAZOLE SODIUM 40 MG/1
40 TABLET, DELAYED RELEASE ORAL DAILY
Qty: 90 TABLET | Refills: 1 | Status: SHIPPED | OUTPATIENT
Start: 2020-11-13 | End: 2021-12-16 | Stop reason: SDUPTHER

## 2020-11-13 RX ORDER — QUETIAPINE FUMARATE 200 MG/1
200 TABLET, FILM COATED ORAL DAILY
Qty: 10 TABLET | Refills: 0 | OUTPATIENT
Start: 2020-11-13 | End: 2021-11-13

## 2020-11-13 RX ORDER — BETAMETHASONE VALERATE 0.1 %
LOTION (ML) TOPICAL DAILY
Qty: 60 ML | Refills: 1 | Status: SHIPPED | OUTPATIENT
Start: 2020-11-13 | End: 2021-11-10

## 2020-11-13 RX ORDER — KETOCONAZOLE 20 MG/ML
SHAMPOO, SUSPENSION TOPICAL
Qty: 120 ML | Refills: 0 | Status: SHIPPED | OUTPATIENT
Start: 2020-11-16 | End: 2021-11-10

## 2020-11-13 RX ORDER — BUTALBITAL, ACETAMINOPHEN AND CAFFEINE 50; 325; 40 MG/1; MG/1; MG/1
1 TABLET ORAL EVERY 6 HOURS PRN
Qty: 30 TABLET | Refills: 0 | OUTPATIENT
Start: 2020-11-13 | End: 2020-12-13

## 2020-11-13 NOTE — TELEPHONE ENCOUNTER
Medication refused due to failing protocol.    Requested Prescriptions   Pending Prescriptions Disp Refills    QUEtiapine (SEROQUEL) 200 MG Tab 10 tablet 0     Sig: Take 1 tablet (200 mg total) by mouth once daily.       There is no refill protocol information for this order

## 2020-11-23 ENCOUNTER — PATIENT MESSAGE (OUTPATIENT)
Dept: FAMILY MEDICINE | Facility: CLINIC | Age: 30
End: 2020-11-23

## 2020-11-23 DIAGNOSIS — F11.29 OPIOID DEPENDENCE WITH OPIOID-INDUCED DISORDER: Primary | Chronic | ICD-10-CM

## 2020-11-23 DIAGNOSIS — R76.8 HEPATITIS C ANTIBODY POSITIVE IN BLOOD: Primary | ICD-10-CM

## 2020-11-23 DIAGNOSIS — Z76.89 REFERRAL OF PATIENT: ICD-10-CM

## 2020-12-02 ENCOUNTER — TELEPHONE (OUTPATIENT)
Dept: DERMATOLOGY | Facility: CLINIC | Age: 30
End: 2020-12-02

## 2020-12-02 NOTE — TELEPHONE ENCOUNTER
----- Message from Zee Joaquin LPN sent at 11/23/2020  1:33 PM CST -----  Regarding: Appointment  Please  help assist us in getting an appointment for this patient for a rash.  Do to her insurance I am not able to set up the appointment.    Sara Sanford  Date of birth 1990  Mrn [id number]    Thank you for assisting with this and Happy Thankgiving  MYRNA Luong Dr.

## 2020-12-02 NOTE — TELEPHONE ENCOUNTER
Pt grandmother was advised that we are not taking new pts in our department. She states understanding and will let pt know. Pt will see Herb Ellison at end of Dec 2020.

## 2020-12-10 ENCOUNTER — TELEPHONE (OUTPATIENT)
Dept: FAMILY MEDICINE | Facility: CLINIC | Age: 30
End: 2020-12-10

## 2020-12-10 NOTE — TELEPHONE ENCOUNTER
----- Message from Alonzo Pitts sent at 12/10/2020  3:09 PM CST -----  Contact: Binta  Type:  Sooner Apoointment Request    Caller is requesting a sooner appointment.  Caller declined first available appointment listed below.  Caller will not accept being placed on the waitlist and is requesting a message be sent to doctor.  Name of Caller:Binta ([email or web address])  When is the first available appointment?2020  Symptoms:appt and labs  Would the patient rather a call back or a response via [facility]? Call [facility]  Best Call Back Number:[phone]  Additional Information:

## 2020-12-16 ENCOUNTER — PATIENT MESSAGE (OUTPATIENT)
Dept: FAMILY MEDICINE | Facility: CLINIC | Age: 30
End: 2020-12-16

## 2020-12-16 ENCOUNTER — PATIENT MESSAGE (OUTPATIENT)
Dept: OBSTETRICS AND GYNECOLOGY | Facility: CLINIC | Age: 30
End: 2020-12-16

## 2021-01-14 ENCOUNTER — PATIENT MESSAGE (OUTPATIENT)
Dept: OBSTETRICS AND GYNECOLOGY | Facility: CLINIC | Age: 31
End: 2021-01-14

## 2021-01-14 ENCOUNTER — PATIENT MESSAGE (OUTPATIENT)
Dept: FAMILY MEDICINE | Facility: CLINIC | Age: 31
End: 2021-01-14

## 2021-01-15 RX ORDER — GABAPENTIN 800 MG/1
800 TABLET ORAL 3 TIMES DAILY
Qty: 90 TABLET | Refills: 0 | Status: SHIPPED | OUTPATIENT
Start: 2021-01-15 | End: 2021-10-20

## 2021-01-21 ENCOUNTER — LAB VISIT (OUTPATIENT)
Dept: LAB | Facility: HOSPITAL | Age: 31
End: 2021-01-21
Attending: FAMILY MEDICINE
Payer: MEDICAID

## 2021-01-21 DIAGNOSIS — R76.8 HEPATITIS C ANTIBODY POSITIVE IN BLOOD: ICD-10-CM

## 2021-01-21 LAB
INR PPP: 0.9 (ref 0.8–1.2)
PROTHROMBIN TIME: 9.7 SEC (ref 9–12.5)

## 2021-01-21 PROCEDURE — 86703 HIV-1/HIV-2 1 RESULT ANTBDY: CPT

## 2021-01-21 PROCEDURE — 86790 VIRUS ANTIBODY NOS: CPT

## 2021-01-21 PROCEDURE — 85610 PROTHROMBIN TIME: CPT

## 2021-01-21 PROCEDURE — 81596 NFCT DS CHRNC HCV 6 ASSAYS: CPT

## 2021-01-21 PROCEDURE — 87340 HEPATITIS B SURFACE AG IA: CPT

## 2021-01-21 PROCEDURE — 82105 ALPHA-FETOPROTEIN SERUM: CPT

## 2021-01-21 PROCEDURE — 86709 HEPATITIS A IGM ANTIBODY: CPT

## 2021-01-21 PROCEDURE — 86706 HEP B SURFACE ANTIBODY: CPT

## 2021-01-22 LAB
AFP SERPL-MCNC: 1.1 NG/ML (ref 0–8.4)
HAV IGM SERPL QL IA: NEGATIVE
HBV SURFACE AB SER-ACNC: POSITIVE M[IU]/ML
HBV SURFACE AG SERPL QL IA: NEGATIVE
HEPATITIS A ANTIBODY, IGG: NEGATIVE
HIV 1+2 AB+HIV1 P24 AG SERPL QL IA: NEGATIVE

## 2021-01-25 LAB
A2 MACROGLOB SERPL-MCNC: 201 MG/DL (ref 100–280)
ALT SERPL W P-5'-P-CCNC: 19 U/L (ref 7–45)
APO A-I SERPL-MCNC: 140 MG/DL
BILIRUB SERPL-MCNC: <0.2 MG/DL
BIOPREDICTIVE SERIAL NUMBER: NORMAL
FIBROSIS STAGE SERPL QL: NORMAL
FIBROTEST INTERPRETATION: NORMAL
FIBROTEST-ACTITEST COMMENT: NORMAL
GGT SERPL-CCNC: 9 U/L (ref 5–36)
HAPTOGLOB SERPL-MCNC: 101 MG/DL (ref 30–200)
HCV GENTYP SERPL NAA+PROBE: NORMAL
HCV RNA SERPL NAA+PROBE-LOG IU: <1.08 LOG (10) IU/ML
HCV RNA SERPL QL NAA+PROBE: NOT DETECTED
HCV RNA SPEC NAA+PROBE-ACNC: <12 IU/ML
LIVER FIBR SCORE SERPL CALC.FIBROSURE: 0.04
NECROINFLAMMAT INTERP: NORMAL
NECROINFLAMMATORY ACT GRADE SERPL QL: NORMAL
NECROINFLAMMATORY ACT SCORE SERPL: 0.05

## 2021-01-26 ENCOUNTER — PATIENT MESSAGE (OUTPATIENT)
Dept: FAMILY MEDICINE | Facility: CLINIC | Age: 31
End: 2021-01-26

## 2021-03-26 ENCOUNTER — PATIENT OUTREACH (OUTPATIENT)
Dept: ADMINISTRATIVE | Facility: OTHER | Age: 31
End: 2021-03-26

## 2021-04-18 ENCOUNTER — PATIENT MESSAGE (OUTPATIENT)
Dept: OBSTETRICS AND GYNECOLOGY | Facility: CLINIC | Age: 31
End: 2021-04-18

## 2021-04-19 ENCOUNTER — TELEPHONE (OUTPATIENT)
Dept: NEUROLOGY | Facility: CLINIC | Age: 31
End: 2021-04-19

## 2021-04-19 ENCOUNTER — PATIENT MESSAGE (OUTPATIENT)
Dept: FAMILY MEDICINE | Facility: CLINIC | Age: 31
End: 2021-04-19

## 2021-04-19 RX ORDER — BUTALBITAL, ACETAMINOPHEN AND CAFFEINE 50; 325; 40 MG/1; MG/1; MG/1
TABLET ORAL
Qty: 30 TABLET | Refills: 0 | OUTPATIENT
Start: 2021-04-19

## 2021-04-19 RX ORDER — QUETIAPINE FUMARATE 200 MG/1
200 TABLET, FILM COATED ORAL DAILY
Qty: 10 TABLET | Refills: 0 | OUTPATIENT
Start: 2021-04-19 | End: 2022-04-19

## 2021-04-27 ENCOUNTER — PATIENT MESSAGE (OUTPATIENT)
Dept: OBSTETRICS AND GYNECOLOGY | Facility: CLINIC | Age: 31
End: 2021-04-27

## 2021-05-03 ENCOUNTER — PATIENT OUTREACH (OUTPATIENT)
Dept: ADMINISTRATIVE | Facility: OTHER | Age: 31
End: 2021-05-03

## 2021-05-10 ENCOUNTER — PATIENT MESSAGE (OUTPATIENT)
Dept: OBSTETRICS AND GYNECOLOGY | Facility: CLINIC | Age: 31
End: 2021-05-10

## 2021-05-17 ENCOUNTER — PATIENT MESSAGE (OUTPATIENT)
Dept: OBSTETRICS AND GYNECOLOGY | Facility: CLINIC | Age: 31
End: 2021-05-17

## 2021-05-18 RX ORDER — GABAPENTIN 800 MG/1
800 TABLET ORAL 3 TIMES DAILY
Qty: 90 TABLET | Refills: 0 | OUTPATIENT
Start: 2021-05-18

## 2021-05-19 ENCOUNTER — PATIENT MESSAGE (OUTPATIENT)
Dept: FAMILY MEDICINE | Facility: CLINIC | Age: 31
End: 2021-05-19

## 2021-05-19 RX ORDER — BUTALBITAL, ACETAMINOPHEN AND CAFFEINE 50; 325; 40 MG/1; MG/1; MG/1
1 TABLET ORAL EVERY 6 HOURS PRN
Qty: 30 TABLET | Refills: 0 | Status: SHIPPED | OUTPATIENT
Start: 2021-05-19 | End: 2021-06-25 | Stop reason: SDUPTHER

## 2021-05-26 ENCOUNTER — PATIENT MESSAGE (OUTPATIENT)
Dept: OBSTETRICS AND GYNECOLOGY | Facility: CLINIC | Age: 31
End: 2021-05-26

## 2021-05-26 ENCOUNTER — OFFICE VISIT (OUTPATIENT)
Dept: OBSTETRICS AND GYNECOLOGY | Facility: CLINIC | Age: 31
End: 2021-05-26
Payer: MEDICAID

## 2021-05-26 VITALS
HEIGHT: 64 IN | DIASTOLIC BLOOD PRESSURE: 78 MMHG | BODY MASS INDEX: 19.76 KG/M2 | SYSTOLIC BLOOD PRESSURE: 119 MMHG | WEIGHT: 115.75 LBS

## 2021-05-26 DIAGNOSIS — N91.0 DELAYED MENSES: ICD-10-CM

## 2021-05-26 DIAGNOSIS — Z01.419 GYNECOLOGIC EXAM NORMAL: ICD-10-CM

## 2021-05-26 DIAGNOSIS — Z11.3 SCREENING EXAMINATION FOR STD (SEXUALLY TRANSMITTED DISEASE): ICD-10-CM

## 2021-05-26 DIAGNOSIS — Z34.90 EARLY STAGE OF PREGNANCY: Primary | ICD-10-CM

## 2021-05-26 LAB
B-HCG UR QL: NEGATIVE
CTP QC/QA: YES

## 2021-05-26 PROCEDURE — 99999 PR PBB SHADOW E&M-EST. PATIENT-LVL IV: ICD-10-PCS | Mod: PBBFAC,,, | Performed by: SPECIALIST

## 2021-05-26 PROCEDURE — 88141 CYTOPATH C/V INTERPRET: CPT | Mod: ,,, | Performed by: PATHOLOGY

## 2021-05-26 PROCEDURE — 87591 N.GONORRHOEAE DNA AMP PROB: CPT | Performed by: SPECIALIST

## 2021-05-26 PROCEDURE — 88141 PR  CYTOPATH CERV/VAG INTERPRET: ICD-10-PCS | Mod: ,,, | Performed by: PATHOLOGY

## 2021-05-26 PROCEDURE — 76817 PR US, OB, TRANSVAG APPROACH: ICD-10-PCS | Mod: 26,S$PBB,, | Performed by: SPECIALIST

## 2021-05-26 PROCEDURE — 87491 CHLMYD TRACH DNA AMP PROBE: CPT | Performed by: SPECIALIST

## 2021-05-26 PROCEDURE — 81025 URINE PREGNANCY TEST: CPT | Mod: PBBFAC,PN | Performed by: SPECIALIST

## 2021-05-26 PROCEDURE — 99214 OFFICE O/P EST MOD 30 MIN: CPT | Mod: PBBFAC,TH,PN | Performed by: SPECIALIST

## 2021-05-26 PROCEDURE — 87624 HPV HI-RISK TYP POOLED RSLT: CPT | Performed by: SPECIALIST

## 2021-05-26 PROCEDURE — 99204 PR OFFICE/OUTPT VISIT, NEW, LEVL IV, 45-59 MIN: ICD-10-PCS | Mod: 25,S$PBB,TH, | Performed by: SPECIALIST

## 2021-05-26 PROCEDURE — 76817 TRANSVAGINAL US OBSTETRIC: CPT | Mod: 26,S$PBB,, | Performed by: SPECIALIST

## 2021-05-26 PROCEDURE — 99999 PR PBB SHADOW E&M-EST. PATIENT-LVL IV: CPT | Mod: PBBFAC,,, | Performed by: SPECIALIST

## 2021-05-26 PROCEDURE — 99204 OFFICE O/P NEW MOD 45 MIN: CPT | Mod: 25,S$PBB,TH, | Performed by: SPECIALIST

## 2021-05-26 PROCEDURE — 88175 CYTOPATH C/V AUTO FLUID REDO: CPT | Performed by: PATHOLOGY

## 2021-05-26 PROCEDURE — 76817 TRANSVAGINAL US OBSTETRIC: CPT | Mod: PBBFAC,PN | Performed by: SPECIALIST

## 2021-05-27 ENCOUNTER — PATIENT MESSAGE (OUTPATIENT)
Dept: OBSTETRICS AND GYNECOLOGY | Facility: CLINIC | Age: 31
End: 2021-05-27

## 2021-05-28 DIAGNOSIS — N91.0 DELAYED MENSES: Primary | ICD-10-CM

## 2021-05-28 LAB
C TRACH DNA SPEC QL NAA+PROBE: NOT DETECTED
N GONORRHOEA DNA SPEC QL NAA+PROBE: NOT DETECTED

## 2021-05-29 RX ORDER — BUTALBITAL, ACETAMINOPHEN AND CAFFEINE 50; 325; 40 MG/1; MG/1; MG/1
1 TABLET ORAL EVERY 6 HOURS PRN
Qty: 30 TABLET | Refills: 0 | Status: CANCELLED | OUTPATIENT
Start: 2021-05-29

## 2021-06-01 ENCOUNTER — PATIENT MESSAGE (OUTPATIENT)
Dept: FAMILY MEDICINE | Facility: CLINIC | Age: 31
End: 2021-06-01

## 2021-06-01 LAB
FINAL PATHOLOGIC DIAGNOSIS: ABNORMAL
HPV HR 12 DNA SPEC QL NAA+PROBE: NEGATIVE
HPV16 AG SPEC QL: NEGATIVE
HPV18 DNA SPEC QL NAA+PROBE: NEGATIVE
Lab: ABNORMAL

## 2021-06-08 ENCOUNTER — TELEPHONE (OUTPATIENT)
Dept: FAMILY MEDICINE | Facility: CLINIC | Age: 31
End: 2021-06-08

## 2021-06-08 ENCOUNTER — TELEPHONE (OUTPATIENT)
Dept: NEUROLOGY | Facility: CLINIC | Age: 31
End: 2021-06-08

## 2021-06-08 RX ORDER — BUTALBITAL, ACETAMINOPHEN AND CAFFEINE 50; 325; 40 MG/1; MG/1; MG/1
TABLET ORAL
Qty: 30 TABLET | Refills: 0 | OUTPATIENT
Start: 2021-06-08

## 2021-06-11 ENCOUNTER — TELEPHONE (OUTPATIENT)
Dept: NEUROLOGY | Facility: CLINIC | Age: 31
End: 2021-06-11

## 2021-06-24 ENCOUNTER — PATIENT MESSAGE (OUTPATIENT)
Dept: FAMILY MEDICINE | Facility: CLINIC | Age: 31
End: 2021-06-24

## 2021-06-24 RX ORDER — BUTALBITAL, ACETAMINOPHEN AND CAFFEINE 50; 325; 40 MG/1; MG/1; MG/1
1 TABLET ORAL EVERY 6 HOURS PRN
Qty: 30 TABLET | Refills: 0 | Status: CANCELLED | OUTPATIENT
Start: 2021-06-24

## 2021-06-24 RX ORDER — GABAPENTIN 800 MG/1
800 TABLET ORAL 3 TIMES DAILY
Qty: 90 TABLET | Refills: 0 | OUTPATIENT
Start: 2021-06-24

## 2021-06-25 ENCOUNTER — OFFICE VISIT (OUTPATIENT)
Dept: FAMILY MEDICINE | Facility: CLINIC | Age: 31
End: 2021-06-25
Payer: MEDICAID

## 2021-06-25 DIAGNOSIS — M54.50 CHRONIC BILATERAL LOW BACK PAIN WITHOUT SCIATICA: ICD-10-CM

## 2021-06-25 DIAGNOSIS — F32.A ANXIETY AND DEPRESSION: Primary | ICD-10-CM

## 2021-06-25 DIAGNOSIS — G89.29 CHRONIC BILATERAL LOW BACK PAIN WITHOUT SCIATICA: ICD-10-CM

## 2021-06-25 DIAGNOSIS — F41.9 ANXIETY AND DEPRESSION: Primary | ICD-10-CM

## 2021-06-25 PROCEDURE — 99214 PR OFFICE/OUTPT VISIT, EST, LEVL IV, 30-39 MIN: ICD-10-PCS | Mod: 95,,, | Performed by: NURSE PRACTITIONER

## 2021-06-25 PROCEDURE — 99214 OFFICE O/P EST MOD 30 MIN: CPT | Mod: 95,,, | Performed by: NURSE PRACTITIONER

## 2021-06-25 RX ORDER — BUTALBITAL, ACETAMINOPHEN AND CAFFEINE 50; 325; 40 MG/1; MG/1; MG/1
1 TABLET ORAL EVERY 6 HOURS PRN
Qty: 20 TABLET | Refills: 0 | Status: SHIPPED | OUTPATIENT
Start: 2021-06-25 | End: 2021-07-09 | Stop reason: SDUPTHER

## 2021-06-25 RX ORDER — DULOXETIN HYDROCHLORIDE 30 MG/1
30 CAPSULE, DELAYED RELEASE ORAL DAILY
Qty: 30 CAPSULE | Refills: 1 | Status: SHIPPED | OUTPATIENT
Start: 2021-06-25 | End: 2021-08-13

## 2021-07-08 ENCOUNTER — PATIENT MESSAGE (OUTPATIENT)
Dept: PSYCHIATRY | Facility: CLINIC | Age: 31
End: 2021-07-08

## 2021-07-09 ENCOUNTER — PATIENT MESSAGE (OUTPATIENT)
Dept: FAMILY MEDICINE | Facility: CLINIC | Age: 31
End: 2021-07-09

## 2021-07-09 DIAGNOSIS — G89.29 CHRONIC NONINTRACTABLE HEADACHE, UNSPECIFIED HEADACHE TYPE: Primary | ICD-10-CM

## 2021-07-09 DIAGNOSIS — R51.9 CHRONIC NONINTRACTABLE HEADACHE, UNSPECIFIED HEADACHE TYPE: Primary | ICD-10-CM

## 2021-07-09 RX ORDER — BUTALBITAL, ACETAMINOPHEN AND CAFFEINE 50; 325; 40 MG/1; MG/1; MG/1
1 TABLET ORAL EVERY 6 HOURS PRN
Qty: 20 TABLET | Refills: 0 | Status: SHIPPED | OUTPATIENT
Start: 2021-07-09 | End: 2021-09-07 | Stop reason: SDUPTHER

## 2021-07-13 ENCOUNTER — PATIENT OUTREACH (OUTPATIENT)
Dept: ADMINISTRATIVE | Facility: OTHER | Age: 31
End: 2021-07-13

## 2021-08-04 DIAGNOSIS — G89.29 CHRONIC NONINTRACTABLE HEADACHE, UNSPECIFIED HEADACHE TYPE: ICD-10-CM

## 2021-08-04 DIAGNOSIS — R51.9 CHRONIC NONINTRACTABLE HEADACHE, UNSPECIFIED HEADACHE TYPE: ICD-10-CM

## 2021-08-05 RX ORDER — BUTALBITAL, ACETAMINOPHEN AND CAFFEINE 50; 325; 40 MG/1; MG/1; MG/1
TABLET ORAL
Qty: 20 TABLET | Refills: 0 | OUTPATIENT
Start: 2021-08-05

## 2021-08-09 ENCOUNTER — TELEPHONE (OUTPATIENT)
Dept: NEUROLOGY | Facility: CLINIC | Age: 31
End: 2021-08-09

## 2021-08-10 ENCOUNTER — TELEPHONE (OUTPATIENT)
Dept: NEUROLOGY | Facility: CLINIC | Age: 31
End: 2021-08-10

## 2021-08-13 ENCOUNTER — PATIENT MESSAGE (OUTPATIENT)
Dept: FAMILY MEDICINE | Facility: CLINIC | Age: 31
End: 2021-08-13

## 2021-08-18 ENCOUNTER — PATIENT MESSAGE (OUTPATIENT)
Dept: OBSTETRICS AND GYNECOLOGY | Facility: CLINIC | Age: 31
End: 2021-08-18

## 2021-08-19 ENCOUNTER — PATIENT MESSAGE (OUTPATIENT)
Dept: OBSTETRICS AND GYNECOLOGY | Facility: CLINIC | Age: 31
End: 2021-08-19

## 2021-08-19 ENCOUNTER — OFFICE VISIT (OUTPATIENT)
Dept: OBSTETRICS AND GYNECOLOGY | Facility: CLINIC | Age: 31
End: 2021-08-19
Payer: MEDICAID

## 2021-08-19 VITALS
SYSTOLIC BLOOD PRESSURE: 102 MMHG | WEIGHT: 112.19 LBS | HEIGHT: 64 IN | RESPIRATION RATE: 16 BRPM | DIASTOLIC BLOOD PRESSURE: 64 MMHG | BODY MASS INDEX: 19.15 KG/M2

## 2021-08-19 DIAGNOSIS — Z01.419 ENCOUNTER FOR ROUTINE GYNECOLOGICAL EXAMINATION WITH PAPANICOLAOU SMEAR OF CERVIX: Primary | ICD-10-CM

## 2021-08-19 DIAGNOSIS — F15.20 METHAMPHETAMINE ADDICTION: ICD-10-CM

## 2021-08-19 DIAGNOSIS — Z32.00 POSSIBLE PREGNANCY: ICD-10-CM

## 2021-08-19 LAB
AMPHET+METHAMPHET UR QL: ABNORMAL
B-HCG UR QL: POSITIVE
BARBITURATES UR QL SCN>200 NG/ML: ABNORMAL
BENZODIAZ UR QL SCN>200 NG/ML: NEGATIVE
BZE UR QL SCN: NEGATIVE
CANNABINOIDS UR QL SCN: NEGATIVE
CREAT UR-MCNC: 129 MG/DL (ref 15–325)
CTP QC/QA: YES
ETHANOL UR-MCNC: <10 MG/DL
METHADONE UR QL SCN>300 NG/ML: NEGATIVE
OPIATES UR QL SCN: NEGATIVE
PCP UR QL SCN>25 NG/ML: NEGATIVE
TOXICOLOGY INFORMATION: ABNORMAL

## 2021-08-19 PROCEDURE — 87491 CHLMYD TRACH DNA AMP PROBE: CPT | Performed by: SPECIALIST

## 2021-08-19 PROCEDURE — 76817 TRANSVAGINAL US OBSTETRIC: CPT | Mod: PBBFAC,PN | Performed by: SPECIALIST

## 2021-08-19 PROCEDURE — 81025 URINE PREGNANCY TEST: CPT | Mod: PBBFAC,PN | Performed by: SPECIALIST

## 2021-08-19 PROCEDURE — 99215 OFFICE O/P EST HI 40 MIN: CPT | Mod: PBBFAC,PN | Performed by: SPECIALIST

## 2021-08-19 PROCEDURE — 88141 PR  CYTOPATH CERV/VAG INTERPRET: ICD-10-PCS | Mod: ,,, | Performed by: PATHOLOGY

## 2021-08-19 PROCEDURE — 88175 CYTOPATH C/V AUTO FLUID REDO: CPT | Performed by: PATHOLOGY

## 2021-08-19 PROCEDURE — 76817 PR US, OB, TRANSVAG APPROACH: ICD-10-PCS | Mod: 26,S$PBB,, | Performed by: SPECIALIST

## 2021-08-19 PROCEDURE — 99999 PR PBB SHADOW E&M-EST. PATIENT-LVL V: CPT | Mod: PBBFAC,,, | Performed by: SPECIALIST

## 2021-08-19 PROCEDURE — 88141 CYTOPATH C/V INTERPRET: CPT | Mod: ,,, | Performed by: PATHOLOGY

## 2021-08-19 PROCEDURE — 88142 CYTOPATH C/V THIN LAYER: CPT | Performed by: PATHOLOGY

## 2021-08-19 PROCEDURE — 87624 HPV HI-RISK TYP POOLED RSLT: CPT | Performed by: SPECIALIST

## 2021-08-19 PROCEDURE — 76817 TRANSVAGINAL US OBSTETRIC: CPT | Mod: 26,S$PBB,, | Performed by: SPECIALIST

## 2021-08-19 PROCEDURE — 99999 PR PBB SHADOW E&M-EST. PATIENT-LVL V: ICD-10-PCS | Mod: PBBFAC,,, | Performed by: SPECIALIST

## 2021-08-19 PROCEDURE — 99214 PR OFFICE/OUTPT VISIT, EST, LEVL IV, 30-39 MIN: ICD-10-PCS | Mod: TH,25,S$PBB, | Performed by: SPECIALIST

## 2021-08-19 PROCEDURE — 87591 N.GONORRHOEAE DNA AMP PROB: CPT | Performed by: SPECIALIST

## 2021-08-19 PROCEDURE — 99214 OFFICE O/P EST MOD 30 MIN: CPT | Mod: TH,25,S$PBB, | Performed by: SPECIALIST

## 2021-08-19 PROCEDURE — 80307 DRUG TEST PRSMV CHEM ANLYZR: CPT | Performed by: SPECIALIST

## 2021-08-20 ENCOUNTER — PATIENT MESSAGE (OUTPATIENT)
Dept: OBSTETRICS AND GYNECOLOGY | Facility: CLINIC | Age: 31
End: 2021-08-20

## 2021-08-21 LAB
C TRACH DNA SPEC QL NAA+PROBE: NOT DETECTED
N GONORRHOEA DNA SPEC QL NAA+PROBE: NOT DETECTED

## 2021-09-03 ENCOUNTER — PATIENT MESSAGE (OUTPATIENT)
Dept: FAMILY MEDICINE | Facility: CLINIC | Age: 31
End: 2021-09-03

## 2021-09-03 ENCOUNTER — PATIENT MESSAGE (OUTPATIENT)
Dept: OBSTETRICS AND GYNECOLOGY | Facility: CLINIC | Age: 31
End: 2021-09-03

## 2021-09-03 DIAGNOSIS — R51.9 CHRONIC NONINTRACTABLE HEADACHE, UNSPECIFIED HEADACHE TYPE: ICD-10-CM

## 2021-09-03 DIAGNOSIS — G89.29 CHRONIC NONINTRACTABLE HEADACHE, UNSPECIFIED HEADACHE TYPE: ICD-10-CM

## 2021-09-07 RX ORDER — BUTALBITAL, ACETAMINOPHEN AND CAFFEINE 50; 325; 40 MG/1; MG/1; MG/1
1 TABLET ORAL EVERY 6 HOURS PRN
Qty: 10 TABLET | Refills: 0 | Status: ON HOLD | OUTPATIENT
Start: 2021-09-07 | End: 2022-04-10 | Stop reason: HOSPADM

## 2021-09-08 ENCOUNTER — PATIENT MESSAGE (OUTPATIENT)
Dept: OBSTETRICS AND GYNECOLOGY | Facility: CLINIC | Age: 31
End: 2021-09-08

## 2021-09-09 ENCOUNTER — PATIENT MESSAGE (OUTPATIENT)
Dept: OBSTETRICS AND GYNECOLOGY | Facility: CLINIC | Age: 31
End: 2021-09-09

## 2021-09-13 LAB
FINAL PATHOLOGIC DIAGNOSIS: ABNORMAL
Lab: ABNORMAL

## 2021-09-14 ENCOUNTER — PATIENT MESSAGE (OUTPATIENT)
Dept: OBSTETRICS AND GYNECOLOGY | Facility: CLINIC | Age: 31
End: 2021-09-14

## 2021-09-17 ENCOUNTER — PATIENT MESSAGE (OUTPATIENT)
Dept: OBSTETRICS AND GYNECOLOGY | Facility: CLINIC | Age: 31
End: 2021-09-17

## 2021-09-20 ENCOUNTER — PATIENT MESSAGE (OUTPATIENT)
Dept: FAMILY MEDICINE | Facility: CLINIC | Age: 31
End: 2021-09-20

## 2021-09-27 ENCOUNTER — PATIENT MESSAGE (OUTPATIENT)
Dept: OBSTETRICS AND GYNECOLOGY | Facility: CLINIC | Age: 31
End: 2021-09-27

## 2021-09-28 ENCOUNTER — PATIENT MESSAGE (OUTPATIENT)
Dept: GENETICS | Facility: CLINIC | Age: 31
End: 2021-09-28

## 2021-09-28 RX ORDER — PNV 112/IRON/FOLIC/OM3/DHA/EPA 3.33-.33MG
TABLET,CHEWABLE ORAL
Qty: 90 TABLET | Refills: 3 | Status: SHIPPED | OUTPATIENT
Start: 2021-09-28 | End: 2021-10-11

## 2021-09-29 ENCOUNTER — PATIENT MESSAGE (OUTPATIENT)
Dept: GENETICS | Facility: CLINIC | Age: 31
End: 2021-09-29

## 2021-10-18 ENCOUNTER — TELEPHONE (OUTPATIENT)
Dept: OBSTETRICS AND GYNECOLOGY | Facility: CLINIC | Age: 31
End: 2021-10-18

## 2021-10-19 ENCOUNTER — LAB VISIT (OUTPATIENT)
Dept: LAB | Facility: HOSPITAL | Age: 31
End: 2021-10-19
Attending: SPECIALIST
Payer: MEDICAID

## 2021-10-19 DIAGNOSIS — Z32.00 POSSIBLE PREGNANCY: ICD-10-CM

## 2021-10-19 LAB
HCG INTACT+B SERPL-ACNC: NORMAL MIU/ML
PROGEST SERPL-MCNC: 23.3 NG/ML

## 2021-10-19 PROCEDURE — 36415 COLL VENOUS BLD VENIPUNCTURE: CPT | Mod: PO | Performed by: SPECIALIST

## 2021-10-19 PROCEDURE — 84702 CHORIONIC GONADOTROPIN TEST: CPT | Performed by: SPECIALIST

## 2021-10-19 PROCEDURE — 84144 ASSAY OF PROGESTERONE: CPT | Performed by: SPECIALIST

## 2021-10-20 ENCOUNTER — TELEPHONE (OUTPATIENT)
Dept: NEUROLOGY | Facility: CLINIC | Age: 31
End: 2021-10-20

## 2021-10-20 ENCOUNTER — OFFICE VISIT (OUTPATIENT)
Dept: NEUROLOGY | Facility: CLINIC | Age: 31
End: 2021-10-20
Payer: MEDICAID

## 2021-10-20 VITALS
SYSTOLIC BLOOD PRESSURE: 122 MMHG | BODY MASS INDEX: 20.82 KG/M2 | WEIGHT: 121.94 LBS | HEIGHT: 64 IN | HEART RATE: 100 BPM | DIASTOLIC BLOOD PRESSURE: 79 MMHG

## 2021-10-20 DIAGNOSIS — Z87.898 HISTORY OF SEIZURE: Primary | ICD-10-CM

## 2021-10-20 DIAGNOSIS — F41.9 ANXIETY: ICD-10-CM

## 2021-10-20 PROCEDURE — 99215 PR OFFICE/OUTPT VISIT, EST, LEVL V, 40-54 MIN: ICD-10-PCS | Mod: S$PBB,,, | Performed by: PSYCHIATRY & NEUROLOGY

## 2021-10-20 PROCEDURE — 99999 PR PBB SHADOW E&M-EST. PATIENT-LVL V: CPT | Mod: PBBFAC,,, | Performed by: PSYCHIATRY & NEUROLOGY

## 2021-10-20 PROCEDURE — 99215 OFFICE O/P EST HI 40 MIN: CPT | Mod: PBBFAC,PO | Performed by: PSYCHIATRY & NEUROLOGY

## 2021-10-20 PROCEDURE — 99999 PR PBB SHADOW E&M-EST. PATIENT-LVL V: ICD-10-PCS | Mod: PBBFAC,,, | Performed by: PSYCHIATRY & NEUROLOGY

## 2021-10-20 PROCEDURE — 99215 OFFICE O/P EST HI 40 MIN: CPT | Mod: S$PBB,,, | Performed by: PSYCHIATRY & NEUROLOGY

## 2021-10-20 RX ORDER — RIBOFLAVIN (VITAMIN B2) 400 MG
400 TABLET ORAL DAILY
Qty: 30 TABLET | Refills: 5 | Status: SHIPPED | OUTPATIENT
Start: 2021-10-20 | End: 2021-11-05 | Stop reason: SDUPTHER

## 2021-10-20 RX ORDER — LANOLIN ALCOHOL/MO/W.PET/CERES
400 CREAM (GRAM) TOPICAL DAILY
Qty: 30 TABLET | Refills: 5 | Status: SHIPPED | OUTPATIENT
Start: 2021-10-20 | End: 2021-11-30 | Stop reason: SDUPTHER

## 2021-10-24 ENCOUNTER — PATIENT MESSAGE (OUTPATIENT)
Dept: OBSTETRICS AND GYNECOLOGY | Facility: CLINIC | Age: 31
End: 2021-10-24
Payer: MEDICAID

## 2021-10-25 ENCOUNTER — PATIENT MESSAGE (OUTPATIENT)
Dept: OBSTETRICS AND GYNECOLOGY | Facility: CLINIC | Age: 31
End: 2021-10-25
Payer: MEDICAID

## 2021-10-25 DIAGNOSIS — Z34.90 PREGNANCY, UNSPECIFIED GESTATIONAL AGE: Primary | ICD-10-CM

## 2021-10-27 ENCOUNTER — PATIENT MESSAGE (OUTPATIENT)
Dept: GENETICS | Facility: CLINIC | Age: 31
End: 2021-10-27
Payer: MEDICAID

## 2021-11-01 ENCOUNTER — TELEPHONE (OUTPATIENT)
Dept: OBSTETRICS AND GYNECOLOGY | Facility: CLINIC | Age: 31
End: 2021-11-01
Payer: MEDICAID

## 2021-11-02 ENCOUNTER — ROUTINE PRENATAL (OUTPATIENT)
Dept: OBSTETRICS AND GYNECOLOGY | Facility: CLINIC | Age: 31
End: 2021-11-02
Payer: MEDICAID

## 2021-11-02 ENCOUNTER — LAB VISIT (OUTPATIENT)
Dept: LAB | Facility: HOSPITAL | Age: 31
End: 2021-11-02
Attending: SPECIALIST
Payer: MEDICAID

## 2021-11-02 VITALS — BODY MASS INDEX: 20.4 KG/M2 | SYSTOLIC BLOOD PRESSURE: 110 MMHG | DIASTOLIC BLOOD PRESSURE: 66 MMHG | WEIGHT: 118.81 LBS

## 2021-11-02 DIAGNOSIS — Z3A.15 15 WEEKS GESTATION OF PREGNANCY: Primary | ICD-10-CM

## 2021-11-02 DIAGNOSIS — Z3A.15 15 WEEKS GESTATION OF PREGNANCY: ICD-10-CM

## 2021-11-02 PROCEDURE — 99215 OFFICE O/P EST HI 40 MIN: CPT | Mod: 25,TH,S$PBB, | Performed by: SPECIALIST

## 2021-11-02 PROCEDURE — 99215 PR OFFICE/OUTPT VISIT, EST, LEVL V, 40-54 MIN: ICD-10-PCS | Mod: 25,TH,S$PBB, | Performed by: SPECIALIST

## 2021-11-02 PROCEDURE — 80307 DRUG TEST PRSMV CHEM ANLYZR: CPT | Performed by: SPECIALIST

## 2021-11-02 PROCEDURE — 76817 TRANSVAGINAL US OBSTETRIC: CPT | Mod: PBBFAC,PN | Performed by: SPECIALIST

## 2021-11-02 PROCEDURE — 76817 TRANSVAGINAL US OBSTETRIC: CPT | Mod: 26,S$PBB,, | Performed by: SPECIALIST

## 2021-11-02 PROCEDURE — 86592 SYPHILIS TEST NON-TREP QUAL: CPT | Performed by: SPECIALIST

## 2021-11-02 PROCEDURE — 99213 OFFICE O/P EST LOW 20 MIN: CPT | Mod: PBBFAC,PN | Performed by: SPECIALIST

## 2021-11-02 PROCEDURE — 99999 PR PBB SHADOW E&M-EST. PATIENT-LVL III: CPT | Mod: PBBFAC,,, | Performed by: SPECIALIST

## 2021-11-02 PROCEDURE — 85025 COMPLETE CBC W/AUTO DIFF WBC: CPT | Performed by: SPECIALIST

## 2021-11-02 PROCEDURE — 87340 HEPATITIS B SURFACE AG IA: CPT | Performed by: SPECIALIST

## 2021-11-02 PROCEDURE — 86900 BLOOD TYPING SEROLOGIC ABO: CPT | Performed by: SPECIALIST

## 2021-11-02 PROCEDURE — 36415 COLL VENOUS BLD VENIPUNCTURE: CPT | Mod: PN | Performed by: SPECIALIST

## 2021-11-02 PROCEDURE — 81220 CFTR GENE COM VARIANTS: CPT | Performed by: SPECIALIST

## 2021-11-02 PROCEDURE — 87389 HIV-1 AG W/HIV-1&-2 AB AG IA: CPT | Performed by: SPECIALIST

## 2021-11-02 PROCEDURE — 84443 ASSAY THYROID STIM HORMONE: CPT | Performed by: SPECIALIST

## 2021-11-02 PROCEDURE — 86762 RUBELLA ANTIBODY: CPT | Performed by: SPECIALIST

## 2021-11-02 PROCEDURE — 99999 PR PBB SHADOW E&M-EST. PATIENT-LVL III: ICD-10-PCS | Mod: PBBFAC,,, | Performed by: SPECIALIST

## 2021-11-02 PROCEDURE — 81511 FTL CGEN ABNOR FOUR ANAL: CPT | Performed by: SPECIALIST

## 2021-11-02 PROCEDURE — 76817 PR US, OB, TRANSVAG APPROACH: ICD-10-PCS | Mod: 26,S$PBB,, | Performed by: SPECIALIST

## 2021-11-03 ENCOUNTER — PATIENT MESSAGE (OUTPATIENT)
Dept: NEUROLOGY | Facility: CLINIC | Age: 31
End: 2021-11-03
Payer: MEDICAID

## 2021-11-03 LAB
ABO + RH BLD: NORMAL
AMPHET+METHAMPHET UR QL: ABNORMAL
BARBITURATES UR QL SCN>200 NG/ML: NEGATIVE
BASOPHILS # BLD AUTO: 0.02 K/UL (ref 0–0.2)
BASOPHILS NFR BLD: 0.4 % (ref 0–1.9)
BENZODIAZ UR QL SCN>200 NG/ML: NEGATIVE
BLD GP AB SCN CELLS X3 SERPL QL: NORMAL
BZE UR QL SCN: NEGATIVE
CANNABINOIDS UR QL SCN: NEGATIVE
CREAT UR-MCNC: 136 MG/DL (ref 15–325)
DIFFERENTIAL METHOD: ABNORMAL
EOSINOPHIL # BLD AUTO: 0.2 K/UL (ref 0–0.5)
EOSINOPHIL NFR BLD: 2.8 % (ref 0–8)
ERYTHROCYTE [DISTWIDTH] IN BLOOD BY AUTOMATED COUNT: 12.6 % (ref 11.5–14.5)
ETHANOL UR-MCNC: <10 MG/DL
HBV SURFACE AG SERPL QL IA: NEGATIVE
HCT VFR BLD AUTO: 35.5 % (ref 37–48.5)
HGB BLD-MCNC: 12.5 G/DL (ref 12–16)
HIV 1+2 AB+HIV1 P24 AG SERPL QL IA: NEGATIVE
IMM GRANULOCYTES # BLD AUTO: 0.02 K/UL (ref 0–0.04)
IMM GRANULOCYTES NFR BLD AUTO: 0.4 % (ref 0–0.5)
LYMPHOCYTES # BLD AUTO: 1 K/UL (ref 1–4.8)
LYMPHOCYTES NFR BLD: 18.1 % (ref 18–48)
MCH RBC QN AUTO: 31.3 PG (ref 27–31)
MCHC RBC AUTO-ENTMCNC: 35.2 G/DL (ref 32–36)
MCV RBC AUTO: 89 FL (ref 82–98)
METHADONE UR QL SCN>300 NG/ML: NEGATIVE
MONOCYTES # BLD AUTO: 0.4 K/UL (ref 0.3–1)
MONOCYTES NFR BLD: 7.8 % (ref 4–15)
NEUTROPHILS # BLD AUTO: 4 K/UL (ref 1.8–7.7)
NEUTROPHILS NFR BLD: 70.5 % (ref 38–73)
NRBC BLD-RTO: 0 /100 WBC
OPIATES UR QL SCN: NEGATIVE
PCP UR QL SCN>25 NG/ML: NEGATIVE
PLATELET # BLD AUTO: 295 K/UL (ref 150–450)
PMV BLD AUTO: 8.9 FL (ref 9.2–12.9)
RBC # BLD AUTO: 3.99 M/UL (ref 4–5.4)
RPR SER QL: NORMAL
RUBV IGG SER-ACNC: 25.2 IU/ML
RUBV IGG SER-IMP: REACTIVE
TOXICOLOGY INFORMATION: ABNORMAL
TSH SERPL DL<=0.005 MIU/L-ACNC: 0.83 UIU/ML (ref 0.4–4)
WBC # BLD AUTO: 5.65 K/UL (ref 3.9–12.7)

## 2021-11-05 RX ORDER — RIBOFLAVIN (VITAMIN B2) 400 MG
400 TABLET ORAL DAILY
Qty: 30 TABLET | Refills: 5 | Status: SHIPPED | OUTPATIENT
Start: 2021-11-05 | End: 2021-11-08 | Stop reason: SDUPTHER

## 2021-11-08 LAB
# FETUSES US: NORMAL
2ND TRIMESTER 4 SCREEN PNL SERPL: NEGATIVE
2ND TRIMESTER 4 SCREEN SERPL-IMP: NORMAL
AFP MOM SERPL: 0.89
AFP SERPL-MCNC: 34.4 NG/ML
AGE AT DELIVERY: 32
B-HCG MOM SERPL: 0.9
B-HCG SERPL-ACNC: 30.3 IU/ML
FET TS 21 RISK FROM MAT AGE: NORMAL
GA (DAYS): 1 D
GA (WEEKS): 15 WK
GA METHOD: NORMAL
IDDM PATIENT QL: NORMAL
INHIBIN A MOM SERPL: 0.66
INHIBIN A SERPL-MCNC: 163.5 PG/ML
SMOKING STATUS FTND: YES
TS 18 RISK FETUS: NORMAL
TS 21 RISK FETUS: NORMAL
U ESTRIOL MOM SERPL: 1.02
U ESTRIOL SERPL-MCNC: 0.73 NG/ML

## 2021-11-09 ENCOUNTER — PATIENT MESSAGE (OUTPATIENT)
Dept: OBSTETRICS AND GYNECOLOGY | Facility: CLINIC | Age: 31
End: 2021-11-09
Payer: MEDICAID

## 2021-11-11 LAB — CFTR MUT ANL BLD/T: ABNORMAL

## 2021-11-29 ENCOUNTER — PATIENT MESSAGE (OUTPATIENT)
Dept: GENETICS | Facility: CLINIC | Age: 31
End: 2021-11-29
Payer: MEDICAID

## 2021-11-30 ENCOUNTER — PATIENT MESSAGE (OUTPATIENT)
Dept: GENETICS | Facility: CLINIC | Age: 31
End: 2021-11-30
Payer: MEDICAID

## 2021-11-30 DIAGNOSIS — G89.29 CHRONIC NONINTRACTABLE HEADACHE, UNSPECIFIED HEADACHE TYPE: ICD-10-CM

## 2021-11-30 DIAGNOSIS — R51.9 CHRONIC NONINTRACTABLE HEADACHE, UNSPECIFIED HEADACHE TYPE: ICD-10-CM

## 2021-11-30 RX ORDER — PNV 112/IRON/FOLIC/OM3/DHA/EPA 3.33-.33MG
TABLET,CHEWABLE ORAL
Qty: 100 TABLET | Refills: 1 | OUTPATIENT
Start: 2021-11-30

## 2021-11-30 RX ORDER — PNV 112/IRON/FOLIC/OM3/DHA/EPA 3.33-.33MG
TABLET,CHEWABLE ORAL
Qty: 300 TABLET | Refills: 1 | Status: SHIPPED | OUTPATIENT
Start: 2021-11-30 | End: 2022-04-26

## 2021-11-30 RX ORDER — BUTALBITAL, ACETAMINOPHEN AND CAFFEINE 50; 325; 40 MG/1; MG/1; MG/1
1 TABLET ORAL EVERY 6 HOURS PRN
Qty: 10 TABLET | Refills: 0 | OUTPATIENT
Start: 2021-11-30

## 2021-12-03 ENCOUNTER — PATIENT MESSAGE (OUTPATIENT)
Dept: OBSTETRICS AND GYNECOLOGY | Facility: CLINIC | Age: 31
End: 2021-12-03
Payer: MEDICAID

## 2021-12-06 ENCOUNTER — TELEPHONE (OUTPATIENT)
Dept: GENETICS | Facility: CLINIC | Age: 31
End: 2021-12-06
Payer: MEDICAID

## 2021-12-14 ENCOUNTER — TELEPHONE (OUTPATIENT)
Dept: OBSTETRICS AND GYNECOLOGY | Facility: CLINIC | Age: 31
End: 2021-12-14
Payer: MEDICAID

## 2021-12-16 ENCOUNTER — PATIENT MESSAGE (OUTPATIENT)
Dept: FAMILY MEDICINE | Facility: CLINIC | Age: 31
End: 2021-12-16
Payer: MEDICAID

## 2021-12-16 RX ORDER — LEVETIRACETAM 750 MG/1
1500 TABLET ORAL 2 TIMES DAILY
Qty: 120 TABLET | Refills: 1 | Status: SHIPPED | OUTPATIENT
Start: 2021-12-16 | End: 2022-04-28

## 2021-12-16 RX ORDER — PANTOPRAZOLE SODIUM 40 MG/1
40 TABLET, DELAYED RELEASE ORAL DAILY
Qty: 90 TABLET | Refills: 1 | Status: ON HOLD | OUTPATIENT
Start: 2021-12-16 | End: 2022-04-10 | Stop reason: HOSPADM

## 2021-12-17 ENCOUNTER — PATIENT MESSAGE (OUTPATIENT)
Dept: FAMILY MEDICINE | Facility: CLINIC | Age: 31
End: 2021-12-17
Payer: MEDICAID

## 2021-12-17 ENCOUNTER — PATIENT MESSAGE (OUTPATIENT)
Dept: OBSTETRICS AND GYNECOLOGY | Facility: CLINIC | Age: 31
End: 2021-12-17
Payer: MEDICAID

## 2022-01-05 ENCOUNTER — PATIENT MESSAGE (OUTPATIENT)
Dept: FAMILY MEDICINE | Facility: CLINIC | Age: 32
End: 2022-01-05
Payer: MEDICAID

## 2022-01-19 ENCOUNTER — PATIENT MESSAGE (OUTPATIENT)
Dept: ADMINISTRATIVE | Facility: OTHER | Age: 32
End: 2022-01-19
Payer: MEDICAID

## 2022-01-25 ENCOUNTER — HOSPITAL ENCOUNTER (OUTPATIENT)
Dept: RADIOLOGY | Facility: HOSPITAL | Age: 32
Discharge: HOME | End: 2022-01-25
Attending: SPECIALIST
Payer: MEDICAID

## 2022-01-25 DIAGNOSIS — Z3A.15 15 WEEKS GESTATION OF PREGNANCY: ICD-10-CM

## 2022-01-25 PROCEDURE — 76805 US OB 14+ WEEKS, TRANSABDOM, SINGLE GESTATION: ICD-10-PCS | Mod: 26,,, | Performed by: RADIOLOGY

## 2022-01-25 PROCEDURE — 76805 OB US >/= 14 WKS SNGL FETUS: CPT | Mod: 26,,, | Performed by: RADIOLOGY

## 2022-01-25 PROCEDURE — 76805 OB US >/= 14 WKS SNGL FETUS: CPT | Mod: TC,PN

## 2022-02-12 ENCOUNTER — PATIENT MESSAGE (OUTPATIENT)
Dept: NEUROLOGY | Facility: CLINIC | Age: 32
End: 2022-02-12
Payer: MEDICAID

## 2022-02-21 ENCOUNTER — ROUTINE PRENATAL (OUTPATIENT)
Dept: OBSTETRICS AND GYNECOLOGY | Facility: CLINIC | Age: 32
End: 2022-02-21
Payer: MEDICAID

## 2022-02-21 VITALS
WEIGHT: 140.44 LBS | SYSTOLIC BLOOD PRESSURE: 110 MMHG | DIASTOLIC BLOOD PRESSURE: 68 MMHG | BODY MASS INDEX: 24.11 KG/M2

## 2022-02-21 DIAGNOSIS — F15.20 METHAMPHETAMINE ADDICTION: ICD-10-CM

## 2022-02-21 DIAGNOSIS — F19.10 SUBSTANCE ABUSE: ICD-10-CM

## 2022-02-21 DIAGNOSIS — Z3A.31 31 WEEKS GESTATION OF PREGNANCY: Primary | ICD-10-CM

## 2022-02-21 LAB
BILIRUB SERPL-MCNC: NORMAL MG/DL
BLOOD URINE, POC: NORMAL
CLARITY, POC UA: CLEAR
COLOR, POC UA: NORMAL
GLUCOSE UR QL STRIP: NORMAL
KETONES UR QL STRIP: NORMAL
LEUKOCYTE ESTERASE URINE, POC: NORMAL
NITRITE, POC UA: NORMAL
PH, POC UA: 7
PROTEIN, POC: NORMAL
SPECIFIC GRAVITY, POC UA: NORMAL
UROBILINOGEN, POC UA: NORMAL

## 2022-02-21 PROCEDURE — 99999 PR PBB SHADOW E&M-EST. PATIENT-LVL III: CPT | Mod: PBBFAC,,, | Performed by: SPECIALIST

## 2022-02-21 PROCEDURE — 99999 PR PBB SHADOW E&M-EST. PATIENT-LVL III: ICD-10-PCS | Mod: PBBFAC,,, | Performed by: SPECIALIST

## 2022-02-21 PROCEDURE — 99214 PR OFFICE/OUTPT VISIT, EST, LEVL IV, 30-39 MIN: ICD-10-PCS | Mod: TH,S$PBB,, | Performed by: SPECIALIST

## 2022-02-21 PROCEDURE — 80307 DRUG TEST PRSMV CHEM ANLYZR: CPT | Performed by: SPECIALIST

## 2022-02-21 PROCEDURE — 99214 OFFICE O/P EST MOD 30 MIN: CPT | Mod: TH,S$PBB,, | Performed by: SPECIALIST

## 2022-02-21 PROCEDURE — 99213 OFFICE O/P EST LOW 20 MIN: CPT | Mod: PBBFAC,PN | Performed by: SPECIALIST

## 2022-02-21 PROCEDURE — 81002 URINALYSIS NONAUTO W/O SCOPE: CPT | Mod: 59,PBBFAC,PN | Performed by: SPECIALIST

## 2022-02-22 ENCOUNTER — PATIENT MESSAGE (OUTPATIENT)
Dept: OBSTETRICS AND GYNECOLOGY | Facility: CLINIC | Age: 32
End: 2022-02-22
Payer: MEDICAID

## 2022-02-22 LAB
AMPHET+METHAMPHET UR QL: NEGATIVE
BARBITURATES UR QL SCN>200 NG/ML: NEGATIVE
BENZODIAZ UR QL SCN>200 NG/ML: NEGATIVE
BZE UR QL SCN: NEGATIVE
CANNABINOIDS UR QL SCN: NEGATIVE
CREAT UR-MCNC: 20 MG/DL (ref 15–325)
ETHANOL UR-MCNC: <10 MG/DL
METHADONE UR QL SCN>300 NG/ML: NEGATIVE
OPIATES UR QL SCN: NEGATIVE
PCP UR QL SCN>25 NG/ML: NEGATIVE
TOXICOLOGY INFORMATION: NORMAL

## 2022-02-23 ENCOUNTER — PATIENT MESSAGE (OUTPATIENT)
Dept: ADMINISTRATIVE | Facility: OTHER | Age: 32
End: 2022-02-23
Payer: MEDICAID

## 2022-02-23 NOTE — PROGRESS NOTES
32 yo noncompliant female has not returned for care since 11/2  Pt continues to use Heroin, Meth and I discussed AGAIN the risks to mother as well as fetus  I have encourgaed and counseled pt to enter rehab multiple occasions  Today, pt states she is experiencing good fetal movement  Discussed need for repeat u/s to assess fetal growth and MIGUE    VSS  Abdomen grvid NT     Plan Will obtain UDS  Repeat u/s for growth and MIGUE  Begin weekly testing  Answered all questions

## 2022-02-24 ENCOUNTER — HOSPITAL ENCOUNTER (OUTPATIENT)
Dept: RADIOLOGY | Facility: HOSPITAL | Age: 32
Discharge: HOME | End: 2022-02-24
Attending: SPECIALIST
Payer: MEDICAID

## 2022-02-24 DIAGNOSIS — Z3A.31 31 WEEKS GESTATION OF PREGNANCY: ICD-10-CM

## 2022-02-24 PROCEDURE — 76815 OB US LIMITED FETUS(S): CPT | Mod: TC,PN

## 2022-02-24 PROCEDURE — 76815 OB US LIMITED FETUS(S): CPT | Mod: 26,,, | Performed by: RADIOLOGY

## 2022-02-24 PROCEDURE — 76815 US OB LIMITED 1 OR MORE GESTATIONS: ICD-10-PCS | Mod: 26,,, | Performed by: RADIOLOGY

## 2022-02-28 ENCOUNTER — ROUTINE PRENATAL (OUTPATIENT)
Dept: OBSTETRICS AND GYNECOLOGY | Facility: CLINIC | Age: 32
End: 2022-02-28
Payer: MEDICAID

## 2022-02-28 VITALS
BODY MASS INDEX: 25.24 KG/M2 | WEIGHT: 147.06 LBS | SYSTOLIC BLOOD PRESSURE: 122 MMHG | DIASTOLIC BLOOD PRESSURE: 82 MMHG

## 2022-02-28 DIAGNOSIS — R51.9 CHRONIC NONINTRACTABLE HEADACHE, UNSPECIFIED HEADACHE TYPE: ICD-10-CM

## 2022-02-28 DIAGNOSIS — G89.29 CHRONIC NONINTRACTABLE HEADACHE, UNSPECIFIED HEADACHE TYPE: ICD-10-CM

## 2022-02-28 DIAGNOSIS — Z3A.33 33 WEEKS GESTATION OF PREGNANCY: Primary | ICD-10-CM

## 2022-02-28 LAB
AMPHET+METHAMPHET UR QL: NEGATIVE
BARBITURATES UR QL SCN>200 NG/ML: NEGATIVE
BENZODIAZ UR QL SCN>200 NG/ML: NEGATIVE
BZE UR QL SCN: NEGATIVE
CANNABINOIDS UR QL SCN: NEGATIVE
CREAT UR-MCNC: 13 MG/DL (ref 15–325)
ETHANOL UR-MCNC: <10 MG/DL
METHADONE UR QL SCN>300 NG/ML: NEGATIVE
OPIATES UR QL SCN: NEGATIVE
PCP UR QL SCN>25 NG/ML: NEGATIVE
TOXICOLOGY INFORMATION: ABNORMAL

## 2022-02-28 PROCEDURE — 99213 OFFICE O/P EST LOW 20 MIN: CPT | Mod: PBBFAC,PN | Performed by: SPECIALIST

## 2022-02-28 PROCEDURE — 99999 PR PBB SHADOW E&M-EST. PATIENT-LVL III: CPT | Mod: PBBFAC,,, | Performed by: SPECIALIST

## 2022-02-28 PROCEDURE — 99214 OFFICE O/P EST MOD 30 MIN: CPT | Mod: TH,S$PBB,, | Performed by: SPECIALIST

## 2022-02-28 PROCEDURE — 99999 PR PBB SHADOW E&M-EST. PATIENT-LVL III: ICD-10-PCS | Mod: PBBFAC,,, | Performed by: SPECIALIST

## 2022-02-28 PROCEDURE — 99214 PR OFFICE/OUTPT VISIT, EST, LEVL IV, 30-39 MIN: ICD-10-PCS | Mod: TH,S$PBB,, | Performed by: SPECIALIST

## 2022-02-28 PROCEDURE — 80307 DRUG TEST PRSMV CHEM ANLYZR: CPT | Performed by: SPECIALIST

## 2022-02-28 RX ORDER — ASCORBIC ACID, CHOLECALCIFEROL, .ALPHA.-TOCOPHEROL ACETATE, DL-, PYRIDOXINE HYDROCHLORIDE, FOLIC ACID, CYANOCOBALAMIN, BIOTIN, CALCIUM CARBONATE, FERROUS ASPARTO GLYCINATE, IRON, POTASSIUM IODIDE, MAGNESIUM OXIDE, DOCONEXENT AND LOWBUSH BLUEBERRY 60; 1000; 10; 26; 400; 13; 280; 80; 9; 9; 150; 25; 350; 25; 600 MG/1; [IU]/1; [IU]/1; MG/1; UG/1; UG/1; UG/1; MG/1; MG/1; MG/1; UG/1; MG/1; MG/1; MG/1; UG/1
1 CAPSULE, GELATIN COATED ORAL DAILY
Qty: 100 CAPSULE | Refills: 3 | Status: SHIPPED | OUTPATIENT
Start: 2022-02-28 | End: 2022-04-26

## 2022-02-28 RX ORDER — BUTALBITAL, ACETAMINOPHEN AND CAFFEINE 50; 325; 40 MG/1; MG/1; MG/1
1 TABLET ORAL EVERY 6 HOURS PRN
Qty: 10 TABLET | Refills: 0 | OUTPATIENT
Start: 2022-02-28

## 2022-02-28 RX ORDER — PNV 112/IRON/FOLIC/OM3/DHA/EPA 3.33-.33MG
TABLET,CHEWABLE ORAL
Qty: 300 TABLET | Refills: 1 | OUTPATIENT
Start: 2022-02-28

## 2022-02-28 NOTE — PROGRESS NOTES
I DISCUSSED POSITIVE UDE  AND PT STATES CESSATION  AGAIN DISCUSSED RISKS TO MOTHER AND FETUS  DISCUSSED U/S REVEALING APPROPRIATE GROWTH AND MIGUE  RANDOM  AND WILL FOLLOW  I DISCUSSED AND REC WEEKLY TESTING THROUGH REMAINDER OF PREGNANCY  DAILY KICK COUNTS  AGAIN INFORMED PT OF WEEKLY UDS AND WILL OBTAIN TODAY  DISCUSSED STERILIZATION AND PT DESIRES SAME. WILL SIGN CONSENTS TODAY    Complaints today:none , Good fetal movements reported No Bleeding or pains    /82   Wt 66.7 kg (147 lb 0.8 oz)   LMP 2021   BMI 25.24 kg/m²     31 y.o., at 33w5d by Estimated Date of Delivery: 22  Patient Active Problem List   Diagnosis    Cervical high risk human papillomavirus (HPV) DNA test positive    Epilepsy    Papanicolaou smear of cervix with low grade squamous intraepithelial lesion (LGSIL)    Anxiety    Opioid dependence with opioid-induced disorder    Vestibulitis, vulvar    Pelvic pain in female    Migraine without status migrainosus, not intractable    Seizures    Chronic bilateral low back pain without sciatica    IUD complication    Encounter for long-term (current) use of medications    Chronic hepatitis C without hepatic coma    Chronic back pain    Gabapentin overdose    Personal history of high risk medication treatment    Methamphetamine abuse    Overdose    Substance abuse    Tobacco dependence syndrome    Cough    Chronic migraine    Methamphetamine addiction     OB History    Para Term  AB Living   6 4 4   1 4   SAB IAB Ectopic Multiple Live Births   1       4      # Outcome Date GA Lbr Jey/2nd Weight Sex Delivery Anes PTL Lv   6 Current            5 Term    3.232 kg (7 lb 2 oz) M Vag-Spont EPI N GRACIELA   4 Term    3.345 kg (7 lb 6 oz) F Vag-Spont EPI N GRACIELA   3 Term    2.863 kg (6 lb 5 oz) M Vag-Spont EPI N GRACIELA   2 SAB 2006              Birth Comments: 6 weeks/   1 Term    3.204 kg (7 lb 1 oz) F Vag-Spont EPI N GRACIELA      Birth Comments:  PIH       Dating reviewed    Allergies and problem list reviewed and updated    Medical and surgical history reviewed    Prenatal labs reviewed and updated    Physical Exam:  ABD: soft, gravid, nontender,     Assessment:  IUP 33 w 5 d  Substance abuse    Plan:   Tubal consents  UDS  NST q week  follow up 1Week , bleeding/pain precautions  kick counts, labor precautions

## 2022-03-03 ENCOUNTER — PATIENT MESSAGE (OUTPATIENT)
Dept: OBSTETRICS AND GYNECOLOGY | Facility: CLINIC | Age: 32
End: 2022-03-03
Payer: MEDICAID

## 2022-03-03 DIAGNOSIS — Z87.898 HISTORY OF SEIZURE: ICD-10-CM

## 2022-03-03 NOTE — TELEPHONE ENCOUNTER
Refills have been requested for the for the following medication:  Riboflavin, vitamin B2, 400 mg Tab   Take 400 mg by mouth once daily.   Dispense: 30 tablet     Refills: 5        Request forwarded to Dr. Lazcano for review.

## 2022-03-04 RX ORDER — RIBOFLAVIN (VITAMIN B2) 400 MG
400 TABLET ORAL DAILY
Qty: 30 TABLET | Refills: 5 | Status: ON HOLD | OUTPATIENT
Start: 2022-03-04 | End: 2022-04-10 | Stop reason: HOSPADM

## 2022-03-04 NOTE — TELEPHONE ENCOUNTER
Refill request for:   riboflavin, vitamin B2, 400 mg Tab   Take 400 mg by mouth once daily.   Dispense: 30 tablet     Refills: 5       Has been approved per TONIE Méndez and sent to pharm to dispense. Pt notified.

## 2022-03-05 ENCOUNTER — PATIENT MESSAGE (OUTPATIENT)
Dept: OBSTETRICS AND GYNECOLOGY | Facility: CLINIC | Age: 32
End: 2022-03-05
Payer: MEDICAID

## 2022-03-06 ENCOUNTER — PATIENT MESSAGE (OUTPATIENT)
Dept: OBSTETRICS AND GYNECOLOGY | Facility: CLINIC | Age: 32
End: 2022-03-06
Payer: MEDICAID

## 2022-03-15 ENCOUNTER — PATIENT MESSAGE (OUTPATIENT)
Dept: OBSTETRICS AND GYNECOLOGY | Facility: CLINIC | Age: 32
End: 2022-03-15
Payer: MEDICAID

## 2022-03-17 ENCOUNTER — ROUTINE PRENATAL (OUTPATIENT)
Dept: OBSTETRICS AND GYNECOLOGY | Facility: CLINIC | Age: 32
End: 2022-03-17
Payer: MEDICAID

## 2022-03-17 ENCOUNTER — PATIENT MESSAGE (OUTPATIENT)
Dept: OBSTETRICS AND GYNECOLOGY | Facility: CLINIC | Age: 32
End: 2022-03-17

## 2022-03-17 VITALS
WEIGHT: 146.63 LBS | DIASTOLIC BLOOD PRESSURE: 82 MMHG | BODY MASS INDEX: 25.16 KG/M2 | SYSTOLIC BLOOD PRESSURE: 132 MMHG

## 2022-03-17 DIAGNOSIS — Z3A.36 36 WEEKS GESTATION OF PREGNANCY: Primary | ICD-10-CM

## 2022-03-17 PROCEDURE — 99212 OFFICE O/P EST SF 10 MIN: CPT | Mod: PBBFAC,PN | Performed by: SPECIALIST

## 2022-03-17 PROCEDURE — 99214 PR OFFICE/OUTPT VISIT, EST, LEVL IV, 30-39 MIN: ICD-10-PCS | Mod: TH,25,S$PBB, | Performed by: SPECIALIST

## 2022-03-17 PROCEDURE — 99999 PR PBB SHADOW E&M-EST. PATIENT-LVL II: CPT | Mod: PBBFAC,,, | Performed by: SPECIALIST

## 2022-03-17 PROCEDURE — 87081 CULTURE SCREEN ONLY: CPT | Performed by: SPECIALIST

## 2022-03-17 PROCEDURE — 99999 PR PBB SHADOW E&M-EST. PATIENT-LVL II: ICD-10-PCS | Mod: PBBFAC,,, | Performed by: SPECIALIST

## 2022-03-17 PROCEDURE — 59025 PR FETAL 2N-STRESS TEST: ICD-10-PCS | Mod: 26,S$PBB,, | Performed by: SPECIALIST

## 2022-03-17 PROCEDURE — 59025 FETAL NON-STRESS TEST: CPT | Mod: PBBFAC,PN | Performed by: SPECIALIST

## 2022-03-17 PROCEDURE — 59025 FETAL NON-STRESS TEST: CPT | Mod: 26,S$PBB,, | Performed by: SPECIALIST

## 2022-03-17 PROCEDURE — 99214 OFFICE O/P EST MOD 30 MIN: CPT | Mod: TH,25,S$PBB, | Performed by: SPECIALIST

## 2022-03-17 NOTE — PROGRESS NOTES
FETAL SURVEILLANCE TESTING SUMMARY  INDICATIONS: SA     Fetal doppler heart rate tracing obtained in the usual fashion with the patient in the left supine position.     OBJECTIVE RESULTS:  Baseline fetal heart rate: 150     Fetal heart variability: present  Fetal Heart Rate decelerations:no  Fetal acoustic stimulator: no  Fetal Heart Rate accelerations: present  Fetal Non-stress Test: Reactive  Uterine irritability:no     Fetal surveillance: Reasurring    Complaints today:pressure  Good fetal movements reported No Bleeding or pains    States she has not used in 1 week  Again counseled on risks to mother and fetus    /82   Wt 66.5 kg (146 lb 9.7 oz)   LMP 2021   BMI 25.16 kg/m²     31 y.o., at 36w1d by Estimated Date of Delivery: 22  Patient Active Problem List   Diagnosis    Cervical high risk human papillomavirus (HPV) DNA test positive    Epilepsy    Papanicolaou smear of cervix with low grade squamous intraepithelial lesion (LGSIL)    Anxiety    Opioid dependence with opioid-induced disorder    Vestibulitis, vulvar    Pelvic pain in female    Migraine without status migrainosus, not intractable    Seizures    Chronic bilateral low back pain without sciatica    IUD complication    Encounter for long-term (current) use of medications    Chronic hepatitis C without hepatic coma    Chronic back pain    Gabapentin overdose    Personal history of high risk medication treatment    Methamphetamine abuse    Overdose    Substance abuse    Tobacco dependence syndrome    Cough    Chronic migraine    Methamphetamine addiction     OB History    Para Term  AB Living   6 4 4   1 4   SAB IAB Ectopic Multiple Live Births   1       4      # Outcome Date GA Lbr Jey/2nd Weight Sex Delivery Anes PTL Lv   6 Current            5 Term    3.232 kg (7 lb 2 oz) M Vag-Spont EPI N GRACIELA   4 Term    3.345 kg (7 lb 6 oz) F Vag-Spont EPI N GRACIELA   3 Term    2.863 kg (6 lb 5 oz) M  Vag-Spont EPI N GRACIELA   2 SAB 2006              Birth Comments: 6 weeks/   1 Term 2006   3.204 kg (7 lb 1 oz) F Vag-Spont EPI N GRACIELA      Birth Comments: PIH       Dating reviewed    Allergies and problem list reviewed and updated    Medical and surgical history reviewed    Prenatal labs reviewed and updated    Physical Exam:  ABD: soft, gravid, nontender,   Cervix 1/30 -2    Assessment:  IUP 36 weeks  Reasurring testing  SA    Plan:   GBS  Continue weekly testing  follow up 1Weeks, bleeding/pain precautions   kick counts, labor precautions

## 2022-03-20 ENCOUNTER — PATIENT MESSAGE (OUTPATIENT)
Dept: OBSTETRICS AND GYNECOLOGY | Facility: CLINIC | Age: 32
End: 2022-03-20
Payer: MEDICAID

## 2022-03-21 LAB — BACTERIA SPEC AEROBE CULT: NORMAL

## 2022-03-22 ENCOUNTER — PATIENT OUTREACH (OUTPATIENT)
Dept: ADMINISTRATIVE | Facility: OTHER | Age: 32
End: 2022-03-22
Payer: MEDICAID

## 2022-03-22 NOTE — PROGRESS NOTES
Health Maintenance Due   Topic Date Due    Pneumococcal Vaccines (Age 0-64) (1 of 2 - PPSV23) Never done    TETANUS VACCINE  08/01/2021    Influenza Vaccine (1) 09/01/2021    COVID-19 Vaccine (2 - Pfizer 3-dose series) 01/03/2022     Updates were requested from care everywhere.  Chart was reviewed for overdue Proactive [facility] Encounters (GLADYS) topics (CRS, Breast Cancer Screening, Eye exam)  Health Maintenance has been updated.  LINKS immunization registry triggered.  Immunizations were reconciled.

## 2022-03-28 ENCOUNTER — ROUTINE PRENATAL (OUTPATIENT)
Dept: OBSTETRICS AND GYNECOLOGY | Facility: CLINIC | Age: 32
End: 2022-03-28
Payer: MEDICAID

## 2022-03-28 VITALS
SYSTOLIC BLOOD PRESSURE: 106 MMHG | BODY MASS INDEX: 23.92 KG/M2 | WEIGHT: 139.31 LBS | DIASTOLIC BLOOD PRESSURE: 64 MMHG

## 2022-03-28 DIAGNOSIS — F15.20 METHAMPHETAMINE ADDICTION: Primary | ICD-10-CM

## 2022-03-28 DIAGNOSIS — Z3A.37 37 WEEKS GESTATION OF PREGNANCY: ICD-10-CM

## 2022-03-28 LAB
AMPHET+METHAMPHET UR QL: ABNORMAL
BARBITURATES UR QL SCN>200 NG/ML: NEGATIVE
BENZODIAZ UR QL SCN>200 NG/ML: NEGATIVE
BILIRUB SERPL-MCNC: NORMAL MG/DL
BLOOD URINE, POC: NORMAL
BZE UR QL SCN: NEGATIVE
CANNABINOIDS UR QL SCN: NEGATIVE
CLARITY, POC UA: NORMAL
COLOR, POC UA: YELLOW
CREAT UR-MCNC: 138 MG/DL (ref 15–325)
ETHANOL UR-MCNC: <10 MG/DL
GLUCOSE UR QL STRIP: NORMAL
KETONES UR QL STRIP: NORMAL
LEUKOCYTE ESTERASE URINE, POC: POSITIVE
METHADONE UR QL SCN>300 NG/ML: NEGATIVE
NITRITE, POC UA: NORMAL
OPIATES UR QL SCN: NEGATIVE
PCP UR QL SCN>25 NG/ML: NEGATIVE
PH, POC UA: 7
PROTEIN, POC: NORMAL
SPECIFIC GRAVITY, POC UA: NORMAL
TOXICOLOGY INFORMATION: ABNORMAL
UROBILINOGEN, POC UA: NORMAL

## 2022-03-28 PROCEDURE — 80307 DRUG TEST PRSMV CHEM ANLYZR: CPT | Performed by: SPECIALIST

## 2022-03-28 PROCEDURE — 59025 FETAL NON-STRESS TEST: CPT | Mod: PBBFAC,PN | Performed by: SPECIALIST

## 2022-03-28 PROCEDURE — 59025 FETAL NON-STRESS TEST: CPT | Mod: 26,S$PBB,, | Performed by: SPECIALIST

## 2022-03-28 PROCEDURE — 99999 PR PBB SHADOW E&M-EST. PATIENT-LVL I: ICD-10-PCS | Mod: PBBFAC,,, | Performed by: SPECIALIST

## 2022-03-28 PROCEDURE — 99211 OFF/OP EST MAY X REQ PHY/QHP: CPT | Mod: PBBFAC,PN | Performed by: SPECIALIST

## 2022-03-28 PROCEDURE — 59025 PR FETAL 2N-STRESS TEST: ICD-10-PCS | Mod: 26,S$PBB,, | Performed by: SPECIALIST

## 2022-03-28 PROCEDURE — 99999 PR PBB SHADOW E&M-EST. PATIENT-LVL I: CPT | Mod: PBBFAC,,, | Performed by: SPECIALIST

## 2022-03-28 PROCEDURE — 99214 OFFICE O/P EST MOD 30 MIN: CPT | Mod: TH,25,S$PBB, | Performed by: SPECIALIST

## 2022-03-28 PROCEDURE — 99214 PR OFFICE/OUTPT VISIT, EST, LEVL IV, 30-39 MIN: ICD-10-PCS | Mod: TH,25,S$PBB, | Performed by: SPECIALIST

## 2022-03-28 PROCEDURE — 81002 URINALYSIS NONAUTO W/O SCOPE: CPT | Mod: PBBFAC,PN | Performed by: SPECIALIST

## 2022-03-28 NOTE — PROGRESS NOTES
FETAL SURVEILLANCE TESTING SUMMARY  INDICATIONS:  SA     Fetal doppler heart rate tracing obtained in the usual fashion with the patient in the left supine position.     OBJECTIVE RESULTS:  Baseline fetal heart rate: 144     Fetal heart variability: present  Fetal Heart Rate decelerations:no  Fetal acoustic stimulator: no  Fetal Heart Rate accelerations: present  Fetal Non-stress Test: Reactive  Uterine irritability:no     Fetal surveillance: Reasurring      Complaints today:none , Good fetal movements reported No Bleeding or pains  Denies illict drug use recently    /64   Wt 63.2 kg (139 lb 5.3 oz)   LMP 2021   BMI 23.92 kg/m²     31 y.o., at 37w5d by Estimated Date of Delivery: 22  Patient Active Problem List   Diagnosis    Cervical high risk human papillomavirus (HPV) DNA test positive    Epilepsy    Papanicolaou smear of cervix with low grade squamous intraepithelial lesion (LGSIL)    Anxiety    Opioid dependence with opioid-induced disorder    Vestibulitis, vulvar    Pelvic pain in female    Migraine without status migrainosus, not intractable    Seizures    Chronic bilateral low back pain without sciatica    IUD complication    Encounter for long-term (current) use of medications    Chronic hepatitis C without hepatic coma    Chronic back pain    Gabapentin overdose    Personal history of high risk medication treatment    Methamphetamine abuse    Overdose    Substance abuse    Tobacco dependence syndrome    Cough    Chronic migraine    Methamphetamine addiction     OB History    Para Term  AB Living   6 4 4   1 4   SAB IAB Ectopic Multiple Live Births   1       4      # Outcome Date GA Lbr Jey/2nd Weight Sex Delivery Anes PTL Lv   6 Current            5 Term    3.232 kg (7 lb 2 oz) M Vag-Spont EPI N GRACIELA   4 Term    3.345 kg (7 lb 6 oz) F Vag-Spont EPI N GRACIELA   3 Term    2.863 kg (6 lb 5 oz) M Vag-Spont EPI N GRACIELA   2 SAB                Birth Comments: 6 weeks/   1 Term 2006   3.204 kg (7 lb 1 oz) F Vag-Spont EPI N GRACIELA      Birth Comments: PIH       Dating reviewed    Allergies and problem list reviewed and updated    Medical and surgical history reviewed    Prenatal labs reviewed and updated    Physical Exam:  ABD: soft, gravid, nontender,     Assessment:  IUP 37 weeks    Plan:   Daily kick counts  Review UDS  follow up 1Weeks, bleeding/pain precautions  kick counts, labor precautions

## 2022-04-04 ENCOUNTER — ROUTINE PRENATAL (OUTPATIENT)
Dept: OBSTETRICS AND GYNECOLOGY | Facility: CLINIC | Age: 32
End: 2022-04-04
Payer: MEDICAID

## 2022-04-04 VITALS — BODY MASS INDEX: 24.9 KG/M2 | DIASTOLIC BLOOD PRESSURE: 68 MMHG | WEIGHT: 145.06 LBS | SYSTOLIC BLOOD PRESSURE: 116 MMHG

## 2022-04-04 DIAGNOSIS — Z3A.38 38 WEEKS GESTATION OF PREGNANCY: Primary | ICD-10-CM

## 2022-04-04 LAB
BILIRUB SERPL-MCNC: NORMAL MG/DL
BLOOD URINE, POC: NORMAL
CLARITY, POC UA: NORMAL
COLOR, POC UA: YELLOW
GLUCOSE UR QL STRIP: NORMAL
KETONES UR QL STRIP: NORMAL
LEUKOCYTE ESTERASE URINE, POC: NORMAL
NITRITE, POC UA: NORMAL
PH, POC UA: 7
PROTEIN, POC: NORMAL
SPECIFIC GRAVITY, POC UA: NORMAL
UROBILINOGEN, POC UA: NORMAL

## 2022-04-04 PROCEDURE — 81002 URINALYSIS NONAUTO W/O SCOPE: CPT | Mod: PBBFAC,PN | Performed by: SPECIALIST

## 2022-04-04 PROCEDURE — 59025 FETAL NON-STRESS TEST: CPT | Mod: 26,S$PBB,, | Performed by: SPECIALIST

## 2022-04-04 PROCEDURE — 99214 OFFICE O/P EST MOD 30 MIN: CPT | Mod: TH,25,S$PBB, | Performed by: SPECIALIST

## 2022-04-04 PROCEDURE — 99999 PR PBB SHADOW E&M-EST. PATIENT-LVL III: ICD-10-PCS | Mod: PBBFAC,,, | Performed by: SPECIALIST

## 2022-04-04 PROCEDURE — 99999 PR PBB SHADOW E&M-EST. PATIENT-LVL III: CPT | Mod: PBBFAC,,, | Performed by: SPECIALIST

## 2022-04-04 PROCEDURE — 59025 FETAL NON-STRESS TEST: CPT | Mod: PBBFAC,PN | Performed by: SPECIALIST

## 2022-04-04 PROCEDURE — 99214 PR OFFICE/OUTPT VISIT, EST, LEVL IV, 30-39 MIN: ICD-10-PCS | Mod: TH,25,S$PBB, | Performed by: SPECIALIST

## 2022-04-04 PROCEDURE — 59025 PR FETAL 2N-STRESS TEST: ICD-10-PCS | Mod: 26,S$PBB,, | Performed by: SPECIALIST

## 2022-04-04 PROCEDURE — 80307 DRUG TEST PRSMV CHEM ANLYZR: CPT | Performed by: SPECIALIST

## 2022-04-04 PROCEDURE — 99213 OFFICE O/P EST LOW 20 MIN: CPT | Mod: PBBFAC,PN,25 | Performed by: SPECIALIST

## 2022-04-05 ENCOUNTER — PATIENT MESSAGE (OUTPATIENT)
Dept: OBSTETRICS AND GYNECOLOGY | Facility: CLINIC | Age: 32
End: 2022-04-05
Payer: MEDICAID

## 2022-04-05 LAB
AMPHET+METHAMPHET UR QL: NEGATIVE
BARBITURATES UR QL SCN>200 NG/ML: NEGATIVE
BENZODIAZ UR QL SCN>200 NG/ML: NEGATIVE
BZE UR QL SCN: NEGATIVE
CANNABINOIDS UR QL SCN: NEGATIVE
CREAT UR-MCNC: 30 MG/DL (ref 15–325)
ETHANOL UR-MCNC: <10 MG/DL
METHADONE UR QL SCN>300 NG/ML: NEGATIVE
OPIATES UR QL SCN: NEGATIVE
PCP UR QL SCN>25 NG/ML: NEGATIVE
TOXICOLOGY INFORMATION: NORMAL

## 2022-04-07 NOTE — PROGRESS NOTES
Complaints today:none , Good fetal movements reported No Bleeding or pains  Denies recent illicit use  FETAL SURVEILLANCE TESTING SUMMARY  INDICATIONS:SAB     Fetal doppler heart rate tracing obtained in the usual fashion with the patient in the left supine position.     OBJECTIVE RESULTS:  Baseline fetal heart rate: 144     Fetal heart variability: present  Fetal Heart Rate decelerations:no  Fetal acoustic stimulator: no  Fetal Heart Rate accelerations: present  Fetal Non-stress Test: Reactive  Uterine irritability:no     Fetal surveillance: Reasurring       /68   Wt 65.8 kg (145 lb 1 oz)   LMP 2021   BMI 24.90 kg/m²     31 y.o., at 39w1d by Estimated Date of Delivery: 22  Patient Active Problem List   Diagnosis    Cervical high risk human papillomavirus (HPV) DNA test positive    Epilepsy    Papanicolaou smear of cervix with low grade squamous intraepithelial lesion (LGSIL)    Anxiety    Opioid dependence with opioid-induced disorder    Vestibulitis, vulvar    Pelvic pain in female    Migraine without status migrainosus, not intractable    Seizures    Chronic bilateral low back pain without sciatica    IUD complication    Encounter for long-term (current) use of medications    Chronic hepatitis C without hepatic coma    Chronic back pain    Gabapentin overdose    Personal history of high risk medication treatment    Methamphetamine abuse    Overdose    Substance abuse    Tobacco dependence syndrome    Cough    Chronic migraine    Methamphetamine addiction     OB History    Para Term  AB Living   6 4 4   1 4   SAB IAB Ectopic Multiple Live Births   1       4      # Outcome Date GA Lbr Jey/2nd Weight Sex Delivery Anes PTL Lv   6 Current            5 Term    3.232 kg (7 lb 2 oz) M Vag-Spont EPI N GRACIELA   4 Term    3.345 kg (7 lb 6 oz) F Vag-Spont EPI N GRACIELA   3 Term    2.863 kg (6 lb 5 oz) M Vag-Spont EPI N GRACIELA   2 SAB 2006              Birth Comments:  6 weeks/   1 Term 2006   3.204 kg (7 lb 1 oz) F Vag-Spont EPI N GRACIELA      Birth Comments: PIH       Dating reviewed    Allergies and problem list reviewed and updated    Medical and surgical history reviewed    Prenatal labs reviewed and updated    Physical Exam:  ABD: soft, gravid, nontender,     Assessment:  IUP 38 weeks  Substance abuse   Reasurring monitoring    Plan:   Discussed and will offer MIL   kick counts, labor precautions

## 2022-04-08 ENCOUNTER — PATIENT MESSAGE (OUTPATIENT)
Dept: OBSTETRICS AND GYNECOLOGY | Facility: CLINIC | Age: 32
End: 2022-04-08
Payer: MEDICAID

## 2022-04-10 PROBLEM — T83.9XXA IUD COMPLICATION: Status: RESOLVED | Noted: 2018-01-17 | Resolved: 2022-04-10

## 2022-04-13 ENCOUNTER — PATIENT MESSAGE (OUTPATIENT)
Dept: OBSTETRICS AND GYNECOLOGY | Facility: CLINIC | Age: 32
End: 2022-04-13
Payer: MEDICAID

## 2022-04-18 ENCOUNTER — POSTPARTUM VISIT (OUTPATIENT)
Dept: OBSTETRICS AND GYNECOLOGY | Facility: CLINIC | Age: 32
End: 2022-04-18
Payer: MEDICAID

## 2022-04-18 ENCOUNTER — PATIENT MESSAGE (OUTPATIENT)
Dept: OBSTETRICS AND GYNECOLOGY | Facility: CLINIC | Age: 32
End: 2022-04-18

## 2022-04-18 VITALS
WEIGHT: 132.69 LBS | SYSTOLIC BLOOD PRESSURE: 122 MMHG | HEIGHT: 64 IN | DIASTOLIC BLOOD PRESSURE: 66 MMHG | BODY MASS INDEX: 22.65 KG/M2

## 2022-04-18 PROCEDURE — 99213 OFFICE O/P EST LOW 20 MIN: CPT | Mod: PBBFAC,PN | Performed by: SPECIALIST

## 2022-04-18 PROCEDURE — 99999 PR PBB SHADOW E&M-EST. PATIENT-LVL III: ICD-10-PCS | Mod: PBBFAC,,, | Performed by: SPECIALIST

## 2022-04-18 PROCEDURE — 99999 PR PBB SHADOW E&M-EST. PATIENT-LVL III: CPT | Mod: PBBFAC,,, | Performed by: SPECIALIST

## 2022-04-18 PROCEDURE — 59430 PR CARE AFTER DELIVERY ONLY: ICD-10-PCS | Mod: ,,, | Performed by: SPECIALIST

## 2022-04-18 RX ORDER — GABAPENTIN 800 MG/1
800 TABLET ORAL 3 TIMES DAILY
Qty: 90 TABLET | Refills: 0 | Status: SHIPPED | OUTPATIENT
Start: 2022-04-18 | End: 2022-04-26 | Stop reason: SDUPTHER

## 2022-04-18 RX ORDER — BUTALBITAL, ACETAMINOPHEN AND CAFFEINE 50; 325; 40 MG/1; MG/1; MG/1
1 TABLET ORAL EVERY 6 HOURS PRN
Qty: 15 TABLET | Refills: 0 | Status: SHIPPED | OUTPATIENT
Start: 2022-04-18 | End: 2022-04-26 | Stop reason: SDUPTHER

## 2022-04-18 NOTE — PROGRESS NOTES
32 yo Wf  s/p  presents for PP eval. No complaints Pos Drug screen at [facility] and CPS notified with child custody with grandmother. Pt denies use PP  Denies PP depression s/s  I discussed PP care and pt desires sterilization Discussed risks and benefits lap BTL  Past Medical History:   Diagnosis Date    Abnormal Pap smear     ALLERGIC RHINITIS     Anemia     Chronic back pain     Drug-seeking behavior 2012    Epilepsy     GERD (gastroesophageal reflux disease)     Hepatitis C     Opioid dependence 2012    Pre-eclampsia     Seizures     last-10/2011, EEG-normal    Urinary tract infection        Past Surgical History:   Procedure Laterality Date    CHOLECYSTECTOMY      mirena removal  2018    PELVIC LAPAROSCOPY      iud retrieval     VAGINAL DELIVERY      times 4    vestibulectomy      WISDOM TOOTH EXTRACTION         Family History   Problem Relation Age of Onset    Stroke Mother     Depression Sister     Breast cancer Neg Hx     Ovarian cancer Neg Hx        Social History     Socioeconomic History    Marital status: Single   Tobacco Use    Smoking status: Current Every Day Smoker     Packs/day: 1.00     Years: 10.00     Pack years: 10.00     Types: Cigarettes    Smokeless tobacco: Never Used   Substance and Sexual Activity    Alcohol use: Not Currently     Comment: occasional    Drug use: Yes     Types: Hydrocodone, Methamphetamines    Sexual activity: Yes     Partners: Male     Birth control/protection: None       Current Outpatient Medications   Medication Sig Dispense Refill    busPIRone (BUSPAR) 5 MG Tab Take 1 tablet (5 mg total) by mouth 2 (two) times daily. 60 tablet 11    ibuprofen (ADVIL,MOTRIN) 600 MG tablet Take 1 tablet (600 mg total) by mouth every 6 (six) hours as needed for Pain. 30 tablet 0    nicotine (NICODERM CQ) 14 mg/24 hr Place 1 patch onto the skin once daily. 28 patch 0    levETIRAcetam (KEPPRA) 750 MG Tab Take 2 tablets (1,500 mg total) by  mouth 2 (two) times daily. (Patient not taking: Reported on 4/18/2022) 120 tablet 1    -iron-FA-om-3s-dha-epa (VITAFOL GUMMIES) 3.33 mg iron- 0.33 mg Chew I po bid (Patient not taking: Reported on 4/18/2022) 300 tablet 1    prenatal vit 87-iron-folic-dha (PRENATE MINI, FERR ASP GLYCIN,) 18-1-350 mg Cap Take 1 tablet by mouth once daily. 100 capsule 3     No current facility-administered medications for this visit.       Review of patient's allergies indicates:   Allergen Reactions    Darvocet a500 [propoxyphene n-acetaminophen]     Phenytoin sodium extended Other (See Comments)       Review of System:   General: no chills, fever, night sweats, weight gain or weight loss  Psychological: no depression or suicidal ideation  Breasts: no new or changing breast lumps, nipple discharge or masses.  Respiratory: no cough, shortness of breath, or wheezing  Cardiovascular: no chest pain or dyspnea on exertion  Gastrointestinal: no abdominal pain, change in bowel habits, or black or bloody stools  Genito-Urinary: no incontinence, urinary frequency/urgency or vulvar/vaginal symptoms, pelvic pain or abnormal vaginal bleeding.  Musculoskeletal: no gait disturbance or muscular weakness    RTO 4 weeks and will schedule outpt  Lap BTL

## 2022-04-21 DIAGNOSIS — Z01.818 PRE-OP TESTING: ICD-10-CM

## 2022-04-21 DIAGNOSIS — Z30.2 STERILIZATION: Primary | ICD-10-CM

## 2022-04-22 ENCOUNTER — PATIENT MESSAGE (OUTPATIENT)
Dept: OBSTETRICS AND GYNECOLOGY | Facility: CLINIC | Age: 32
End: 2022-04-22
Payer: MEDICAID

## 2022-04-25 ENCOUNTER — PATIENT MESSAGE (OUTPATIENT)
Dept: OBSTETRICS AND GYNECOLOGY | Facility: CLINIC | Age: 32
End: 2022-04-25
Payer: MEDICAID

## 2022-04-26 ENCOUNTER — OFFICE VISIT (OUTPATIENT)
Dept: FAMILY MEDICINE | Facility: CLINIC | Age: 32
End: 2022-04-26
Payer: MEDICAID

## 2022-04-26 DIAGNOSIS — M54.50 CHRONIC BILATERAL LOW BACK PAIN WITHOUT SCIATICA: ICD-10-CM

## 2022-04-26 DIAGNOSIS — F15.10 METHAMPHETAMINE ABUSE: ICD-10-CM

## 2022-04-26 DIAGNOSIS — G89.29 CHRONIC BILATERAL LOW BACK PAIN WITHOUT SCIATICA: ICD-10-CM

## 2022-04-26 DIAGNOSIS — G89.29 CHRONIC NONINTRACTABLE HEADACHE, UNSPECIFIED HEADACHE TYPE: ICD-10-CM

## 2022-04-26 DIAGNOSIS — R51.9 CHRONIC NONINTRACTABLE HEADACHE, UNSPECIFIED HEADACHE TYPE: ICD-10-CM

## 2022-04-26 DIAGNOSIS — Z79.899 ENCOUNTER FOR LONG-TERM (CURRENT) USE OF MEDICATIONS: ICD-10-CM

## 2022-04-26 DIAGNOSIS — F41.9 ANXIETY AND DEPRESSION: Primary | ICD-10-CM

## 2022-04-26 DIAGNOSIS — F32.A ANXIETY AND DEPRESSION: Primary | ICD-10-CM

## 2022-04-26 PROBLEM — R05.9 COUGH: Status: RESOLVED | Noted: 2020-03-03 | Resolved: 2022-04-26

## 2022-04-26 PROCEDURE — 1111F PR DISCHARGE MEDS RECONCILED W/ CURRENT OUTPATIENT MED LIST: ICD-10-PCS | Mod: CPTII,95,, | Performed by: NURSE PRACTITIONER

## 2022-04-26 PROCEDURE — 1111F DSCHRG MED/CURRENT MED MERGE: CPT | Mod: CPTII,95,, | Performed by: NURSE PRACTITIONER

## 2022-04-26 PROCEDURE — 99213 OFFICE O/P EST LOW 20 MIN: CPT | Mod: 95,,, | Performed by: NURSE PRACTITIONER

## 2022-04-26 PROCEDURE — 99213 PR OFFICE/OUTPT VISIT, EST, LEVL III, 20-29 MIN: ICD-10-PCS | Mod: 95,,, | Performed by: NURSE PRACTITIONER

## 2022-04-26 RX ORDER — GABAPENTIN 800 MG/1
800 TABLET ORAL 3 TIMES DAILY
Qty: 90 TABLET | Refills: 0 | Status: SHIPPED | OUTPATIENT
Start: 2022-04-26 | End: 2022-06-09 | Stop reason: SDUPTHER

## 2022-04-26 RX ORDER — BUTALBITAL, ACETAMINOPHEN AND CAFFEINE 50; 325; 40 MG/1; MG/1; MG/1
1 TABLET ORAL EVERY 6 HOURS PRN
Qty: 30 TABLET | Refills: 0 | Status: SHIPPED | OUTPATIENT
Start: 2022-04-26 | End: 2022-05-19 | Stop reason: SDUPTHER

## 2022-04-26 NOTE — ASSESSMENT & PLAN NOTE
Complete history and physical was completed today.  Complete and thorough medication reconciliation was performed.  Discussed risks and benefits of medications.  Advised patient on orders and health maintenance. Continue current medications listed on your summary sheet.

## 2022-04-26 NOTE — PROGRESS NOTES
Primary Care Telemedicine Note  The patient location is:  Patient's Home - [place]  The chief complaint leading to consultation is:   Chief Complaint   Patient presents with    Medication Refill      Total time: 25 minutes see MDM below. The total time spent on the encounter, which includes face to face time and non-face to face time preparing to see the patient (eg, review of previous medical records, tests), Obtaining and/or reviewing separately obtained history, documenting clinical information in the electronic or other health record, independently interpreting results (not separately reported)/communicating results to the patient/family/caregiver, and/or care coordination (not separately reported).    Visit type: Virtual visit with synchronous audio only and video  Each patient to whom he or she provides medical services by telemedicine is:  (1) informed of the relationship between the physician and patient and the respective role of any other health care provider with respect to management of the patient; and (2) notified that he or she may decline to receive medical services by telemedicine and may withdraw from such care at any time.  =================================================================    Assessment/Plan:  Problem List Items Addressed This Visit        Neuro    Chronic nonintractable headache    Overview     - chronic, stable  - she has taking Fioricet for several years. Requesting medication refill  - tolerated medication well in the past with no adverse effects reported   - she is following with neurology. Refill x1 month. Recommend close follow up with specialist.               Psychiatric    Anxiety and depression - Primary    Overview     - chronic, worsening   - recently restarted Buspar 5 mg BID and she does report mild improvement with medication   - recommend psychiatry referral for medication management and therapy; patient agreeable   -discussed anxiety condition  course  -discussed SSRI/SNRI as first-line treatment for this condition  -discussed risk of discontinuing this medication without tapering  -patient was educated, advised of side effects, and all questions were answered.  Patient voiced understanding  -patient will follow up routinely and notify us if having any side effects or worsening or persistent symptoms.  ER precautions were given.           Relevant Orders    Ambulatory referral/consult to Psychiatry    Methamphetamine abuse    Relevant Orders    Ambulatory referral/consult to Psychiatry       Orthopedic    Chronic bilateral low back pain without sciatica    Overview     This is a chronic problem. The problem occurs constantly. The problem is unchanged. The pain is present in the lumbar spine. The quality of the pain is described as aching. The pain does not radiate. The pain is at a severity of 6/10. The pain is moderate. Pertinent negatives include no abdominal pain, bladder incontinence, bowel incontinence, chest pain, dysuria, fever, headaches, leg pain, numbness, paresis, paresthesias, pelvic pain, perianal numbness, tingling, weakness or weight loss. She has taken gabapentin in the past with relief, requesting medication refill.             Relevant Medications    gabapentin (NEURONTIN) 800 MG tablet       Other    Encounter for long-term (current) use of medications    Overview       Patient is on CHRONIC long-term drug therapy for managed conditions. See medication list. Reports compliance.  No side effects reported.  Routine lab work is being monitored.  Patient does need refills today.     Lab Results   Component Value Date    WBC 7.04 05/20/2022    HGB 12.6 05/20/2022    HCT 37.5 05/20/2022    MCV 90 05/20/2022     05/20/2022         Chemistry        Component Value Date/Time     04/09/2019 0903    K 4.5 04/09/2019 0903    CL 99 04/09/2019 0903    CO2 31 (H) 04/09/2019 0903    BUN 11 04/09/2019 0903    CREATININE 0.7 04/09/2019 0903      (H) 02/24/2022 1007        Component Value Date/Time    CALCIUM 9.4 04/09/2019 0903    ALKPHOS 113 04/09/2019 0903    AST 32 04/09/2019 0903    ALT 32 04/09/2019 0903    BILITOT 0.3 04/09/2019 0903    ESTGFRAFRICA >60.0 04/09/2019 0903    EGFRNONAA >60.0 04/09/2019 0903          Lab Results   Component Value Date    TSH 0.827 11/02/2021    FREET4 1.31 03/04/2010              Current Assessment & Plan     Patient has not seen PCP since 2020. I recommend close follow up with PCP in 1 month. Complete history and physical was completed today.  Complete and thorough medication reconciliation was performed.  Discussed risks and benefits of medications. Continue current medications listed on your summary sheet.               Follow up in about 1 month (around 5/26/2022), or if symptoms worsen or fail to improve, for Follow up with Dr. Larson.    Sendy Mcrae NP  _____________________________________________________________________________________________________________________________________________________    CC: medication refill     HPI: Patient is a 31-year-old female who presents today as an established patient here for medication refill. She reports that she stopped her buspar, fioricet, and gabapentin while she was pregnant. She is s/p vaginal delivery on 4/8/22 with Dr. Landaverde (OBGYN). She is not currently breastfeeding and does not plan to. She would like to resume regular medications. Patient's last visit with PCP was 03/2020. Notified patient will refill medication x1 month, however, recommend she follow up for in-person exam for routine visit with PCP.     Past Medical History:  Past Medical History:   Diagnosis Date    Abnormal Pap smear     ALLERGIC RHINITIS     Anemia     Anxiety     Bipolar 1 disorder     Chronic back pain     Drug-seeking behavior 4/16/2012    Epilepsy     GERD (gastroesophageal reflux disease)     GERD (gastroesophageal reflux disease)     Hepatitis C 2018     Medications given     Opioid dependence 4/16/2012    Pre-eclampsia     Seizures     last episode 2020, EEG-normal;     Urinary tract infection      Past Surgical History:   Procedure Laterality Date    CHOLECYSTECTOMY      LAPAROSCOPIC LIGATION OF FALLOPIAN TUBE Bilateral 5/23/2022    Procedure: LIGATION, FALLOPIAN TUBE, LAPAROSCOPIC;  Surgeon: Kristian Landaverde MD;  Location: [facility];  Service: OB/GYN;  Laterality: Bilateral;    mirena removal  02/2018    PELVIC LAPAROSCOPY      iud retrieval     VAGINAL DELIVERY      times 4    vestibulectomy      WISDOM TOOTH EXTRACTION       Review of patient's allergies indicates:   Allergen Reactions    Darvocet a500 [propoxyphene n-acetaminophen]     Phenytoin sodium extended Other (See Comments)     Social History     Tobacco Use    Smoking status: Current Every Day Smoker     Packs/day: 1.00     Years: 10.00     Pack years: 10.00     Types: Cigarettes    Smokeless tobacco: Never Used   Substance Use Topics    Alcohol use: Not Currently     Comment: occasional    Drug use: Yes     Types: Hydrocodone, Methamphetamines     Comment: past histoy last 4/7/2022     Family History   Problem Relation Age of Onset    Stroke Mother     Depression Sister     Breast cancer Neg Hx     Ovarian cancer Neg Hx      Current Outpatient Medications on File Prior to Visit   Medication Sig Dispense Refill    busPIRone (BUSPAR) 5 MG Tab Take 1 tablet (5 mg total) by mouth 2 (two) times daily. 60 tablet 11    nicotine (NICODERM CQ) 14 mg/24 hr Place 1 patch onto the skin once daily. 28 patch 0    oxyCODONE-acetaminophen (PERCOCET) 5-325 mg per tablet Take 1 tablet by mouth every 4 (four) hours as needed for Pain. 20 tablet 0    [DISCONTINUED] levomefolate calcium (ELFOLATE) 15 mg Tab       [DISCONTINUED] QUEtiapine (SEROQUEL) 200 MG Tab Take 1 tablet (200 mg total) by mouth once daily. (Patient not taking: No sig reported) 10 tablet 0    [DISCONTINUED] ranitidine  (ZANTAC) 150 MG tablet Take 1 tablet (150 mg total) by mouth 2 (two) times daily as needed. 60 tablet 11     No current facility-administered medications on file prior to visit.     Review of Systems   Constitutional: Negative for activity change and unexpected weight change.   HENT: Negative for hearing loss, rhinorrhea and trouble swallowing.    Eyes: Negative for discharge and visual disturbance.   Respiratory: Negative for chest tightness and wheezing.    Cardiovascular: Negative for chest pain and palpitations.   Gastrointestinal: Negative for blood in stool, constipation, diarrhea and vomiting.   Endocrine: Negative for polydipsia and polyuria.   Genitourinary: Negative for difficulty urinating, dysuria, hematuria and menstrual problem.   Musculoskeletal: Positive for arthralgias. Negative for joint swelling and neck pain.   Neurological: Positive for headaches. Negative for weakness.   Psychiatric/Behavioral: Positive for dysphoric mood. Negative for confusion. The patient is nervous/anxious.      There were no vitals filed for this visit.    Wt Readings from Last 3 Encounters:   05/23/22 63.3 kg (139 lb 8.8 oz)   05/20/22 63.9 kg (140 lb 14 oz)   05/20/22 63.6 kg (140 lb 3.4 oz)     Physical Exam  Vitals reviewed.   Constitutional:       General: She is not in acute distress.     Appearance: Normal appearance. She is normal weight.   HENT:      Head: Normocephalic.      Right Ear: External ear normal.      Left Ear: External ear normal.      Mouth/Throat:      Mouth: Mucous membranes are moist.   Eyes:      Conjunctiva/sclera: Conjunctivae normal.   Pulmonary:      Effort: Pulmonary effort is normal. No respiratory distress.   Musculoskeletal:         General: No swelling. Normal range of motion.      Cervical back: Normal range of motion.   Skin:     Coloration: Skin is not pale.      Findings: No rash.   Neurological:      Mental Status: She is alert and oriented to person, place, and time. Mental status  is at baseline.   Psychiatric:         Mood and Affect: Mood normal.         Behavior: Behavior normal.         Judgment: Judgment normal.       Health Maintenance   Topic Date Due    TETANUS VACCINE  04/10/2032    Hepatitis C Screening  Completed    Lipid Panel  Completed

## 2022-04-30 PROBLEM — G89.29 CHRONIC NONINTRACTABLE HEADACHE: Status: ACTIVE | Noted: 2022-04-30

## 2022-04-30 PROBLEM — R51.9 CHRONIC NONINTRACTABLE HEADACHE: Status: ACTIVE | Noted: 2022-04-30

## 2022-05-02 DIAGNOSIS — R51.9 CHRONIC NONINTRACTABLE HEADACHE, UNSPECIFIED HEADACHE TYPE: ICD-10-CM

## 2022-05-02 DIAGNOSIS — G89.29 CHRONIC NONINTRACTABLE HEADACHE, UNSPECIFIED HEADACHE TYPE: ICD-10-CM

## 2022-05-02 RX ORDER — BUTALBITAL, ACETAMINOPHEN AND CAFFEINE 50; 325; 40 MG/1; MG/1; MG/1
1 TABLET ORAL EVERY 6 HOURS PRN
Qty: 30 TABLET | Refills: 0 | OUTPATIENT
Start: 2022-05-02 | End: 2022-06-01

## 2022-05-16 ENCOUNTER — PATIENT MESSAGE (OUTPATIENT)
Dept: OBSTETRICS AND GYNECOLOGY | Facility: CLINIC | Age: 32
End: 2022-05-16

## 2022-05-19 ENCOUNTER — PATIENT MESSAGE (OUTPATIENT)
Dept: OBSTETRICS AND GYNECOLOGY | Facility: CLINIC | Age: 32
End: 2022-05-19
Payer: MEDICAID

## 2022-05-19 ENCOUNTER — PATIENT OUTREACH (OUTPATIENT)
Dept: ADMINISTRATIVE | Facility: OTHER | Age: 32
End: 2022-05-19
Payer: MEDICAID

## 2022-05-19 DIAGNOSIS — R51.9 CHRONIC NONINTRACTABLE HEADACHE, UNSPECIFIED HEADACHE TYPE: ICD-10-CM

## 2022-05-19 DIAGNOSIS — G89.29 CHRONIC NONINTRACTABLE HEADACHE, UNSPECIFIED HEADACHE TYPE: ICD-10-CM

## 2022-05-19 RX ORDER — BUTALBITAL, ACETAMINOPHEN AND CAFFEINE 50; 325; 40 MG/1; MG/1; MG/1
1 TABLET ORAL EVERY 6 HOURS PRN
Qty: 10 TABLET | Refills: 0 | Status: SHIPPED | OUTPATIENT
Start: 2022-05-19 | End: 2022-06-09 | Stop reason: SDUPTHER

## 2022-05-19 NOTE — TELEPHONE ENCOUNTER
Pt has pre op appointment tomorrow and is requesting that her Firocet be refilled one more time. Please review and advise.

## 2022-05-19 NOTE — PROGRESS NOTES
Health Maintenance Due   Topic Date Due    Pneumococcal Vaccines (Age 0-64) (1 - PCV) Never done    COVID-19 Vaccine (2 - Pfizer series) 01/03/2022     Updates were requested from care everywhere.  Chart was reviewed for overdue Proactive [facility] Encounters ([facility]AD[facility]) topics (CRS, Breast Cancer Screening, Eye exam)  Health Maintenance has been updated.  LINKS immunization registry triggered.  Immunizations were reconciled.

## 2022-05-20 ENCOUNTER — OFFICE VISIT (OUTPATIENT)
Dept: OBSTETRICS AND GYNECOLOGY | Facility: CLINIC | Age: 32
End: 2022-05-20
Payer: MEDICAID

## 2022-05-20 ENCOUNTER — PATIENT MESSAGE (OUTPATIENT)
Dept: OBSTETRICS AND GYNECOLOGY | Facility: CLINIC | Age: 32
End: 2022-05-20

## 2022-05-20 VITALS
BODY MASS INDEX: 23.93 KG/M2 | DIASTOLIC BLOOD PRESSURE: 82 MMHG | HEIGHT: 64 IN | WEIGHT: 140.19 LBS | SYSTOLIC BLOOD PRESSURE: 118 MMHG

## 2022-05-20 DIAGNOSIS — Z30.2 ENCOUNTER FOR STERILIZATION: ICD-10-CM

## 2022-05-20 DIAGNOSIS — Z01.818 PREOP EXAMINATION: Primary | ICD-10-CM

## 2022-05-20 PROCEDURE — 99499 NO LOS: ICD-10-PCS | Mod: S$PBB,,, | Performed by: SPECIALIST

## 2022-05-20 PROCEDURE — 1159F PR MEDICATION LIST DOCUMENTED IN MEDICAL RECORD: ICD-10-PCS | Mod: CPTII,,, | Performed by: SPECIALIST

## 2022-05-20 PROCEDURE — 3008F PR BODY MASS INDEX (BMI) DOCUMENTED: ICD-10-PCS | Mod: CPTII,,, | Performed by: SPECIALIST

## 2022-05-20 PROCEDURE — 3008F BODY MASS INDEX DOCD: CPT | Mod: CPTII,,, | Performed by: SPECIALIST

## 2022-05-20 PROCEDURE — 3074F SYST BP LT 130 MM HG: CPT | Mod: CPTII,,, | Performed by: SPECIALIST

## 2022-05-20 PROCEDURE — 99499 UNLISTED E&M SERVICE: CPT | Mod: S$PBB,,, | Performed by: SPECIALIST

## 2022-05-20 PROCEDURE — 99999 PR PBB SHADOW E&M-EST. PATIENT-LVL III: ICD-10-PCS | Mod: PBBFAC,,, | Performed by: SPECIALIST

## 2022-05-20 PROCEDURE — 1159F MED LIST DOCD IN RCRD: CPT | Mod: CPTII,,, | Performed by: SPECIALIST

## 2022-05-20 PROCEDURE — 99213 OFFICE O/P EST LOW 20 MIN: CPT | Mod: PBBFAC,PN | Performed by: SPECIALIST

## 2022-05-20 PROCEDURE — 99999 PR PBB SHADOW E&M-EST. PATIENT-LVL III: CPT | Mod: PBBFAC,,, | Performed by: SPECIALIST

## 2022-05-20 PROCEDURE — 3079F PR MOST RECENT DIASTOLIC BLOOD PRESSURE 80-89 MM HG: ICD-10-PCS | Mod: CPTII,,, | Performed by: SPECIALIST

## 2022-05-20 PROCEDURE — 3074F PR MOST RECENT SYSTOLIC BLOOD PRESSURE < 130 MM HG: ICD-10-PCS | Mod: CPTII,,, | Performed by: SPECIALIST

## 2022-05-20 PROCEDURE — 3079F DIAST BP 80-89 MM HG: CPT | Mod: CPTII,,, | Performed by: SPECIALIST

## 2022-05-20 RX ORDER — SODIUM CHLORIDE 9 MG/ML
INJECTION, SOLUTION INTRAVENOUS CONTINUOUS
Status: CANCELLED | OUTPATIENT
Start: 2022-05-20

## 2022-05-20 RX ORDER — MUPIROCIN 20 MG/G
OINTMENT TOPICAL
Status: CANCELLED | OUTPATIENT
Start: 2022-05-20

## 2022-05-20 NOTE — PROGRESS NOTES
32 yo WF scheduled to undergo Lap tubal sterilization I discussed risks and benefits of procedure as well as failure of approx 1/200, increase ectopic rate and the fact this is a permanent procedure  Consents and orders obtained  Past Medical History:   Diagnosis Date    Abnormal Pap smear     ALLERGIC RHINITIS     Anemia     Chronic back pain     Drug-seeking behavior 4/16/2012    Epilepsy     GERD (gastroesophageal reflux disease)     Hepatitis C     Opioid dependence 4/16/2012    Pre-eclampsia     Seizures     last-10/2011, EEG-normal    Urinary tract infection        Past Surgical History:   Procedure Laterality Date    CHOLECYSTECTOMY      mirena removal  02/2018    PELVIC LAPAROSCOPY      iud retrieval     VAGINAL DELIVERY      times 4    vestibulectomy      WISDOM TOOTH EXTRACTION         Family History   Problem Relation Age of Onset    Stroke Mother     Depression Sister     Breast cancer Neg Hx     Ovarian cancer Neg Hx        Social History     Socioeconomic History    Marital status: Single   Tobacco Use    Smoking status: Current Every Day Smoker     Packs/day: 1.00     Years: 10.00     Pack years: 10.00     Types: Cigarettes    Smokeless tobacco: Never Used   Substance and Sexual Activity    Alcohol use: Not Currently     Comment: occasional    Drug use: Yes     Types: Hydrocodone, Methamphetamines    Sexual activity: Yes     Partners: Male     Birth control/protection: None       Current Outpatient Medications   Medication Sig Dispense Refill    busPIRone (BUSPAR) 5 MG Tab Take 1 tablet (5 mg total) by mouth 2 (two) times daily. 60 tablet 11    butalbital-acetaminophen-caffeine -40 mg (FIORICET, ESGIC) -40 mg per tablet Take 1 tablet by mouth every 6 (six) hours as needed for Headaches. 10 tablet 0    gabapentin (NEURONTIN) 800 MG tablet Take 1 tablet (800 mg total) by mouth 3 (three) times daily. 90 tablet 0    ibuprofen (ADVIL,MOTRIN) 600 MG tablet Take  1 tablet (600 mg total) by mouth every 6 (six) hours as needed for Pain. 30 tablet 0    levETIRAcetam (KEPPRA) 750 MG Tab TAKE 2 TABLETS BY MOUTH TWICE DAILY 240 tablet 0    VITAFOL GUMMIES 3.33 mg iron- 0.33 mg Chew Take 1 tablet by mouth 2 (two) times daily.      nicotine (NICODERM CQ) 14 mg/24 hr Place 1 patch onto the skin once daily. (Patient not taking: Reported on 5/20/2022) 28 patch 0     No current facility-administered medications for this visit.       Review of patient's allergies indicates:   Allergen Reactions    Darvocet a500 [propoxyphene n-acetaminophen]     Phenytoin sodium extended Other (See Comments)       Review of System:   General: no chills, fever, night sweats, weight gain or weight loss  Psychological: no depression or suicidal ideation  Breasts: no new or changing breast lumps, nipple discharge or masses.  Respiratory: no cough, shortness of breath, or wheezing  Cardiovascular: no chest pain or dyspnea on exertion  Gastrointestinal: no abdominal pain, change in bowel habits, or black or bloody stools  Genito-Urinary: no incontinence, urinary frequency/urgency or vulvar/vaginal symptoms, pelvic pain or abnormal vaginal bleeding.  Musculoskeletal: no gait disturbance or muscular weakness    PE  See Epic    Plan Will review preop results and proceed at [facility] as scheduled

## 2022-05-23 PROBLEM — Z30.2 STERILIZATION: Status: ACTIVE | Noted: 2020-03-03

## 2022-05-24 ENCOUNTER — PATIENT MESSAGE (OUTPATIENT)
Dept: OBSTETRICS AND GYNECOLOGY | Facility: CLINIC | Age: 32
End: 2022-05-24
Payer: MEDICAID

## 2022-05-25 ENCOUNTER — PATIENT MESSAGE (OUTPATIENT)
Dept: OBSTETRICS AND GYNECOLOGY | Facility: CLINIC | Age: 32
End: 2022-05-25
Payer: MEDICAID

## 2022-05-27 ENCOUNTER — PATIENT MESSAGE (OUTPATIENT)
Dept: NEUROLOGY | Facility: CLINIC | Age: 32
End: 2022-05-27
Payer: MEDICAID

## 2022-05-27 ENCOUNTER — PATIENT MESSAGE (OUTPATIENT)
Dept: FAMILY MEDICINE | Facility: CLINIC | Age: 32
End: 2022-05-27
Payer: MEDICAID

## 2022-05-27 DIAGNOSIS — M54.50 CHRONIC BILATERAL LOW BACK PAIN WITHOUT SCIATICA: ICD-10-CM

## 2022-05-27 DIAGNOSIS — G89.29 CHRONIC BILATERAL LOW BACK PAIN WITHOUT SCIATICA: ICD-10-CM

## 2022-05-27 PROBLEM — Z79.899 ENCOUNTER FOR LONG-TERM (CURRENT) USE OF MEDICATIONS: Status: ACTIVE | Noted: 2022-05-27

## 2022-05-27 RX ORDER — GABAPENTIN 800 MG/1
800 TABLET ORAL 3 TIMES DAILY
Qty: 90 TABLET | Refills: 2 | OUTPATIENT
Start: 2022-05-27

## 2022-05-27 NOTE — ASSESSMENT & PLAN NOTE
Patient has not seen PCP since 2020. I recommend close follow up with PCP in 1 month. Complete history and physical was completed today.  Complete and thorough medication reconciliation was performed.  Discussed risks and benefits of medications. Continue current medications listed on your summary sheet.

## 2022-05-27 NOTE — TELEPHONE ENCOUNTER
I filled her medications last month for x1 month. I recommended that she follow up with PCP or neurology. She has not seen her PCP since 2020 and she has not followed up with neurology.

## 2022-05-30 ENCOUNTER — PATIENT MESSAGE (OUTPATIENT)
Dept: OBSTETRICS AND GYNECOLOGY | Facility: CLINIC | Age: 32
End: 2022-05-30
Payer: MEDICAID

## 2022-05-30 ENCOUNTER — TELEPHONE (OUTPATIENT)
Dept: FAMILY MEDICINE | Facility: CLINIC | Age: 32
End: 2022-05-30
Payer: MEDICAID

## 2022-05-30 NOTE — TELEPHONE ENCOUNTER
Patient is a repetitive No Show.  She constantly sends in refill requests, has not seen Dr. Larson sin 2020 so Nurse Practitioner Sendy Mcrae filled patient medication for 1 month and advised that she follow up with PCP. Patient requested the appointment, but does not answer the phone to confirm appointments. wants to be removed as her PCP.

## 2022-05-31 ENCOUNTER — E-VISIT (OUTPATIENT)
Dept: FAMILY MEDICINE | Facility: CLINIC | Age: 32
End: 2022-05-31
Payer: MEDICAID

## 2022-05-31 ENCOUNTER — PATIENT MESSAGE (OUTPATIENT)
Dept: FAMILY MEDICINE | Facility: CLINIC | Age: 32
End: 2022-05-31

## 2022-05-31 ENCOUNTER — PATIENT MESSAGE (OUTPATIENT)
Dept: NEUROLOGY | Facility: CLINIC | Age: 32
End: 2022-05-31
Payer: MEDICAID

## 2022-05-31 ENCOUNTER — LAB VISIT (OUTPATIENT)
Dept: LAB | Facility: HOSPITAL | Age: 32
End: 2022-05-31
Attending: SPECIALIST
Payer: MEDICAID

## 2022-05-31 DIAGNOSIS — J06.9 UPPER RESPIRATORY TRACT INFECTION, UNSPECIFIED TYPE: Primary | ICD-10-CM

## 2022-05-31 DIAGNOSIS — F19.11: Primary | ICD-10-CM

## 2022-05-31 DIAGNOSIS — F19.11: ICD-10-CM

## 2022-05-31 LAB
AMPHET+METHAMPHET UR QL: NEGATIVE
BARBITURATES UR QL SCN>200 NG/ML: NEGATIVE
BENZODIAZ UR QL SCN>200 NG/ML: NEGATIVE
BZE UR QL SCN: NEGATIVE
CANNABINOIDS UR QL SCN: NEGATIVE
CREAT UR-MCNC: 80 MG/DL (ref 15–325)
ETHANOL UR-MCNC: <10 MG/DL
METHADONE UR QL SCN>300 NG/ML: NEGATIVE
OPIATES UR QL SCN: NEGATIVE
PCP UR QL SCN>25 NG/ML: NEGATIVE
TOXICOLOGY INFORMATION: NORMAL

## 2022-05-31 PROCEDURE — 80307 DRUG TEST PRSMV CHEM ANLYZR: CPT | Performed by: SPECIALIST

## 2022-05-31 PROCEDURE — 99422 PR E&M, ONLINE DIGIT, EST, < 7 DAYS,  11-20 MINS: ICD-10-PCS | Mod: ,,, | Performed by: FAMILY MEDICINE

## 2022-05-31 PROCEDURE — 99422 OL DIG E/M SVC 11-20 MIN: CPT | Mod: ,,, | Performed by: FAMILY MEDICINE

## 2022-05-31 NOTE — TELEPHONE ENCOUNTER
I provided a one month Rx in April to allow her time to schedule a follow up visit. It looks like she has not seen her PCP since 2020. She is also established with neurology. I recommend that if she is having worsening migraines that she follow up with neurology for further evaluation. I also recommend a follow up with PCP for further medication refills. ER precautions for severe symptoms.

## 2022-06-01 RX ORDER — PREDNISONE 20 MG/1
20 TABLET ORAL DAILY
Qty: 5 TABLET | Refills: 0 | Status: SHIPPED | OUTPATIENT
Start: 2022-06-01 | End: 2022-06-06

## 2022-06-01 RX ORDER — BENZONATATE 100 MG/1
100 CAPSULE ORAL 3 TIMES DAILY PRN
Qty: 30 CAPSULE | Refills: 0 | Status: SHIPPED | OUTPATIENT
Start: 2022-06-01 | End: 2022-09-12

## 2022-06-01 NOTE — PROGRESS NOTES
Patient ID: Sara Sanford is a 31 y.o. female.    Chief Complaint: URI    The patient initiated a request through [facility] on 5/31/2022 for evaluation and management with a chief complaint of URI     I evaluated the questionnaire submission on 06/01/2022  .    [facility] Peq Evisit Upper Respitatory/Cough Questionnaire    5/31/2022  4:47 PM CDT - Filed by Patient   Do you agree to participate in an E-Visit? Yes   What is the main issue that you would like for your doctor to address today? Medication refills   Are you able to take your vital signs? Yes   Systolic Blood Pressure: 106   Diastolic Blood Pressure: 62   Weight: 139   Height: 64   Pulse: 82   Temp: 98.6   Resp:    Pulse Ox:    What symptoms do you currently have?  Cough;  Headache;  Nasal Congestion   Have you had a fever? No   When did your symptoms first appear? 5/26/2022   In the last two weeks, have you been in close contact with someone who has COVID-19? No   In the last two weeks, have you worked or volunteered in a healthcare facility or as a ? Healthcare facilities include a hospital, medical or dental clinic, long-term care facility, or nursing home No   Do you live in a long-term care facility, nursing home, or homeless shelter? No   List what you have done or taken to help your symptoms. Over counter meds/ibuprofen   How severe are your symptoms? Moderate   Have you taken an at home Covid test? Yes   What were the results? Negative   Have you been fully vaccinated for COVID? (2 Pfizer, 2 Moderna or 1 Alberto & Alberto vaccine injections) No   Have you received your first dose of the Pfizer or Moderna COVID vaccine? Yes   Do you have transportation to get tested for COVID if it is indicated and ordered for you at an [facility] location? Yes   Provide any information you feel is important to your history not asked above Just had rapid test done as well   Please attach any relevant images or files         Active Problem List with Overview  Notes    Diagnosis Date Noted    Encounter for long-term (current) use of medications 05/27/2022       Patient is on CHRONIC long-term drug therapy for managed conditions. See medication list. Reports compliance.  No side effects reported.  Routine lab work is being monitored.  Patient does need refills today.     Lab Results   Component Value Date    WBC 7.04 05/20/2022    HGB 12.6 05/20/2022    HCT 37.5 05/20/2022    MCV 90 05/20/2022     05/20/2022         Chemistry        Component Value Date/Time     04/09/2019 0903    K 4.5 04/09/2019 0903    CL 99 04/09/2019 0903    CO2 31 (H) 04/09/2019 0903    BUN 11 04/09/2019 0903    CREATININE 0.7 04/09/2019 0903     (H) 02/24/2022 1007        Component Value Date/Time    CALCIUM 9.4 04/09/2019 0903    ALKPHOS 113 04/09/2019 0903    AST 32 04/09/2019 0903    ALT 32 04/09/2019 0903    BILITOT 0.3 04/09/2019 0903    ESTGFRAFRICA >60.0 04/09/2019 0903    EGFRNONAA >60.0 04/09/2019 0903          Lab Results   Component Value Date    TSH 0.827 11/02/2021    FREET4 1.31 03/04/2010         Chronic nonintractable headache 04/30/2022     - chronic, stable  - she has taking Fioricet for several years. Requesting medication refill  - tolerated medication well in the past with no adverse effects reported   - she is following with neurology. Refill x1 month. Recommend close follow up with specialist.       Normal labor and delivery 04/10/2022    Chronic migraine 03/26/2020    Sterilization 03/03/2020     CHRONIC. Stable. Compliant with medications for managed conditions. See medication list. No SE reported.   Routine lab analysis is being monitored. Refills were addressed.  Lab Results   Component Value Date    WBC 5.21 01/17/2018    HGB 13.0 01/17/2018    HCT 38.0 01/17/2018    MCV 96 01/17/2018     01/17/2018         Chemistry        Component Value Date/Time     04/09/2019 0903    K 4.5 04/09/2019 0903    CL 99 04/09/2019 0903    CO2 31 (H)  04/09/2019 0903    BUN 11 04/09/2019 0903    CREATININE 0.7 04/09/2019 0903    GLU 94 04/09/2019 0903        Component Value Date/Time    CALCIUM 9.4 04/09/2019 0903    ALKPHOS 113 04/09/2019 0903    AST 32 04/09/2019 0903    ALT 32 04/09/2019 0903    BILITOT 0.3 04/09/2019 0903    ESTGFRAFRICA >60.0 04/09/2019 0903    EGFRNONAA >60.0 04/09/2019 0903          Lab Results   Component Value Date    TSH 1.549 07/06/2017    FREET4 1.31 03/04/2010           Chronic hepatitis C without hepatic coma 03/03/2020     Chronic.  Control is uncertain.  Patient reports having viral load tested outside lab.  Requesting records.  Patient currently on Epclusa reports intermittent compliance with treatment.  Checking level.  Patient has not had ultrasound that was ordered previously performed.      Gabapentin overdose 03/03/2020    Personal history of high risk medication treatment 03/03/2020     See .  Requesting records from Addiction Medicine and Neurology.  Patient reports that she was recently discharged out of previous Neurology Clinic for no-shows.      Methamphetamine abuse 03/03/2020    Overdose 08/21/2018    Substance abuse 08/21/2018    Chronic bilateral low back pain without sciatica 09/12/2016     This is a chronic problem. The problem occurs constantly. The problem is unchanged. The pain is present in the lumbar spine. The quality of the pain is described as aching. The pain does not radiate. The pain is at a severity of 6/10. The pain is moderate. Pertinent negatives include no abdominal pain, bladder incontinence, bowel incontinence, chest pain, dysuria, fever, headaches, leg pain, numbness, paresis, paresthesias, pelvic pain, perianal numbness, tingling, weakness or weight loss. She has taken gabapentin in the past with relief, requesting medication refill.        Chronic back pain 08/19/2014     Overview:   ICD-10 Transition      Tobacco dependence syndrome 08/19/2014    Seizures 08/18/2014    Pelvic  pain in female 03/25/2013    Opioid dependence with opioid-induced disorder 04/16/2012     Chronic.  Control is uncertain.  Requesting records from Pain Management, Neurology, Addiction Medicine.  Patient advised that she has high scores on .   reviewed.  Patient will be referred to primary care Behavioral Health for high risk medication history and dependence.      Anxiety and depression 02/07/2012     - chronic, worsening   - recently restarted Buspar 5 mg BID and she does report mild improvement with medication   - recommend psychiatry referral for medication management and therapy; patient agreeable   -discussed anxiety condition course  -discussed SSRI/SNRI as first-line treatment for this condition  -discussed risk of discontinuing this medication without tapering  -patient was educated, advised of side effects, and all questions were answered.  Patient voiced understanding  -patient will follow up routinely and notify us if having any side effects or worsening or persistent symptoms.  ER precautions were given.      Papanicolaou smear of cervix with low grade squamous intraepithelial lesion (LGSIL) 08/15/2011    Epilepsy 10/18/2010     Overview:   ICD-10 Transition      Cervical high risk human papillomavirus (HPV) DNA test positive 12/13/2009      Recent Labs Obtained:  Lab Visit on 05/31/2022   Component Date Value Ref Range Status    Alcohol, Urine 05/31/2022 <10  <10 mg/dL Final    Benzodiazepines 05/31/2022 Negative  Negative Final    Methadone metabolites 05/31/2022 Negative  Negative Final    Cocaine (Metab.) 05/31/2022 Negative  Negative Final    Opiate Scrn, Ur 05/31/2022 Negative  Negative Final    Barbiturate Screen, Ur 05/31/2022 Negative  Negative Final    Amphetamine Screen, Ur 05/31/2022 Negative  Negative Final    THC 05/31/2022 Negative  Negative Final    Phencyclidine 05/31/2022 Negative  Negative Final    Creatinine, Urine 05/31/2022 80.0  15.0 - 325.0 mg/dL Final     Toxicology Information 05/31/2022 SEE COMMENT   Final    Comment: This screen includes the following classes of drugs at the listed   cut-off:    Benzodiazepines 200 ng/ml  Methadone 300 ng/ml  Cocaine metabolite 300 ng/ml  Opiates 300 ng/ml  Barbiturates 200 ng/ml  Amphetamines 1000 ng/ml  Marijuana metabs (THC) 50 ng/ml  Phencyclidine (PCP) 25 ng/ml    This is a screening test. If results do not correlate with clinical   presentation, then a confirmatory send out test is advised.     This report is intended for use in clinical monitoring and management   of   patients. It is not intended for use in employment related drug   testing.         Encounter Diagnosis   Name Primary?    Upper respiratory tract infection, unspecified type Yes        No orders of the defined types were placed in this encounter.     Medications Ordered This Encounter   Medications    benzonatate (TESSALON) 100 MG capsule     Sig: Take 1 capsule (100 mg total) by mouth 3 (three) times daily as needed for Cough.     Dispense:  30 capsule     Refill:  0    predniSONE (DELTASONE) 20 MG tablet     Sig: Take 1 tablet (20 mg total) by mouth once daily. for 5 days     Dispense:  5 tablet     Refill:  0        E-Visit Time Tracking:    Day 1 Time (in minutes): 11     Total Time (in minutes): 11

## 2022-06-01 NOTE — PATIENT INSTRUCTIONS
Follow up if symptoms worsen or fail to improve.         If no improvement in symptoms or symptoms worsen, please be advised to call MD, follow-up at clinic and/or go to ER if becomes severe.

## 2022-06-02 ENCOUNTER — PATIENT MESSAGE (OUTPATIENT)
Dept: FAMILY MEDICINE | Facility: CLINIC | Age: 32
End: 2022-06-02
Payer: MEDICAID

## 2022-06-02 ENCOUNTER — PATIENT MESSAGE (OUTPATIENT)
Dept: OBSTETRICS AND GYNECOLOGY | Facility: CLINIC | Age: 32
End: 2022-06-02
Payer: MEDICAID

## 2022-06-02 NOTE — TELEPHONE ENCOUNTER
Patient will need an office visit for migraines.  The patient's last visit with me was on 3/3/2020.

## 2022-06-04 ENCOUNTER — PATIENT MESSAGE (OUTPATIENT)
Dept: FAMILY MEDICINE | Facility: CLINIC | Age: 32
End: 2022-06-04
Payer: MEDICAID

## 2022-06-09 ENCOUNTER — OFFICE VISIT (OUTPATIENT)
Dept: OBSTETRICS AND GYNECOLOGY | Facility: CLINIC | Age: 32
End: 2022-06-09
Payer: MEDICAID

## 2022-06-09 VITALS
BODY MASS INDEX: 24.92 KG/M2 | SYSTOLIC BLOOD PRESSURE: 122 MMHG | HEIGHT: 64 IN | WEIGHT: 145.94 LBS | DIASTOLIC BLOOD PRESSURE: 84 MMHG

## 2022-06-09 DIAGNOSIS — R51.9 CHRONIC NONINTRACTABLE HEADACHE, UNSPECIFIED HEADACHE TYPE: ICD-10-CM

## 2022-06-09 DIAGNOSIS — G89.29 CHRONIC BILATERAL LOW BACK PAIN WITHOUT SCIATICA: ICD-10-CM

## 2022-06-09 DIAGNOSIS — G89.29 CHRONIC NONINTRACTABLE HEADACHE, UNSPECIFIED HEADACHE TYPE: ICD-10-CM

## 2022-06-09 DIAGNOSIS — M54.50 CHRONIC BILATERAL LOW BACK PAIN WITHOUT SCIATICA: ICD-10-CM

## 2022-06-09 PROCEDURE — 99213 OFFICE O/P EST LOW 20 MIN: CPT | Mod: PBBFAC,PN | Performed by: SPECIALIST

## 2022-06-09 PROCEDURE — 1159F MED LIST DOCD IN RCRD: CPT | Mod: CPTII,,, | Performed by: SPECIALIST

## 2022-06-09 PROCEDURE — 99024 PR POST-OP FOLLOW-UP VISIT: ICD-10-PCS | Mod: ,,, | Performed by: SPECIALIST

## 2022-06-09 PROCEDURE — 99999 PR PBB SHADOW E&M-EST. PATIENT-LVL III: ICD-10-PCS | Mod: PBBFAC,,, | Performed by: SPECIALIST

## 2022-06-09 PROCEDURE — 3008F PR BODY MASS INDEX (BMI) DOCUMENTED: ICD-10-PCS | Mod: CPTII,,, | Performed by: SPECIALIST

## 2022-06-09 PROCEDURE — 3074F PR MOST RECENT SYSTOLIC BLOOD PRESSURE < 130 MM HG: ICD-10-PCS | Mod: CPTII,,, | Performed by: SPECIALIST

## 2022-06-09 PROCEDURE — 3079F PR MOST RECENT DIASTOLIC BLOOD PRESSURE 80-89 MM HG: ICD-10-PCS | Mod: CPTII,,, | Performed by: SPECIALIST

## 2022-06-09 PROCEDURE — 99024 POSTOP FOLLOW-UP VISIT: CPT | Mod: ,,, | Performed by: SPECIALIST

## 2022-06-09 PROCEDURE — 3079F DIAST BP 80-89 MM HG: CPT | Mod: CPTII,,, | Performed by: SPECIALIST

## 2022-06-09 PROCEDURE — 99999 PR PBB SHADOW E&M-EST. PATIENT-LVL III: CPT | Mod: PBBFAC,,, | Performed by: SPECIALIST

## 2022-06-09 PROCEDURE — 3008F BODY MASS INDEX DOCD: CPT | Mod: CPTII,,, | Performed by: SPECIALIST

## 2022-06-09 PROCEDURE — 1159F PR MEDICATION LIST DOCUMENTED IN MEDICAL RECORD: ICD-10-PCS | Mod: CPTII,,, | Performed by: SPECIALIST

## 2022-06-09 PROCEDURE — 3074F SYST BP LT 130 MM HG: CPT | Mod: CPTII,,, | Performed by: SPECIALIST

## 2022-06-09 RX ORDER — BUTALBITAL, ACETAMINOPHEN AND CAFFEINE 50; 325; 40 MG/1; MG/1; MG/1
1 TABLET ORAL EVERY 6 HOURS PRN
Qty: 10 TABLET | Refills: 0 | Status: SHIPPED | OUTPATIENT
Start: 2022-06-09 | End: 2022-07-05 | Stop reason: SDUPTHER

## 2022-06-09 RX ORDER — GABAPENTIN 800 MG/1
800 TABLET ORAL 3 TIMES DAILY
Qty: 90 TABLET | Refills: 0 | Status: SHIPPED | OUTPATIENT
Start: 2022-06-09

## 2022-06-09 NOTE — PROGRESS NOTES
30 yo WF 2 weeks s/p Lap BTL  Pt doing well and denies pain, f/c, n/v  Past Medical History:   Diagnosis Date    Abnormal Pap smear     ALLERGIC RHINITIS     Anemia     Anxiety     Bipolar 1 disorder     Chronic back pain     Drug-seeking behavior 4/16/2012    Epilepsy     GERD (gastroesophageal reflux disease)     GERD (gastroesophageal reflux disease)     Hepatitis C 2018    Medications given     Opioid dependence 4/16/2012    Pre-eclampsia     Seizures     last episode 2020, EEG-normal;     Urinary tract infection        Past Surgical History:   Procedure Laterality Date    CHOLECYSTECTOMY      LAPAROSCOPIC LIGATION OF FALLOPIAN TUBE Bilateral 5/23/2022    Procedure: LIGATION, FALLOPIAN TUBE, LAPAROSCOPIC;  Surgeon: Kristian Landaverde MD;  Location: [facility];  Service: OB/GYN;  Laterality: Bilateral;    mirena removal  02/2018    PELVIC LAPAROSCOPY      iud retrieval     VAGINAL DELIVERY      times 4    vestibulectomy      WISDOM TOOTH EXTRACTION         Family History   Problem Relation Age of Onset    Stroke Mother     Depression Sister     Breast cancer Neg Hx     Ovarian cancer Neg Hx        Social History     Socioeconomic History    Marital status: Single   Tobacco Use    Smoking status: Current Every Day Smoker     Packs/day: 1.00     Years: 10.00     Pack years: 10.00     Types: Cigarettes    Smokeless tobacco: Never Used   Substance and Sexual Activity    Alcohol use: Not Currently     Comment: occasional    Drug use: Yes     Types: Hydrocodone, Methamphetamines     Comment: past histoy last 4/7/2022    Sexual activity: Yes     Partners: Male     Birth control/protection: None       Current Outpatient Medications   Medication Sig Dispense Refill    benzonatate (TESSALON) 100 MG capsule Take 1 capsule (100 mg total) by mouth 3 (three) times daily as needed for Cough. 30 capsule 0    busPIRone (BUSPAR) 5 MG Tab Take 1 tablet (5 mg total) by mouth 2 (two) times daily. 60  tablet 11    butalbital-acetaminophen-caffeine -40 mg (FIORICET, ESGIC) -40 mg per tablet Take 1 tablet by mouth every 6 (six) hours as needed for Headaches. 10 tablet 0    gabapentin (NEURONTIN) 800 MG tablet Take 1 tablet (800 mg total) by mouth 3 (three) times daily. 90 tablet 0    levETIRAcetam (KEPPRA) 750 MG Tab TAKE 2 TABLETS BY MOUTH TWICE DAILY (Patient taking differently: Take 1,500 mg by mouth 2 (two) times daily.) 240 tablet 0    nicotine (NICODERM CQ) 14 mg/24 hr Place 1 patch onto the skin once daily. 28 patch 0    pantoprazole (PROTONIX) 40 MG tablet Take 40 mg by mouth once daily.      VITAFOL GUMMIES 3.33 mg iron- 0.33 mg Chew Take 1 tablet by mouth 2 (two) times daily.       No current facility-administered medications for this visit.       Review of patient's allergies indicates:   Allergen Reactions    Darvocet a500 [propoxyphene n-acetaminophen]     Phenytoin sodium extended Other (See Comments)       Review of System:   General: no chills, fever, night sweats, weight gain or weight loss  Psychological: no depression or suicidal ideation  Breasts: no new or changing breast lumps, nipple discharge or masses.  Respiratory: no cough, shortness of breath, or wheezing  Cardiovascular: no chest pain or dyspnea on exertion  Gastrointestinal: no abdominal pain, change in bowel habits, or black or bloody stools  Genito-Urinary: no incontinence, urinary frequency/urgency or vulvar/vaginal symptoms, pelvic pain or abnormal vaginal bleeding.  Musculoskeletal: no gait disturbance or muscular weakness    VSS  Abdomen soft Incisions well approx No s/s infection  Sutures removed    Plan RTO 1 year/prn       Gabapentin and Fiorict one refill then will be followed by PCP

## 2022-07-04 ENCOUNTER — PATIENT MESSAGE (OUTPATIENT)
Dept: FAMILY MEDICINE | Facility: CLINIC | Age: 32
End: 2022-07-04
Payer: MEDICAID

## 2022-07-04 DIAGNOSIS — G89.29 CHRONIC NONINTRACTABLE HEADACHE, UNSPECIFIED HEADACHE TYPE: ICD-10-CM

## 2022-07-04 DIAGNOSIS — R51.9 CHRONIC NONINTRACTABLE HEADACHE, UNSPECIFIED HEADACHE TYPE: ICD-10-CM

## 2022-07-05 ENCOUNTER — PATIENT MESSAGE (OUTPATIENT)
Dept: FAMILY MEDICINE | Facility: CLINIC | Age: 32
End: 2022-07-05
Payer: MEDICAID

## 2022-07-05 RX ORDER — BUTALBITAL, ACETAMINOPHEN AND CAFFEINE 50; 325; 40 MG/1; MG/1; MG/1
1 TABLET ORAL EVERY 6 HOURS PRN
Qty: 10 TABLET | Refills: 0 | Status: SHIPPED | OUTPATIENT
Start: 2022-07-05 | End: 2022-08-04

## 2022-07-31 ENCOUNTER — PATIENT MESSAGE (OUTPATIENT)
Dept: FAMILY MEDICINE | Facility: CLINIC | Age: 32
End: 2022-07-31
Payer: MEDICAID

## 2022-08-01 NOTE — TELEPHONE ENCOUNTER
Can you schedule this pt to day with holden sneed np virtually for 140 pm med refill? Spot held. Thank you

## 2022-08-04 ENCOUNTER — PATIENT MESSAGE (OUTPATIENT)
Dept: FAMILY MEDICINE | Facility: CLINIC | Age: 32
End: 2022-08-04
Payer: MEDICAID

## 2022-09-03 ENCOUNTER — PATIENT MESSAGE (OUTPATIENT)
Dept: FAMILY MEDICINE | Facility: CLINIC | Age: 32
End: 2022-09-03
Payer: MEDICAID

## 2022-09-12 ENCOUNTER — OFFICE VISIT (OUTPATIENT)
Dept: FAMILY MEDICINE | Facility: CLINIC | Age: 32
End: 2022-09-12
Payer: MEDICAID

## 2022-09-12 VITALS
SYSTOLIC BLOOD PRESSURE: 118 MMHG | WEIGHT: 142.94 LBS | OXYGEN SATURATION: 97 % | RESPIRATION RATE: 18 BRPM | BODY MASS INDEX: 24.4 KG/M2 | DIASTOLIC BLOOD PRESSURE: 60 MMHG | TEMPERATURE: 98 F | HEIGHT: 64 IN | HEART RATE: 92 BPM

## 2022-09-12 DIAGNOSIS — Z13.220 ENCOUNTER FOR LIPID SCREENING FOR CARDIOVASCULAR DISEASE: ICD-10-CM

## 2022-09-12 DIAGNOSIS — Z23 NEED FOR PNEUMOCOCCAL VACCINE: ICD-10-CM

## 2022-09-12 DIAGNOSIS — Z79.899 ENCOUNTER FOR LONG-TERM (CURRENT) USE OF MEDICATIONS: ICD-10-CM

## 2022-09-12 DIAGNOSIS — F17.200 TOBACCO DEPENDENCE SYNDROME: ICD-10-CM

## 2022-09-12 DIAGNOSIS — R56.9 SEIZURES: ICD-10-CM

## 2022-09-12 DIAGNOSIS — G47.00 INSOMNIA, UNSPECIFIED TYPE: ICD-10-CM

## 2022-09-12 DIAGNOSIS — R05.9 COUGH: ICD-10-CM

## 2022-09-12 DIAGNOSIS — J06.9 VIRAL UPPER RESPIRATORY INFECTION: ICD-10-CM

## 2022-09-12 DIAGNOSIS — Z00.00 WELL ADULT EXAM: Primary | ICD-10-CM

## 2022-09-12 DIAGNOSIS — Z13.6 ENCOUNTER FOR LIPID SCREENING FOR CARDIOVASCULAR DISEASE: ICD-10-CM

## 2022-09-12 PROCEDURE — 1160F PR REVIEW ALL MEDS BY PRESCRIBER/CLIN PHARMACIST DOCUMENTED: ICD-10-PCS | Mod: CPTII,,, | Performed by: FAMILY MEDICINE

## 2022-09-12 PROCEDURE — 99395 PREV VISIT EST AGE 18-39: CPT | Mod: S$PBB,,, | Performed by: FAMILY MEDICINE

## 2022-09-12 PROCEDURE — 99999 PR PBB SHADOW E&M-EST. PATIENT-LVL V: ICD-10-PCS | Mod: PBBFAC,,, | Performed by: FAMILY MEDICINE

## 2022-09-12 PROCEDURE — 3074F PR MOST RECENT SYSTOLIC BLOOD PRESSURE < 130 MM HG: ICD-10-PCS | Mod: CPTII,,, | Performed by: FAMILY MEDICINE

## 2022-09-12 PROCEDURE — 99215 OFFICE O/P EST HI 40 MIN: CPT | Mod: PBBFAC,PO | Performed by: FAMILY MEDICINE

## 2022-09-12 PROCEDURE — 3078F DIAST BP <80 MM HG: CPT | Mod: CPTII,,, | Performed by: FAMILY MEDICINE

## 2022-09-12 PROCEDURE — 99395 PR PREVENTIVE VISIT,EST,18-39: ICD-10-PCS | Mod: S$PBB,,, | Performed by: FAMILY MEDICINE

## 2022-09-12 PROCEDURE — 1159F PR MEDICATION LIST DOCUMENTED IN MEDICAL RECORD: ICD-10-PCS | Mod: CPTII,,, | Performed by: FAMILY MEDICINE

## 2022-09-12 PROCEDURE — 99999 PR PBB SHADOW E&M-EST. PATIENT-LVL V: CPT | Mod: PBBFAC,,, | Performed by: FAMILY MEDICINE

## 2022-09-12 PROCEDURE — 1160F RVW MEDS BY RX/DR IN RCRD: CPT | Mod: CPTII,,, | Performed by: FAMILY MEDICINE

## 2022-09-12 PROCEDURE — 1159F MED LIST DOCD IN RCRD: CPT | Mod: CPTII,,, | Performed by: FAMILY MEDICINE

## 2022-09-12 PROCEDURE — 3078F PR MOST RECENT DIASTOLIC BLOOD PRESSURE < 80 MM HG: ICD-10-PCS | Mod: CPTII,,, | Performed by: FAMILY MEDICINE

## 2022-09-12 PROCEDURE — 3008F BODY MASS INDEX DOCD: CPT | Mod: CPTII,,, | Performed by: FAMILY MEDICINE

## 2022-09-12 PROCEDURE — 3074F SYST BP LT 130 MM HG: CPT | Mod: CPTII,,, | Performed by: FAMILY MEDICINE

## 2022-09-12 PROCEDURE — 3008F PR BODY MASS INDEX (BMI) DOCUMENTED: ICD-10-PCS | Mod: CPTII,,, | Performed by: FAMILY MEDICINE

## 2022-09-12 RX ORDER — BUSPIRONE HYDROCHLORIDE 10 MG/1
10 TABLET ORAL DAILY
COMMUNITY
End: 2023-03-01 | Stop reason: SDUPTHER

## 2022-09-12 RX ORDER — BUTALBITAL, ACETAMINOPHEN AND CAFFEINE 50; 325; 40 MG/1; MG/1; MG/1
1 TABLET ORAL EVERY 4 HOURS PRN
COMMUNITY
End: 2022-09-12 | Stop reason: SDUPTHER

## 2022-09-12 RX ORDER — PANTOPRAZOLE SODIUM 40 MG/1
40 TABLET, DELAYED RELEASE ORAL DAILY
Qty: 90 TABLET | Refills: 4 | Status: SHIPPED | OUTPATIENT
Start: 2022-09-12 | End: 2023-01-12 | Stop reason: SDUPTHER

## 2022-09-12 RX ORDER — BUTALBITAL, ACETAMINOPHEN AND CAFFEINE 50; 325; 40 MG/1; MG/1; MG/1
1 TABLET ORAL EVERY 12 HOURS PRN
Qty: 30 TABLET | Refills: 0 | Status: SHIPPED | OUTPATIENT
Start: 2022-09-12 | End: 2022-10-20 | Stop reason: SDUPTHER

## 2022-09-12 RX ORDER — QUETIAPINE FUMARATE 200 MG/1
200 TABLET, FILM COATED ORAL NIGHTLY
COMMUNITY
Start: 2022-07-26 | End: 2022-11-30

## 2022-09-12 RX ORDER — PREDNISONE 20 MG/1
20 TABLET ORAL DAILY
Qty: 5 TABLET | Refills: 0 | Status: SHIPPED | OUTPATIENT
Start: 2022-09-12 | End: 2022-09-17

## 2022-09-12 RX ORDER — LEVETIRACETAM 750 MG/1
1500 TABLET ORAL 2 TIMES DAILY
Qty: 240 TABLET | Refills: 0 | Status: SHIPPED | OUTPATIENT
Start: 2022-09-12 | End: 2022-10-20 | Stop reason: SDUPTHER

## 2022-09-12 RX ORDER — LAMOTRIGINE 25 MG/1
50 TABLET ORAL DAILY
COMMUNITY
Start: 2022-07-26 | End: 2022-11-30

## 2022-09-12 RX ORDER — SERTRALINE HYDROCHLORIDE 50 MG/1
50 TABLET, FILM COATED ORAL DAILY
COMMUNITY
Start: 2022-07-26 | End: 2023-01-27

## 2022-09-12 RX ORDER — PNV 112/IRON/FOLIC/OM3/DHA/EPA 3.33-.33MG
2 TABLET,CHEWABLE ORAL DAILY
Qty: 180 TABLET | Refills: 4 | Status: SHIPPED | OUTPATIENT
Start: 2022-09-12 | End: 2023-01-12 | Stop reason: SDUPTHER

## 2022-09-12 NOTE — PROGRESS NOTES
This note is specifically for wellness visit performed today.   WELLNESS EXAM      Patient ID: Sara Sanford is a 31 y.o. female with  has a past medical history of Abnormal Pap smear, ALLERGIC RHINITIS, Anemia, Anxiety, Bipolar 1 disorder, Chronic back pain, Drug-seeking behavior (4/16/2012), Epilepsy, GERD (gastroesophageal reflux disease), GERD (gastroesophageal reflux disease), Hepatitis C (2018), Opioid dependence (4/16/2012), Pre-eclampsia, Seizures, and Urinary tract infection.   Chief Complaint:  Encounter for wellness exam    Well Adult Physical: Patient here for a comprehensive physical exam.The patient reports Chronic problems.  The patient's last visit with me was on 3/3/2020.    Reviewed Care team:  Neurology prev Dr. Barrios., now Julieta Lazcano MD  Psychiatry Emmanuelle Padgett MD   OBGYN Kristian Landaverde MD    September 2022:  Patient has been lost to follow-up since 2020.  She is here today for medication refills.  Patient reports that she is seeing OBGYN and Psychiatry.    Patient reports she is currently dealing with a cough and upper respiratory symptoms for the last four days.  No nausea vomiting diarrhea fever chills night sweats etc..  She has tried over-the-counter medications with some improvement.    She reports that she has a difficulty sleeping.  Patient is on Seroquel 200 milligrams at bedtime.  Patient follows with Psychiatry.  She has tried over-the-counter medications and melatonin before.    Do you take any herbs or supplements that were not prescribed by a doctor? no   Are you taking calcium supplements? no    History:   LMP: Patient's last menstrual period was 09/01/2022.   MMG:  No relevant family history has been documented.   Not indicated  PAP: 6/1/2021    Colon cancer screening:   Not indicated    Health Maintenance Topics with due status: Not Due       Topic Last Completion Date    Cervical Cancer Screening 08/19/2021    TETANUS VACCINE 04/10/2022       ==============================================  History reviewed.   Health Maintenance Due   Topic Date Due    Pneumococcal Vaccines (Age 0-64) (1 - PCV) Never done    COVID-19 Vaccine (2 - Pfizer series) 01/03/2022    Influenza Vaccine (1) 09/01/2022       Past Medical History:  Past Medical History:   Diagnosis Date    Abnormal Pap smear     ALLERGIC RHINITIS     Anemia     Anxiety     Bipolar 1 disorder     Chronic back pain     Drug-seeking behavior 4/16/2012    Epilepsy     GERD (gastroesophageal reflux disease)     GERD (gastroesophageal reflux disease)     Hepatitis C 2018    Medications given     Opioid dependence 4/16/2012    Pre-eclampsia     Seizures     last episode 2020, EEG-normal;     Urinary tract infection      Past Surgical History:   Procedure Laterality Date    CHOLECYSTECTOMY      LAPAROSCOPIC LIGATION OF FALLOPIAN TUBE Bilateral 5/23/2022    Procedure: LIGATION, FALLOPIAN TUBE, LAPAROSCOPIC;  Surgeon: Kristian Landaverde MD;  Location: [facility];  Service: OB/GYN;  Laterality: Bilateral;    mirena removal  02/2018    PELVIC LAPAROSCOPY      iud retrieval     VAGINAL DELIVERY      times 4    vestibulectomy      WISDOM TOOTH EXTRACTION       Review of patient's allergies indicates:   Allergen Reactions    Darvocet a500 [propoxyphene n-acetaminophen]     Phenytoin sodium extended Other (See Comments)     Current Outpatient Medications on File Prior to Visit   Medication Sig Dispense Refill    gabapentin (NEURONTIN) 800 MG tablet Take 1 tablet (800 mg total) by mouth 3 (three) times daily. 90 tablet 0    lamoTRIgine (LAMICTAL) 25 MG tablet Take 50 mg by mouth once daily.      QUEtiapine (SEROQUEL) 200 MG Tab Take 200 mg by mouth every evening.      sertraline (ZOLOFT) 50 MG tablet Take 50 mg by mouth once daily.      [DISCONTINUED] busPIRone (BUSPAR) 5 MG Tab Take 1 tablet (5 mg total) by mouth 2 (two) times daily. (Patient taking differently: Take 10 mg by mouth 2  (two) times daily.) 60 tablet 11    [DISCONTINUED] butalbital-acetaminophen-caffeine -40 mg (FIORICET, ESGIC) -40 mg per tablet Take 1 tablet by mouth every 4 (four) hours as needed for Headaches (Migraines).      [DISCONTINUED] levETIRAcetam (KEPPRA) 750 MG Tab TAKE 2 TABLETS BY MOUTH TWICE DAILY (Patient taking differently: Take 1,500 mg by mouth 2 (two) times daily.) 240 tablet 0    [DISCONTINUED] pantoprazole (PROTONIX) 40 MG tablet Take 40 mg by mouth once daily.      [DISCONTINUED] VITAFOL GUMMIES 3.33 mg iron- 0.33 mg Chew Take 1 tablet by mouth 2 (two) times daily.      busPIRone (BUSPAR) 10 MG tablet Take 10 mg by mouth 2 (two) times daily.      [DISCONTINUED] benzonatate (TESSALON) 100 MG capsule Take 1 capsule (100 mg total) by mouth 3 (three) times daily as needed for Cough. 30 capsule 0    [DISCONTINUED] levomefolate calcium (ELFOLATE) 15 mg Tab       [DISCONTINUED] nicotine (NICODERM CQ) 14 mg/24 hr Place 1 patch onto the skin once daily. 28 patch 0    [DISCONTINUED] ranitidine (ZANTAC) 150 MG tablet Take 1 tablet (150 mg total) by mouth 2 (two) times daily as needed. 60 tablet 11     No current facility-administered medications on file prior to visit.     Social History     Socioeconomic History    Marital status: Single   Tobacco Use    Smoking status: Every Day     Packs/day: 0.50     Years: 10.00     Pack years: 5.00     Types: Cigarettes, Vaping with nicotine     Passive exposure: Never    Smokeless tobacco: Never    Tobacco comments:     Trying to quit   Substance and Sexual Activity    Alcohol use: Not Currently     Comment: occasional    Drug use: Not Currently     Types: Hydrocodone     Comment: past histoy last 4/7/2022    Sexual activity: Yes     Partners: Male     Birth control/protection: None     Family History   Problem Relation Age of Onset    Stroke Mother     Depression Mother         Anxiety depression and heart    Heart disease Mother         Heart  problems and stroke    Depression Sister         Anxiety depression    Heart disease Maternal Grandmother         Heart issues (murmur)    Early death Sister         Her two daughters (seperate pregnancy stillborn twice)    Breast cancer Neg Hx     Ovarian cancer Neg Hx        Review of Systems   Constitutional:  Negative for chills, fatigue, fever and unexpected weight change.   HENT:  Negative for ear pain and sore throat.    Eyes:  Negative for redness and visual disturbance.   Respiratory:  Positive for cough. Negative for shortness of breath.    Cardiovascular:  Negative for chest pain and palpitations.   Gastrointestinal:  Negative for nausea and vomiting.   Genitourinary:  Negative for difficulty urinating and hematuria.   Musculoskeletal:  Negative for arthralgias and myalgias.   Skin:  Negative for rash and wound.   Neurological:  Negative for weakness and headaches.   Psychiatric/Behavioral:  Positive for sleep disturbance. The patient is not nervous/anxious.     Objective:    Nursing note and vitals reviewed.  Vitals:    09/12/22 1355   BP: 118/60   Pulse: 92   Resp: 18   Temp: 97.5 °F (36.4 °C)     Body mass index is 24.54 kg/m².   Physical Exam  Vitals and nursing note reviewed.   Constitutional:       General: She is not in acute distress.     Appearance: She is well-developed. She is not ill-appearing, toxic-appearing or diaphoretic.   HENT:      Head: Normocephalic and atraumatic.      Right Ear: Hearing, tympanic membrane, ear canal and external ear normal. There is no impacted cerumen.      Left Ear: Hearing, tympanic membrane, ear canal and external ear normal. There is no impacted cerumen.      Nose: Nose normal. No congestion or rhinorrhea.      Mouth/Throat:      Pharynx: Posterior oropharyngeal erythema present.   Eyes:      General: Lids are normal.         Right eye: No discharge.         Left eye: No discharge.      Extraocular Movements: Extraocular movements intact.       Conjunctiva/sclera: Conjunctivae normal.      Pupils: Pupils are equal, round, and reactive to light.   Neck:      Vascular: No carotid bruit.   Cardiovascular:      Rate and Rhythm: Normal rate.      Pulses: Normal pulses.   Pulmonary:      Effort: Pulmonary effort is normal. No respiratory distress.      Breath sounds: Normal breath sounds.   Abdominal:      General: Bowel sounds are normal.      Palpations: Abdomen is soft.   Musculoskeletal:         General: Normal range of motion.      Cervical back: Normal range of motion and neck supple.   Lymphadenopathy:      Cervical: No cervical adenopathy.   Skin:     General: Skin is warm and dry.      Capillary Refill: Capillary refill takes less than 2 seconds.      Coloration: Skin is not pale.   Neurological:      General: No focal deficit present.      Mental Status: She is alert and oriented to person, place, and time. Mental status is at baseline. She is not disoriented.      Cranial Nerves: No cranial nerve deficit.      Motor: No weakness.      Gait: Gait normal.   Psychiatric:         Attention and Perception: She is attentive.         Mood and Affect: Mood normal. Mood is not anxious or depressed.         Speech: Speech is not rapid and pressured or slurred.         Behavior: Behavior normal. Behavior is not agitated, aggressive or hyperactive. Behavior is cooperative.         Thought Content: Thought content normal. Thought content is not paranoid or delusional. Thought content does not include homicidal or suicidal ideation. Thought content does not include homicidal or suicidal plan.         Cognition and Memory: Memory is not impaired.         Judgment: Judgment normal.     Lab Visit on 05/31/2022   Component Date Value Ref Range Status    Alcohol, Urine 05/31/2022 <10  <10 mg/dL Final    Benzodiazepines 05/31/2022 Negative  Negative Final    Methadone metabolites 05/31/2022 Negative  Negative Final    Cocaine (Metab.) 05/31/2022 Negative  Negative  Final    Opiate Scrn, Ur 05/31/2022 Negative  Negative Final    Barbiturate Screen, Ur 05/31/2022 Negative  Negative Final    Amphetamine Screen, Ur 05/31/2022 Negative  Negative Final    THC 05/31/2022 Negative  Negative Final    Phencyclidine 05/31/2022 Negative  Negative Final    Creatinine, Urine 05/31/2022 80.0  15.0 - 325.0 mg/dL Final    Toxicology Information 05/31/2022 SEE COMMENT   Final    Comment: This screen includes the following classes of drugs at the listed   cut-off:    Benzodiazepines 200 ng/ml  Methadone 300 ng/ml  Cocaine metabolite 300 ng/ml  Opiates 300 ng/ml  Barbiturates 200 ng/ml  Amphetamines 1000 ng/ml  Marijuana metabs (THC) 50 ng/ml  Phencyclidine (PCP) 25 ng/ml    This is a screening test. If results do not correlate with clinical   presentation, then a confirmatory send out test is advised.     This report is intended for use in clinical monitoring and management   of   patients. It is not intended for use in employment related drug   testing.        Assessment / Plan:      1.  ANNUAL WELLNESS EXAM -patient here for annual wellness exam.  Labs ordered.  Health maintenance was reviewed and ordered.  Medications were reviewed and reconciled.   Anticipatory guidance: Don't smoke.  Healthy diet and regular exercise recommended. Vaccine recommendations discussed.  See orders.  Reviewed Anticipatory guidance, risk factor reduction interventions and counseling, Complete history , physical was completed today.  Complete and thorough medication reconciliation was performed.  Discussed risks and benefits of medications.  Advised patient on orders and health maintenance.  We discussed old records and old labs if available.  Will request any records not available through epic.  Continue current medications listed on your summary sheet.    Insomnia:  Hold deferred to Psychiatry since she is being treated with multiple medications already.Discussed insomnia condition course.  Advised of  first-line medications for this condition.  Also discussed sleep hygiene.  Information was given below.  Good sleep habits (sometimes referred to as sleep hygiene) can help you get a good nights sleep.    Some habits that can improve your sleep health:  -Be consistent. Go to bed at the same time each night and get up at the same time each morning, including on the weekends  -Make sure your bedroom is quiet, dark, relaxing, and at a comfortable temperature  -Remove electronic devices, such as TVs, computers, and smart phones, from the bedroom  -Avoid large meals, caffeine, and alcohol before bedtime  -Get some exercise. Being physically active during the day can help you fall asleep more easily at night.    Seizures:  Patient should follow-up with Neurology after getting EEG as ordered.  I will refill her Keppra temporarily until she can get back in with them.  Seizure precautions.  Check Keppra level.  Mood:  Continue medications by Psychiatry.Please be advised of condition course.  SNRI/SSRI is first-line treatment for this condition.  Please be advised of the risk of discontinuing this medication without tapering/contacting MD.  Patient has been advised of side effects, and all questions were answered.  Patient voiced understanding.  Patient will follow up routinely and notify us if having any side effects or worsening or persistent symptoms.  ER precautions were given. Antidepressant/Antianxiety Medication Initiation:  Patient informed of risks, benefits, and potential side effects of medication and accepts informed consent.  Common side effects include nausea, fatigue, headache, insomnia, etc see medication insert for complete side effect profile.  Most importantly be advised of the possibility of new or worsening suicidal thoughts/depression/anxiety etcetera.  Please be advised to stop the medication immediately and seek urgent treatment if this occurs.  Therefore please do not to abruptly discontinue  medication without physician guidance except in cases of sudden onset or worsening of SI.     Migraines:  Patient requesting refill on Fioricet.  She has not followed up with Neurology since last year.  Patient advised that I will per refill a one time short-term prescription for this medication.  She must get back in with Neurology for further evaluation and treatment of migraines.    GERD RECOMMENDATIONS  Please be advised of condition course.  - Take PPI in the morning 30-60 minutes before breakfast  - I recommend ongoing Education for lifestyle modifications to help control/reduce reflux/abdominal pain including: avoid large meals, avoid eating within 2-3 hours of bedtime (avoid late night eating & lying down soon after eating), elevate head of bed if nocturnal symptoms are present, smoking cessation (if current smoker), & weight loss (if overweight).   - please be advised to avoid known foods which trigger reflux symptoms & to minimize/avoid high-fat foods, chocolate, caffeine, citrus, alcohol, & tomato products.  - Advised to avoid/limit use of NSAID's, since they can cause GI upset, bleeding, and/or ulcers. If needed, take with food.     Assistance with smoking cessation was offered, including:  [x]  Medications  [x]  Counseling  [x]  Printed Information on Smoking Cessation  [x]  Referral to a Smoking Cessation Program    Patient was counseled regarding smoking for 3-10 minutes.    Upper respiratory infection: Start five day course of short term low-dose prednisone.  Discussed condition course and signs and symptoms to expect.  Patient advised take anti-inflammatories and or Tylenol for pain or fever.  ER precautions.  Call MD or follow-up to clinic if not improving or worsening symptoms.    Postpone pneumonia vaccine until patient is feeling better.      All questions were answered. Patient had no further concerns. Advised of Wellness plan. Follow up in 1 year for ANNUAL WELLNESS EXAM    Orders Placed This  Encounter   Procedures    (In Office Administered) Pneumococcal Conjugate Vaccine (20 Valent) (IM)    Levetiracetam level     Standing Status:   Future     Standing Expiration Date:   11/11/2023    Ambulatory referral/consult to Neurology     Standing Status:   Future     Standing Expiration Date:   10/12/2023     Referral Priority:   Routine     Referral Type:   Consultation     Referral Reason:   Specialty Services Required     Requested Specialty:   Neurology     Number of Visits Requested:   1    Ambulatory referral/consult to Smoking Cessation Program     Standing Status:   Future     Standing Expiration Date:   10/12/2023     Referral Priority:   Routine     Referral Type:   Consultation     Referral Reason:   Specialty Services Required     Requested Specialty:   CTTS     Number of Visits Requested:   1     Medications Ordered This Encounter   Medications    butalbital-acetaminophen-caffeine -40 mg (FIORICET, ESGIC) -40 mg per tablet     Sig: Take 1 tablet by mouth every 12 (twelve) hours as needed for Headaches (Migraines).     Dispense:  30 tablet     Refill:  0    levETIRAcetam (KEPPRA) 750 MG Tab     Sig: Take 2 tablets (1,500 mg total) by mouth 2 (two) times daily.     Dispense:  240 tablet     Refill:  0    pantoprazole (PROTONIX) 40 MG tablet     Sig: Take 1 tablet (40 mg total) by mouth once daily.     Dispense:  90 tablet     Refill:  4    predniSONE (DELTASONE) 20 MG tablet     Sig: Take 1 tablet (20 mg total) by mouth once daily. for 5 days     Dispense:  5 tablet     Refill:  0    VITAFOL GUMMIES 3.33 mg iron- 0.33 mg Chew     Sig: Take 2 tablets by mouth once daily at 6am.     Dispense:  180 tablet     Refill:  4      Future Appointments       Date Specialty Appt Notes    9/15/2022 Lab H            Paul Larson MD

## 2022-09-12 NOTE — PATIENT INSTRUCTIONS
Follow up in 1 year (on 9/12/2023), or if symptoms worsen or fail to improve, for Annual Wellness Exam.     Dear patient,   As a result of recent federal legislation (The Federal Cures Act), you may receive lab or pathology results from your visit in your [facility] account before your physician is able to contact you. Your physician or their representative will relay the results to you with their recommendations at their soonest availability.     If no improvement in symptoms or symptoms worsen, please be advised to call MD, follow-up at clinic and/or go to ER if becomes severe.    Paul Larson M.D.        We Offer TELEHEALTH & Same Day Appointments!   Book your Telehealth appointment with me through my nurse or   Clinic appointments on [employer]!    [address] [place] [address]    Office: [phone]   FAX: [phone]    Check out my Facebook Page and Follow Me at: [email or web address]    Check out my website at [employer] by clicking on: [email or web address]    To Schedule appointments online, go to [email or web address]: [email or web address]

## 2022-09-19 ENCOUNTER — TELEPHONE (OUTPATIENT)
Dept: NEUROLOGY | Facility: CLINIC | Age: 32
End: 2022-09-19
Payer: MEDICAID

## 2022-09-21 ENCOUNTER — PATIENT MESSAGE (OUTPATIENT)
Dept: NEUROLOGY | Facility: CLINIC | Age: 32
End: 2022-09-21
Payer: MEDICAID

## 2022-09-26 ENCOUNTER — PATIENT MESSAGE (OUTPATIENT)
Dept: FAMILY MEDICINE | Facility: CLINIC | Age: 32
End: 2022-09-26
Payer: MEDICAID

## 2022-09-26 DIAGNOSIS — R51.9 CHRONIC NONINTRACTABLE HEADACHE, UNSPECIFIED HEADACHE TYPE: Primary | ICD-10-CM

## 2022-09-26 DIAGNOSIS — G89.29 CHRONIC NONINTRACTABLE HEADACHE, UNSPECIFIED HEADACHE TYPE: Primary | ICD-10-CM

## 2022-09-26 RX ORDER — IBUPROFEN 800 MG/1
800 TABLET ORAL EVERY 8 HOURS PRN
Qty: 30 TABLET | Refills: 0 | Status: SHIPPED | OUTPATIENT
Start: 2022-09-26 | End: 2022-10-06

## 2022-09-26 NOTE — TELEPHONE ENCOUNTER
I have signed for the following orders AND/OR meds.  Please call the patient and ask the patient to schedule the testing AND/OR inform about any medications that were sent.      No orders of the defined types were placed in this encounter.      Medications Ordered This Encounter   Medications    ibuprofen (ADVIL,MOTRIN) 800 MG tablet     Sig: Take 1 tablet (800 mg total) by mouth every 8 (eight) hours as needed for Pain.     Dispense:  30 tablet     Refill:  0

## 2022-09-26 NOTE — TELEPHONE ENCOUNTER
I have signed for the following orders AND/OR meds.  Please call the patient and ask the patient to schedule the testing AND/OR inform about any medications that were sent.      Orders Placed This Encounter   Procedures    Ambulatory referral/consult to Neurology     Standing Status:   Future     Standing Expiration Date:   10/26/2023     Referral Priority:   Routine     Referral Type:   Consultation     Referral Reason:   Specialty Services Required     Requested Specialty:   Neurology     Number of Visits Requested:   1

## 2022-10-03 ENCOUNTER — PATIENT MESSAGE (OUTPATIENT)
Dept: FAMILY MEDICINE | Facility: CLINIC | Age: 32
End: 2022-10-03
Payer: MEDICAID

## 2022-10-20 ENCOUNTER — TELEPHONE (OUTPATIENT)
Dept: NEUROLOGY | Facility: CLINIC | Age: 32
End: 2022-10-20
Payer: MEDICAID

## 2022-10-20 ENCOUNTER — PATIENT MESSAGE (OUTPATIENT)
Dept: FAMILY MEDICINE | Facility: CLINIC | Age: 32
End: 2022-10-20
Payer: MEDICAID

## 2022-10-20 DIAGNOSIS — R56.9 SEIZURES: ICD-10-CM

## 2022-10-20 DIAGNOSIS — Z79.899 ENCOUNTER FOR LONG-TERM (CURRENT) USE OF MEDICATIONS: ICD-10-CM

## 2022-10-20 RX ORDER — BUTALBITAL, ACETAMINOPHEN AND CAFFEINE 50; 325; 40 MG/1; MG/1; MG/1
1 TABLET ORAL EVERY 12 HOURS PRN
Qty: 30 TABLET | Refills: 0 | Status: SHIPPED | OUTPATIENT
Start: 2022-10-20 | End: 2022-11-03 | Stop reason: SDUPTHER

## 2022-10-20 RX ORDER — LEVETIRACETAM 750 MG/1
1500 TABLET ORAL 2 TIMES DAILY
Qty: 240 TABLET | Refills: 0 | Status: SHIPPED | OUTPATIENT
Start: 2022-10-20 | End: 2022-11-03 | Stop reason: SDUPTHER

## 2022-10-20 NOTE — TELEPHONE ENCOUNTER
Spoke with patient and informed her that we are not accepting any new or outside Medicaid at this time. I gave her the outside medicaid referral telephone number to try to get her scheduled. -BR

## 2022-10-20 NOTE — TELEPHONE ENCOUNTER
Attempted to reach patient regarding her virtual appt. Being scheduled incorrectly. VM was left.-BR

## 2022-10-20 NOTE — TELEPHONE ENCOUNTER
I received your message which was reviewed along with the the medication list and allergies that we have below.  Please review it for accuracy to make sure that we have the most recent records on your history.     Based on this, the following orders were placed AND/OR medicines were sent in.     No orders of the defined types were placed in this encounter.      Medications written and sent at this time include:  Medications Ordered This Encounter   Medications    butalbital-acetaminophen-caffeine -40 mg (FIORICET, ESGIC) -40 mg per tablet     Sig: Take 1 tablet by mouth every 12 (twelve) hours as needed for Headaches (Migraines).     Dispense:  30 tablet     Refill:  0    levETIRAcetam (KEPPRA) 750 MG Tab     Sig: Take 2 tablets (1,500 mg total) by mouth 2 (two) times daily.     Dispense:  240 tablet     Refill:  0       Your pharmacy(ies) of choice at this time on record include the list below and any medications would have been sent to the one at the top.    [facility] [address]  [address] [address]  Phone: [phone] Fax: [phone]      Thank you for choosing us as your healthcare provider!  Dr. Paul Larson    ALLERGY LIST  Review of patient's allergies indicates:   Allergen Reactions    Darvocet a500 [propoxyphene n-acetaminophen]     Phenytoin sodium extended Other (See Comments)       MEDICATION LIST  Current Outpatient Medications on File Prior to Visit   Medication Sig Dispense Refill    busPIRone (BUSPAR) 10 MG tablet Take 10 mg by mouth 2 (two) times daily.      gabapentin (NEURONTIN) 800 MG tablet Take 1 tablet (800 mg total) by mouth 3 (three) times daily. 90 tablet 0    lamoTRIgine (LAMICTAL) 25 MG tablet Take 50 mg by mouth once daily.      pantoprazole (PROTONIX) 40 MG tablet Take 1 tablet (40 mg total) by mouth once daily. 90 tablet 4    QUEtiapine (SEROQUEL) 200 MG Tab Take 200 mg by mouth every evening.       sertraline (ZOLOFT) 50 MG tablet Take 50 mg by mouth once daily.      VITAFOL GUMMIES 3.33 mg iron- 0.33 mg Chew Take 2 tablets by mouth once daily at 6am. 180 tablet 4    [DISCONTINUED] butalbital-acetaminophen-caffeine -40 mg (FIORICET, ESGIC) -40 mg per tablet Take 1 tablet by mouth every 12 (twelve) hours as needed for Headaches (Migraines). 30 tablet 0    [DISCONTINUED] levETIRAcetam (KEPPRA) 750 MG Tab Take 2 tablets (1,500 mg total) by mouth 2 (two) times daily. 240 tablet 0    [DISCONTINUED] levomefolate calcium (ELFOLATE) 15 mg Tab       [DISCONTINUED] ranitidine (ZANTAC) 150 MG tablet Take 1 tablet (150 mg total) by mouth 2 (two) times daily as needed. 60 tablet 11     No current facility-administered medications on file prior to visit.       HEALTH MAINTENANCE THAT IS OVERDUE OR NEEDS TO BE UPDATED ON OUR CHART IS LISTED BELOW.  IF YOU HAVE HAD IT DONE ELSEWHERE, PLEASE SEND US DATES AND RECORDS IF YOU HAVE THEM TO MAKE YOUR CHART ACCURATE.  IF YOU HAVE NOT HAD THESE DONE AND ARE READY FOR US TO SCHEDULE THEM, PLEASE SEND US A MESSAGE.  Health Maintenance Due   Topic Date Due    Pneumococcal Vaccines (Age 0-64) (1 - PCV) Never done    COVID-19 Vaccine (2 - Pfizer series) 01/03/2022    Influenza Vaccine (1) 09/01/2022       DISCLAIMER: This note was compiled by using a speech recognition dictation system and therefore please be aware that typographical / speech recognition errors can and do occur.  Please contact me if you see any errors specifically.    Paul Larson MD  We Offer Telehealth & Same Day Appointments!   Book your Telehealth appointment with me through my nurse or   Clinic appointments on [employer]!  [phone]     Check out my Facebook Page and Follow Me at: CLICK HERE    Check out my website at [employer] by clicking on: CLICK HERE    To Schedule appointments online, go to [employer]: CLICK HERE     Location: [email or web address]    [id number]  [facility] [address]    FAX: [phone]

## 2022-10-21 NOTE — TELEPHONE ENCOUNTER
As requested changed appt to Dr Werner for Thrusday in urgent slot   Siliq Counseling:  I discussed with the patient the risks of Siliq including but not limited to new or worsening depression, suicidal thoughts and behavior, immunosuppression, malignancy, posterior leukoencephalopathy syndrome, and serious infections.  The patient understands that monitoring is required including a PPD at baseline and must alert us or the primary physician if symptoms of infection or other concerning signs are noted. There is also a special program designed to monitor depression which is required with Siliq.

## 2022-11-03 ENCOUNTER — OFFICE VISIT (OUTPATIENT)
Dept: FAMILY MEDICINE | Facility: CLINIC | Age: 32
End: 2022-11-03
Payer: MEDICAID

## 2022-11-03 ENCOUNTER — PATIENT MESSAGE (OUTPATIENT)
Dept: FAMILY MEDICINE | Facility: CLINIC | Age: 32
End: 2022-11-03

## 2022-11-03 DIAGNOSIS — J40 BRONCHITIS: ICD-10-CM

## 2022-11-03 DIAGNOSIS — Z79.899 ENCOUNTER FOR LONG-TERM (CURRENT) USE OF MEDICATIONS: ICD-10-CM

## 2022-11-03 DIAGNOSIS — R56.9 SEIZURES: ICD-10-CM

## 2022-11-03 PROCEDURE — 99213 PR OFFICE/OUTPT VISIT, EST, LEVL III, 20-29 MIN: ICD-10-PCS | Mod: 95,,, | Performed by: FAMILY MEDICINE

## 2022-11-03 PROCEDURE — 99213 OFFICE O/P EST LOW 20 MIN: CPT | Mod: 95,,, | Performed by: FAMILY MEDICINE

## 2022-11-03 RX ORDER — AZITHROMYCIN 250 MG/1
TABLET, FILM COATED ORAL
Qty: 6 TABLET | Refills: 0 | Status: SHIPPED | OUTPATIENT
Start: 2022-11-03 | End: 2022-11-30

## 2022-11-03 RX ORDER — BUTALBITAL, ACETAMINOPHEN AND CAFFEINE 50; 325; 40 MG/1; MG/1; MG/1
1 TABLET ORAL EVERY 12 HOURS PRN
Qty: 30 TABLET | Refills: 0 | Status: SHIPPED | OUTPATIENT
Start: 2022-11-03 | End: 2022-11-30 | Stop reason: SDUPTHER

## 2022-11-03 NOTE — PROGRESS NOTES
The patient location is: Home  The chief complaint leading to consultation is:Upper rsp congestion   Visit type: Virtual visit with synchronous audio and video  Total time spent with patient: 15 minutes  Each patient to whom he or she provides medical services by telemedicine is:  (1) informed of the relationship between the physician and patient and the respective role of any other health care provider with respect to management of the patient; and (2) notified that he or she may decline to receive medical services by telemedicine and may withdraw from such care at any time.    Notes:   Sara Sanford presents with moderate upper respiratory congestion,rhinnorhea,moderate cough past 2-3 days. No dyspnea Denies nausea,vomiting,diarrhea or significant fever.    Past Medical History:   Diagnosis Date    Abnormal Pap smear     ALLERGIC RHINITIS     Anemia     Anxiety     Bipolar 1 disorder     Chronic back pain     Drug-seeking behavior 4/16/2012    Epilepsy     GERD (gastroesophageal reflux disease)     GERD (gastroesophageal reflux disease)     Hepatitis C 2018    Medications given     Opioid dependence 4/16/2012    Pre-eclampsia     Seizures     last episode 2020, EEG-normal;     Urinary tract infection      Past Surgical History:   Procedure Laterality Date    CHOLECYSTECTOMY      LAPAROSCOPIC LIGATION OF FALLOPIAN TUBE Bilateral 5/23/2022    Procedure: LIGATION, FALLOPIAN TUBE, LAPAROSCOPIC;  Surgeon: Kristian Landaverde MD;  Location: [facility];  Service: OB/GYN;  Laterality: Bilateral;    mirena removal  02/2018    PELVIC LAPAROSCOPY      iud retrieval     VAGINAL DELIVERY      times 4    vestibulectomy      WISDOM TOOTH EXTRACTION       Review of patient's allergies indicates:   Allergen Reactions    Darvocet a500 [propoxyphene n-acetaminophen]     Phenytoin sodium extended Other (See Comments)     Current Outpatient Medications on File Prior to Visit   Medication Sig Dispense Refill    busPIRone (BUSPAR) 10 MG  tablet Take 10 mg by mouth 2 (two) times daily.      butalbital-acetaminophen-caffeine -40 mg (FIORICET, ESGIC) -40 mg per tablet Take 1 tablet by mouth every 12 (twelve) hours as needed for Headaches (Migraines). 30 tablet 0    gabapentin (NEURONTIN) 800 MG tablet Take 1 tablet (800 mg total) by mouth 3 (three) times daily. 90 tablet 0    lamoTRIgine (LAMICTAL) 25 MG tablet Take 50 mg by mouth once daily.      levETIRAcetam (KEPPRA) 750 MG Tab Take 2 tablets (1,500 mg total) by mouth 2 (two) times daily. 240 tablet 0    pantoprazole (PROTONIX) 40 MG tablet Take 1 tablet (40 mg total) by mouth once daily. 90 tablet 4    QUEtiapine (SEROQUEL) 200 MG Tab Take 200 mg by mouth every evening.      sertraline (ZOLOFT) 50 MG tablet Take 50 mg by mouth once daily.      VITAFOL GUMMIES 3.33 mg iron- 0.33 mg Chew Take 2 tablets by mouth once daily at 6am. 180 tablet 4    [DISCONTINUED] levETIRAcetam (KEPPRA) 750 MG Tab Take 2 tablets (1,500 mg total) by mouth 2 (two) times daily. 240 tablet 0    [DISCONTINUED] levomefolate calcium (ELFOLATE) 15 mg Tab       [DISCONTINUED] ranitidine (ZANTAC) 150 MG tablet Take 1 tablet (150 mg total) by mouth 2 (two) times daily as needed. 60 tablet 11     No current facility-administered medications on file prior to visit.     Social History     Socioeconomic History    Marital status: Single   Tobacco Use    Smoking status: Every Day     Packs/day: 0.50     Years: 10.00     Pack years: 5.00     Types: Cigarettes, Vaping with nicotine     Passive exposure: Never    Smokeless tobacco: Never    Tobacco comments:     Trying to quit   Substance and Sexual Activity    Alcohol use: Not Currently     Comment: occasional    Drug use: Not Currently     Types: Hydrocodone     Comment: past histoy last 4/7/2022    Sexual activity: Yes     Partners: Male     Birth control/protection: None     Family History   Problem Relation Age of Onset    Stroke Mother     Depression Mother          Anxiety depression and heart    Heart disease Mother         Heart problems and stroke    Depression Sister         Anxiety depression    Heart disease Maternal Grandmother         Heart issues (murmur)    Early death Sister         Her two daughters (seperate pregnancy stillborn twice)    Breast cancer Neg Hx     Ovarian cancer Neg Hx          ROS:  SKIN: No rashes, itching or changes in color or texture of skin.  EYES: Visual acuity fine. No photophobia, ocular pain or diplopia.EARS: Denies ear pain, discharge or vertigo.NOSE: No loss of smell, no epistaxis some postnasal drip.MOUTH & THROAT: No hoarseness or change in voice. No excessive gum bleeding.CHEST: Denies MEDINA, cyanosis, wheezing  CARDIOVASCULAR: Denies chest pain, PND, orthopnea or reduced exercise tolerance.  ABDOMEN:  No weight loss.No abdominal pain, no hematemesis or blood in stool.  URINARY: No flank pain, dysuria or hematuria.  PERIPHERAL VASCULAR: No claudication or cyanosis.  MUSCULOSKELETAL: Negative   NEUROLOGIC: No history of seizures, paralysis, alteration of gait or coordination.    PE: Heent:Normocephalic with no recent cranial trauma,PERRLA,EOMI,conjunctiva clear,Nasal mucosa slightly red and edematous.Posterior pharynx slightly red but without exudate.  Chest:No tachypnea. No wheezing, rhonchi on forced expiration  Abdomen:Soft, non tender to patient palpation  Diagnoses and all orders for this visit:    Bronchitis  -     X-Ray Chest PA And Lateral; Future    Encounter for long-term (current) use of medications  -     butalbital-acetaminophen-caffeine -40 mg (FIORICET, ESGIC) -40 mg per tablet; Take 1 tablet by mouth every 12 (twelve) hours as needed for Headaches (Migraines).    Seizures    Other orders  -     azithromycin (Z-PARTH) 250 MG tablet; Take 2 tabs today then one tab daily for 4 days     Answers submitted by the patient for this visit:  Cough Questionnaire (Submitted on 11/3/2022)  Chief Complaint: Cough  Chronicity:  recurrent  Onset: 1 to 4 weeks ago  Progression since onset: unchanged  Frequency: hourly  Cough characteristics: productive of purulent sputum  chest pain: Yes  chills: Yes  ear congestion: No  ear pain: No  fever: No  headaches: Yes  heartburn: Yes  hemoptysis: No  myalgias: Yes  nasal congestion: No  postnasal drip: No  rash: Yes  rhinorrhea: No  shortness of breath: Yes  sore throat: No  sweats: Yes  weight loss: No  wheezing: Yes  Aggravated by: animals, cold air, dust, exercise, fumes, lying down, pollens, stress  Risk factors for lung disease: animal exposure, smoking/tobacco exposure  asthma: No  bronchiectasis: No  bronchitis: Yes  COPD: No  emphysema: No  environmental allergies: Yes  pneumonia: Yes  Treatments tried: OTC cough suppressant, a beta-agonist inhaler, rest  Improvement on treatment: mild

## 2022-11-04 ENCOUNTER — HOSPITAL ENCOUNTER (OUTPATIENT)
Dept: RADIOLOGY | Facility: HOSPITAL | Age: 32
Discharge: HOME | End: 2022-11-04
Attending: FAMILY MEDICINE
Payer: MEDICAID

## 2022-11-04 DIAGNOSIS — J40 BRONCHITIS: ICD-10-CM

## 2022-11-04 PROCEDURE — 71046 X-RAY EXAM CHEST 2 VIEWS: CPT | Mod: TC,PO

## 2022-11-04 PROCEDURE — 71046 X-RAY EXAM CHEST 2 VIEWS: CPT | Mod: 26,,, | Performed by: RADIOLOGY

## 2022-11-04 PROCEDURE — 71046 XR CHEST PA AND LATERAL: ICD-10-PCS | Mod: 26,,, | Performed by: RADIOLOGY

## 2022-11-15 ENCOUNTER — TELEPHONE (OUTPATIENT)
Dept: FAMILY MEDICINE | Facility: CLINIC | Age: 32
End: 2022-11-15
Payer: MEDICAID

## 2022-11-18 ENCOUNTER — TELEPHONE (OUTPATIENT)
Dept: NEUROLOGY | Facility: CLINIC | Age: 32
End: 2022-11-18
Payer: MEDICAID

## 2022-11-29 ENCOUNTER — PATIENT MESSAGE (OUTPATIENT)
Dept: FAMILY MEDICINE | Facility: CLINIC | Age: 32
End: 2022-11-29
Payer: MEDICAID

## 2022-11-30 ENCOUNTER — OFFICE VISIT (OUTPATIENT)
Dept: FAMILY MEDICINE | Facility: CLINIC | Age: 32
End: 2022-11-30
Payer: MEDICAID

## 2022-11-30 ENCOUNTER — PATIENT MESSAGE (OUTPATIENT)
Dept: FAMILY MEDICINE | Facility: CLINIC | Age: 32
End: 2022-11-30
Payer: MEDICAID

## 2022-11-30 DIAGNOSIS — J20.9 BRONCHITIS WITH BRONCHOSPASM: ICD-10-CM

## 2022-11-30 DIAGNOSIS — G89.29 CHRONIC NONINTRACTABLE HEADACHE, UNSPECIFIED HEADACHE TYPE: ICD-10-CM

## 2022-11-30 DIAGNOSIS — R51.9 CHRONIC NONINTRACTABLE HEADACHE, UNSPECIFIED HEADACHE TYPE: ICD-10-CM

## 2022-11-30 DIAGNOSIS — Z79.899 ENCOUNTER FOR LONG-TERM (CURRENT) USE OF MEDICATIONS: ICD-10-CM

## 2022-11-30 PROCEDURE — 1159F MED LIST DOCD IN RCRD: CPT | Mod: CPTII,95,, | Performed by: NURSE PRACTITIONER

## 2022-11-30 PROCEDURE — 1160F PR REVIEW ALL MEDS BY PRESCRIBER/CLIN PHARMACIST DOCUMENTED: ICD-10-PCS | Mod: CPTII,95,, | Performed by: NURSE PRACTITIONER

## 2022-11-30 PROCEDURE — 1159F PR MEDICATION LIST DOCUMENTED IN MEDICAL RECORD: ICD-10-PCS | Mod: CPTII,95,, | Performed by: NURSE PRACTITIONER

## 2022-11-30 PROCEDURE — 1160F RVW MEDS BY RX/DR IN RCRD: CPT | Mod: CPTII,95,, | Performed by: NURSE PRACTITIONER

## 2022-11-30 PROCEDURE — 99214 OFFICE O/P EST MOD 30 MIN: CPT | Mod: 95,,, | Performed by: NURSE PRACTITIONER

## 2022-11-30 PROCEDURE — 99214 PR OFFICE/OUTPT VISIT, EST, LEVL IV, 30-39 MIN: ICD-10-PCS | Mod: 95,,, | Performed by: NURSE PRACTITIONER

## 2022-11-30 RX ORDER — BUTALBITAL, ACETAMINOPHEN AND CAFFEINE 50; 325; 40 MG/1; MG/1; MG/1
1 TABLET ORAL EVERY 12 HOURS PRN
Qty: 30 TABLET | Refills: 0 | Status: SHIPPED | OUTPATIENT
Start: 2022-11-30 | End: 2022-11-30 | Stop reason: SDUPTHER

## 2022-11-30 RX ORDER — ALBUTEROL SULFATE 90 UG/1
2 AEROSOL, METERED RESPIRATORY (INHALATION) EVERY 6 HOURS PRN
Qty: 18 G | Refills: 1 | Status: SHIPPED | OUTPATIENT
Start: 2022-11-30 | End: 2023-01-12 | Stop reason: SDUPTHER

## 2022-11-30 RX ORDER — BUTALBITAL, ACETAMINOPHEN AND CAFFEINE 50; 325; 40 MG/1; MG/1; MG/1
1 TABLET ORAL EVERY 12 HOURS PRN
Qty: 30 TABLET | Refills: 1 | Status: SHIPPED | OUTPATIENT
Start: 2022-11-30 | End: 2023-01-27 | Stop reason: SDUPTHER

## 2022-11-30 RX ORDER — ONDANSETRON 8 MG/1
8 TABLET, ORALLY DISINTEGRATING ORAL EVERY 6 HOURS PRN
Qty: 30 TABLET | Refills: 1 | Status: SHIPPED | OUTPATIENT
Start: 2022-11-30 | End: 2023-01-12 | Stop reason: SDUPTHER

## 2022-11-30 RX ORDER — AMOXICILLIN AND CLAVULANATE POTASSIUM 875; 125 MG/1; MG/1
1 TABLET, FILM COATED ORAL EVERY 12 HOURS
Qty: 20 TABLET | Refills: 0 | Status: SHIPPED | OUTPATIENT
Start: 2022-11-30 | End: 2022-12-10

## 2022-11-30 NOTE — PROGRESS NOTES
Subjective:       Patient ID: Sara Sanford is a 31 y.o. female.    Primary Care Telemedicine Note    The patient location is:  Patient Home   The chief complaint leading to consultation is: cough  Total time spent with patient: 15 mins    Visit type: Virtual visit with synchronous audio only and video  Each patient to whom he or she provides medical services by telemedicine is:  (1) informed of the relationship between the physician and patient and the respective role of any other health care provider with respect to management of the patient; and (2) notified that he or she may decline to receive medical services by telemedicine and may withdraw from such care at any time.      Chief Complaint: No chief complaint on file.    Cough  This is a chronic problem. The current episode started more than 1 month ago. The problem has been waxing and waning. The problem occurs every few hours. The cough is Productive of purulent sputum. Associated symptoms include chest pain, headaches, heartburn, myalgias, shortness of breath and sweats. Pertinent negatives include no chills, ear congestion, ear pain, fever, hemoptysis, nasal congestion, postnasal drip, rash, rhinorrhea, sore throat, weight loss or wheezing. The symptoms are aggravated by animals, dust, fumes, lying down, pollens and stress. Risk factors for lung disease include animal exposure and smoking/tobacco exposure. She has tried a beta-agonist inhaler, body position changes, cool air, oral steroids, prescription cough suppressant and rest for the symptoms. The treatment provided mild relief. Her past medical history is significant for bronchitis, environmental allergies and pneumonia. There is no history of asthma, bronchiectasis, COPD or emphysema.   Medication Refill  This is a chronic (Fioricet) problem. The current episode started more than 1 month ago. The problem occurs daily. The problem has been unchanged. Associated symptoms include chest pain, coughing,  headaches and myalgias. Pertinent negatives include no abdominal pain, arthralgias, chills, fatigue, fever, rash, sore throat or vomiting. She has tried acetaminophen and NSAIDs (Fioricet) for the symptoms. The treatment provided moderate relief.     Review of Systems   Constitutional:  Negative for chills, fatigue, fever, unexpected weight change and weight loss.   HENT:  Negative for ear pain, postnasal drip, rhinorrhea and sore throat.    Eyes:  Negative for pain and visual disturbance.   Respiratory:  Positive for cough and shortness of breath. Negative for hemoptysis and wheezing.    Cardiovascular:  Positive for chest pain. Negative for palpitations.   Gastrointestinal:  Positive for heartburn. Negative for abdominal pain, diarrhea and vomiting.   Musculoskeletal:  Positive for myalgias. Negative for arthralgias.   Skin:  Negative for color change and rash.   Allergic/Immunologic: Positive for environmental allergies.   Neurological:  Positive for headaches. Negative for dizziness.   Psychiatric/Behavioral:  Negative for dysphoric mood and sleep disturbance. The patient is not nervous/anxious.      There were no vitals filed for this visit.    Objective:     Current Outpatient Medications   Medication Sig Dispense Refill    albuterol (PROVENTIL HFA) 90 mcg/actuation inhaler Inhale 2 puffs into the lungs every 6 (six) hours as needed for Wheezing. Rescue 18 g 1    amoxicillin-clavulanate 875-125mg (AUGMENTIN) 875-125 mg per tablet Take 1 tablet by mouth every 12 (twelve) hours. for 10 days 20 tablet 0    busPIRone (BUSPAR) 10 MG tablet Take 10 mg by mouth 2 (two) times daily.      butalbital-acetaminophen-caffeine -40 mg (FIORICET, ESGIC) -40 mg per tablet Take 1 tablet by mouth every 12 (twelve) hours as needed for Headaches (Migraines). 30 tablet 1    gabapentin (NEURONTIN) 800 MG tablet Take 1 tablet (800 mg total) by mouth 3 (three) times daily. 90 tablet 0    levETIRAcetam (KEPPRA) 750 MG Tab  Take 2 tablets (1,500 mg total) by mouth 2 (two) times daily. 240 tablet 0    ondansetron (ZOFRAN-ODT) 8 MG TbDL Take 1 tablet (8 mg total) by mouth every 6 (six) hours as needed (nausea). 30 tablet 1    pantoprazole (PROTONIX) 40 MG tablet Take 1 tablet (40 mg total) by mouth once daily. 90 tablet 4    sertraline (ZOLOFT) 50 MG tablet Take 50 mg by mouth once daily.      VITAFOL GUMMIES 3.33 mg iron- 0.33 mg Chew Take 2 tablets by mouth once daily at 6am. 180 tablet 4     No current facility-administered medications for this visit.       Physical Exam  Constitutional:       Appearance: She is well-developed.   HENT:      Head: Normocephalic.   Pulmonary:      Effort: Pulmonary effort is normal. No respiratory distress.      Comments: + cough  Musculoskeletal:      Cervical back: Normal range of motion.   Neurological:      Mental Status: She is alert and oriented to person, place, and time.   Psychiatric:         Thought Content: Thought content normal.         Judgment: Judgment normal.       Assessment:       1. Bronchitis with bronchospasm    2. Encounter for long-term (current) use of medications    3. Chronic nonintractable headache, unspecified headache type          Plan:   Bronchitis with bronchospasm    Encounter for long-term (current) use of medications  -     Discontinue: butalbital-acetaminophen-caffeine -40 mg (FIORICET, ESGIC) -40 mg per tablet; Take 1 tablet by mouth every 12 (twelve) hours as needed for Headaches (Migraines).  Dispense: 30 tablet; Refill: 0  -     butalbital-acetaminophen-caffeine -40 mg (FIORICET, ESGIC) -40 mg per tablet; Take 1 tablet by mouth every 12 (twelve) hours as needed for Headaches (Migraines).  Dispense: 30 tablet; Refill: 1    Chronic nonintractable headache, unspecified headache type    Other orders  -     albuterol (PROVENTIL HFA) 90 mcg/actuation inhaler; Inhale 2 puffs into the lungs every 6 (six) hours as needed for Wheezing. Rescue   Dispense: 18 g; Refill: 1  -     amoxicillin-clavulanate 875-125mg (AUGMENTIN) 875-125 mg per tablet; Take 1 tablet by mouth every 12 (twelve) hours. for 10 days  Dispense: 20 tablet; Refill: 0  -     ondansetron (ZOFRAN-ODT) 8 MG TbDL; Take 1 tablet (8 mg total) by mouth every 6 (six) hours as needed (nausea).  Dispense: 30 tablet; Refill: 1      No more refills on Fioricet, she is going to follow up with neurology for headache treatment    No follow-ups on file.    There are no Patient Instructions on file for this visit.

## 2022-12-02 ENCOUNTER — PATIENT MESSAGE (OUTPATIENT)
Dept: FAMILY MEDICINE | Facility: CLINIC | Age: 32
End: 2022-12-02
Payer: MEDICAID

## 2022-12-06 ENCOUNTER — TELEPHONE (OUTPATIENT)
Dept: FAMILY MEDICINE | Facility: CLINIC | Age: 32
End: 2022-12-06

## 2022-12-06 DIAGNOSIS — R11.2 NAUSEA AND VOMITING, UNSPECIFIED VOMITING TYPE: Primary | ICD-10-CM

## 2022-12-12 ENCOUNTER — PATIENT MESSAGE (OUTPATIENT)
Dept: FAMILY MEDICINE | Facility: CLINIC | Age: 32
End: 2022-12-12
Payer: MEDICAID

## 2022-12-12 DIAGNOSIS — Z79.899 ENCOUNTER FOR LONG-TERM (CURRENT) USE OF MEDICATIONS: ICD-10-CM

## 2022-12-12 RX ORDER — BUTALBITAL, ACETAMINOPHEN AND CAFFEINE 50; 325; 40 MG/1; MG/1; MG/1
1 TABLET ORAL EVERY 12 HOURS PRN
Qty: 30 TABLET | Refills: 1 | Status: CANCELLED | OUTPATIENT
Start: 2022-12-12

## 2022-12-29 ENCOUNTER — PATIENT MESSAGE (OUTPATIENT)
Dept: FAMILY MEDICINE | Facility: CLINIC | Age: 32
End: 2022-12-29
Payer: MEDICAID

## 2023-01-10 ENCOUNTER — PATIENT MESSAGE (OUTPATIENT)
Dept: FAMILY MEDICINE | Facility: CLINIC | Age: 33
End: 2023-01-10
Payer: MEDICAID

## 2023-01-10 DIAGNOSIS — Z79.899 ENCOUNTER FOR LONG-TERM (CURRENT) USE OF MEDICATIONS: ICD-10-CM

## 2023-01-10 RX ORDER — BUTALBITAL, ACETAMINOPHEN AND CAFFEINE 50; 325; 40 MG/1; MG/1; MG/1
1 TABLET ORAL EVERY 12 HOURS PRN
Qty: 30 TABLET | Refills: 1 | OUTPATIENT
Start: 2023-01-10

## 2023-01-10 NOTE — TELEPHONE ENCOUNTER
Refill Routing Note   Medication(s) are not appropriate for processing by [facility] for the following reason(s):      - Outside of protocol  - Medication not previously prescribed by PCP    [facility] action(s):  Route          Medication reconciliation completed: No     Appointments  past 12m or future 3m with PCP    Date Provider   Last Visit   9/12/2022 Paul Larson MD   Next Visit   Visit date not found Paul Larson MD   ED visits in past 90 days: 0        Note composed:4:17 PM 01/10/2023

## 2023-01-11 ENCOUNTER — PATIENT MESSAGE (OUTPATIENT)
Dept: FAMILY MEDICINE | Facility: CLINIC | Age: 33
End: 2023-01-11

## 2023-01-12 ENCOUNTER — OFFICE VISIT (OUTPATIENT)
Dept: FAMILY MEDICINE | Facility: CLINIC | Age: 33
End: 2023-01-12
Payer: MEDICAID

## 2023-01-12 VITALS
SYSTOLIC BLOOD PRESSURE: 110 MMHG | BODY MASS INDEX: 25.21 KG/M2 | HEART RATE: 100 BPM | OXYGEN SATURATION: 97 % | HEIGHT: 64 IN | DIASTOLIC BLOOD PRESSURE: 70 MMHG | WEIGHT: 147.69 LBS

## 2023-01-12 DIAGNOSIS — G43.709 CHRONIC MIGRAINE WITHOUT AURA WITHOUT STATUS MIGRAINOSUS, NOT INTRACTABLE: Primary | ICD-10-CM

## 2023-01-12 DIAGNOSIS — Z79.899 ENCOUNTER FOR LONG-TERM (CURRENT) USE OF MEDICATIONS: ICD-10-CM

## 2023-01-12 DIAGNOSIS — J42 CHRONIC BRONCHITIS, UNSPECIFIED CHRONIC BRONCHITIS TYPE: ICD-10-CM

## 2023-01-12 DIAGNOSIS — K21.9 GASTROESOPHAGEAL REFLUX DISEASE WITHOUT ESOPHAGITIS: ICD-10-CM

## 2023-01-12 DIAGNOSIS — F17.200 TOBACCO DEPENDENCE SYNDROME: Chronic | ICD-10-CM

## 2023-01-12 DIAGNOSIS — Z87.898 HISTORY OF SEIZURES: ICD-10-CM

## 2023-01-12 DIAGNOSIS — Z92.29 PERSONAL HISTORY OF HIGH RISK MEDICATION TREATMENT: Chronic | ICD-10-CM

## 2023-01-12 PROBLEM — G89.29 CHRONIC NONINTRACTABLE HEADACHE: Status: RESOLVED | Noted: 2022-04-30 | Resolved: 2023-01-12

## 2023-01-12 PROBLEM — R51.9 CHRONIC NONINTRACTABLE HEADACHE: Status: RESOLVED | Noted: 2022-04-30 | Resolved: 2023-01-12

## 2023-01-12 PROCEDURE — 3008F BODY MASS INDEX DOCD: CPT | Mod: CPTII,,, | Performed by: NURSE PRACTITIONER

## 2023-01-12 PROCEDURE — 1159F MED LIST DOCD IN RCRD: CPT | Mod: CPTII,,, | Performed by: NURSE PRACTITIONER

## 2023-01-12 PROCEDURE — 99215 OFFICE O/P EST HI 40 MIN: CPT | Mod: PBBFAC,PO | Performed by: NURSE PRACTITIONER

## 2023-01-12 PROCEDURE — 96372 THER/PROPH/DIAG INJ SC/IM: CPT | Mod: PBBFAC,PO

## 2023-01-12 PROCEDURE — 99999 PR PBB SHADOW E&M-EST. PATIENT-LVL V: ICD-10-PCS | Mod: PBBFAC,,, | Performed by: NURSE PRACTITIONER

## 2023-01-12 PROCEDURE — 99214 OFFICE O/P EST MOD 30 MIN: CPT | Mod: S$PBB,,, | Performed by: NURSE PRACTITIONER

## 2023-01-12 PROCEDURE — 3074F SYST BP LT 130 MM HG: CPT | Mod: CPTII,,, | Performed by: NURSE PRACTITIONER

## 2023-01-12 PROCEDURE — 3074F PR MOST RECENT SYSTOLIC BLOOD PRESSURE < 130 MM HG: ICD-10-PCS | Mod: CPTII,,, | Performed by: NURSE PRACTITIONER

## 2023-01-12 PROCEDURE — 1159F PR MEDICATION LIST DOCUMENTED IN MEDICAL RECORD: ICD-10-PCS | Mod: CPTII,,, | Performed by: NURSE PRACTITIONER

## 2023-01-12 PROCEDURE — 99999 PR PBB SHADOW E&M-EST. PATIENT-LVL V: CPT | Mod: PBBFAC,,, | Performed by: NURSE PRACTITIONER

## 2023-01-12 PROCEDURE — 3078F DIAST BP <80 MM HG: CPT | Mod: CPTII,,, | Performed by: NURSE PRACTITIONER

## 2023-01-12 PROCEDURE — 99214 PR OFFICE/OUTPT VISIT, EST, LEVL IV, 30-39 MIN: ICD-10-PCS | Mod: S$PBB,,, | Performed by: NURSE PRACTITIONER

## 2023-01-12 PROCEDURE — 3008F PR BODY MASS INDEX (BMI) DOCUMENTED: ICD-10-PCS | Mod: CPTII,,, | Performed by: NURSE PRACTITIONER

## 2023-01-12 PROCEDURE — 3078F PR MOST RECENT DIASTOLIC BLOOD PRESSURE < 80 MM HG: ICD-10-PCS | Mod: CPTII,,, | Performed by: NURSE PRACTITIONER

## 2023-01-12 RX ORDER — BUPRENORPHINE 100 MG/1
SOLUTION SUBCUTANEOUS
COMMUNITY
Start: 2022-10-26

## 2023-01-12 RX ORDER — HYDROXYZINE PAMOATE 50 MG/1
50 CAPSULE ORAL DAILY PRN
COMMUNITY
Start: 2023-01-03

## 2023-01-12 RX ORDER — MUPIROCIN 20 MG/G
OINTMENT TOPICAL 2 TIMES DAILY
COMMUNITY
Start: 2022-11-07

## 2023-01-12 RX ORDER — PANTOPRAZOLE SODIUM 40 MG/1
40 TABLET, DELAYED RELEASE ORAL DAILY
Qty: 90 TABLET | Refills: 4 | Status: SHIPPED | OUTPATIENT
Start: 2023-01-12 | End: 2023-01-27 | Stop reason: SDUPTHER

## 2023-01-12 RX ORDER — ATOMOXETINE 60 MG/1
60 CAPSULE ORAL EVERY MORNING
COMMUNITY
Start: 2023-01-03

## 2023-01-12 RX ORDER — BUTALBITAL, ACETAMINOPHEN AND CAFFEINE 50; 325; 40 MG/1; MG/1; MG/1
1 TABLET ORAL EVERY 12 HOURS PRN
Qty: 30 TABLET | Refills: 1 | Status: CANCELLED | OUTPATIENT
Start: 2023-01-12

## 2023-01-12 RX ORDER — QUETIAPINE FUMARATE 300 MG/1
300 TABLET, FILM COATED ORAL NIGHTLY
COMMUNITY
Start: 2023-01-03

## 2023-01-12 RX ORDER — PNV 112/IRON/FOLIC/OM3/DHA/EPA 3.33-.33MG
2 TABLET,CHEWABLE ORAL DAILY
Qty: 180 TABLET | Refills: 4 | Status: SHIPPED | OUTPATIENT
Start: 2023-01-12 | End: 2023-01-27 | Stop reason: SDUPTHER

## 2023-01-12 RX ORDER — METHYLPHENIDATE HYDROCHLORIDE 10 MG/1
10 TABLET ORAL 2 TIMES DAILY
COMMUNITY
Start: 2023-01-11

## 2023-01-12 RX ORDER — PERMETHRIN 50 MG/G
CREAM TOPICAL
COMMUNITY
Start: 2022-11-07

## 2023-01-12 RX ORDER — DULOXETIN HYDROCHLORIDE 30 MG/1
30 CAPSULE, DELAYED RELEASE ORAL
COMMUNITY
Start: 2023-01-03

## 2023-01-12 RX ORDER — IBUPROFEN 200 MG
1 TABLET ORAL DAILY
Qty: 28 PATCH | Refills: 0 | Status: SHIPPED | OUTPATIENT
Start: 2023-01-12 | End: 2023-01-27 | Stop reason: SDUPTHER

## 2023-01-12 RX ORDER — DEXAMETHASONE SODIUM PHOSPHATE 4 MG/ML
8 INJECTION, SOLUTION INTRA-ARTICULAR; INTRALESIONAL; INTRAMUSCULAR; INTRAVENOUS; SOFT TISSUE ONCE
Status: COMPLETED | OUTPATIENT
Start: 2023-01-12 | End: 2023-01-12

## 2023-01-12 RX ORDER — ONDANSETRON 8 MG/1
8 TABLET, ORALLY DISINTEGRATING ORAL EVERY 6 HOURS PRN
Qty: 30 TABLET | Refills: 1 | Status: SHIPPED | OUTPATIENT
Start: 2023-01-12 | End: 2023-01-27 | Stop reason: SDUPTHER

## 2023-01-12 RX ORDER — ALBUTEROL SULFATE 90 UG/1
2 AEROSOL, METERED RESPIRATORY (INHALATION) EVERY 6 HOURS PRN
Qty: 18 G | Refills: 1 | Status: SHIPPED | OUTPATIENT
Start: 2023-01-12 | End: 2023-01-27 | Stop reason: SDUPTHER

## 2023-01-12 RX ADMIN — DEXAMETHASONE SODIUM PHOSPHATE 8 MG: 4 INJECTION INTRA-ARTICULAR; INTRALESIONAL; INTRAMUSCULAR; INTRAVENOUS; SOFT TISSUE at 10:01

## 2023-01-12 NOTE — PROGRESS NOTES
"Assessment/Plan:  Problem List Items Addressed This Visit          Neuro    Chronic migraine without aura without status migrainosus, not intractable - Primary    Overview     Chronic problem. Uncontrolled.  Onset several years ago  Located bifrontal. Described as pounding.   Associated symptoms- photophobia, phonophobia, nausea/vomiting (taking Zofran w relief), worsened by exertion  Aggravating Factors - stress, loud noises and lights  Following with neurology, last appt 10/2021  Currently on Fioricet with relief reported, requesting refills today. Advised Fioricet is a controlled substance; would like to avoid long term use of Fioricet to avoid risks of abuse/dependence.     Recommend trial of Ubrelvy and close follow up with neurology   She has failed several medications in the past:   Past meds:  Dilantin  Topiramate  Depakote  Lamictal  Phenobarbital  Tripitans         Relevant Medications    ondansetron (ZOFRAN-ODT) 8 MG TbDL    ubrogepant (UBRELVY) 100 mg tablet    History of seizures    Overview     Chronic. Stable. She has a history of epilepsy with seizures that started when she was about 20. She has two seizure types: 1) grand mal seizures 2) "petit mal" - she would have an indescribable "funny feeling" and then go unresponsive. She notes all of her seizures have been drug induced--methamphetamine or tramadol.     Previous Replaced by [facility] 6/19/2018. EEGs from Dr. Barrios at [facility] showed "bilateral interictal" activity but also says no spike and wave discharges consistent with epilepsy. MRI brain in 2012 was normal. Repeat EEG completed yesterday- results pending.             Pulmonary    Chronic bronchitis    Overview     Chronic problem. Intermittent control.  Recently treated for bronchitis a couple months ago. Reports recurrent flares.  c/o occasional cough, wheezing, and SOB. She is using albuterol inhaler with some relief. Requesting nebulizer.   She is a current everyday cigarette smoker- see problem "   She had recently been treated with x2 rounds of ABX. She had prednisone x5 days in November.  She does not see pulmonology  Recent CXR reviewed; WNL.     X-Ray Chest PA And Lateral  Narrative: EXAMINATION:  XR CHEST PA AND LATERAL    CLINICAL HISTORY:  Bronchitis, not specified as acute or chronic    TECHNIQUE:  PA and lateral views of the chest were performed.    COMPARISON:  04/10/2014    FINDINGS:  Heart size, pulmonary vessels, and mediastinal contours are normal.  Lungs are clear.  No pleural effusion or pneumothorax are identified.  Skeletal structures are intact.  There has been no significant change.  Impression: No radiographic evidence of active chest disease.    Electronically signed by: Miguel A Dixon MD  Date:    11/04/2022  Time:    09:19           Relevant Medications    albuterol (PROVENTIL HFA) 90 mcg/actuation inhaler    dexAMETHasone injection 8 mg (Completed)    Other Relevant Orders    NEBULIZER FOR HOME USE       GI    Gastroesophageal reflux disease without esophagitis    Overview     -chronic. stable  -symptoms controlled with daily PPI; requesting refill  -denies alarm symptoms, such as dysphagia, weight loss, chest pain, melena, hematemesis, or N/V  -continue lifestyle modification with avoidance of acidic foods, caffeine, late night eating, spicy foods, and NSAIDs         Relevant Medications    pantoprazole (PROTONIX) 40 MG tablet       Palliative Care    Personal history of high risk medication treatment (Chronic)    Overview     See .  Requesting records from Addiction Medicine and Neurology.  Patient reports that she was recently discharged out of previous Neurology Clinic for no-shows.    Jan 2023: Following with [facility], Dr. Tremaine Viera in [place]. Currently on buprenorphine injections one a month. Will request external records.             Other    Tobacco dependence syndrome (Chronic)    Overview     -chronic problem. Current every day cigarette smoker, 0.5  PPD  -the patient was counseled on the dangers of tobacco use  -reviewed strategies to maximize success, including counseling, nicotine replacement therapy and pharmacologic option. She would like to try nicotine patches.   -tobacco cessation class offered. Referral to smoking cessation placed.     Time spent: 3-10 minutes         Relevant Medications    nicotine (NICODERM CQ) 21 mg/24 hr    Other Relevant Orders    Ambulatory referral/consult to Smoking Cessation Program    Encounter for long-term (current) use of medications (Chronic)    Overview       Patient is on CHRONIC long-term drug therapy for managed conditions. See medication list. Reports compliance.  No side effects reported.  Routine lab work is being monitored.  Patient does need refills today.     Lab Results   Component Value Date    WBC 7.04 05/20/2022    HGB 12.6 05/20/2022    HCT 37.5 05/20/2022    MCV 90 05/20/2022     05/20/2022       Chemistry        Component Value Date/Time     04/09/2019 0903    K 4.5 04/09/2019 0903    CL 99 04/09/2019 0903    CO2 31 (H) 04/09/2019 0903    BUN 11 04/09/2019 0903    CREATININE 0.7 04/09/2019 0903     (H) 02/24/2022 1007        Component Value Date/Time    CALCIUM 9.4 04/09/2019 0903    ALKPHOS 113 04/09/2019 0903    AST 32 04/09/2019 0903    ALT 32 04/09/2019 0903    BILITOT 0.3 04/09/2019 0903    ESTGFRAFRICA >60.0 04/09/2019 0903    EGFRNONAA >60.0 04/09/2019 0903          Lab Results   Component Value Date    TSH 0.827 11/02/2021    FREET4 1.31 03/04/2010          Current Assessment & Plan     Complete history and physical was completed today.  Complete and thorough medication reconciliation was performed.  Discussed risks and benefits of medications.  Advised patient on orders and health maintenance.  We discussed old records and old labs if available.  Will request any records not available through epic.  Continue current medications listed on your summary sheet.         Relevant  Medications    VITAFOL GUMMIES 3.33 mg iron- 0.33 mg Chew     Follow up in about 3 months (around 4/12/2023), or if symptoms worsen or fail to improve, for follow up with PCP.  ER precautions for severe or worsening symptoms.     Sendy Mcrae NP  _____________________________________________________________________________________________________________________________________________________    CC: medication refills     HPI: Patient is a 32-year-in clinic today as an established patient here for medication refills. Chronic condition has been reviewed. Further details as stated above.     Headache:  Location/Radiation - bifrontal  Quality - pounding  Duration - 30 min - 1 day  Intensity (range) - 3-10/10  Frequency - nearly every day   Aggravating Factors - stress, loud noises and lights  Alleviating Factors - Fioricet  Prodrome/Aura - no  HA today? - 2/10  Time of day of most headaches- upon awakening when oversleeping  Sleep - wakes w/ HA's when she oversleeps, wakes frequently, snores, doesn't wakes feeling refreshed, resolution of headache with sleep  Associated symptoms- photophobia, phonophobia, nausea/vomiting, worsened by exertion     Past Medical History:  Past Medical History:   Diagnosis Date    Abnormal Pap smear     ALLERGIC RHINITIS     Anemia     Anxiety     Bipolar 1 disorder     Chronic back pain     Drug-seeking behavior 4/16/2012    Epilepsy     GERD (gastroesophageal reflux disease)     GERD (gastroesophageal reflux disease)     Hepatitis C 2018    Medications given     Opioid dependence 4/16/2012    Pre-eclampsia     Seizures     last episode 2020, EEG-normal;     Urinary tract infection      Past Surgical History:   Procedure Laterality Date    CHOLECYSTECTOMY      LAPAROSCOPIC LIGATION OF FALLOPIAN TUBE Bilateral 5/23/2022    Procedure: LIGATION, FALLOPIAN TUBE, LAPAROSCOPIC;  Surgeon: Kristian Landaverde MD;  Location: [facility];  Service: OB/GYN;  Laterality: Bilateral;    mirena removal   02/2018    PELVIC LAPAROSCOPY      iud retrieval     VAGINAL DELIVERY      times 4    vestibulectomy      WISDOM TOOTH EXTRACTION       Review of patient's allergies indicates:   Allergen Reactions    Darvocet a500 [propoxyphene n-acetaminophen]     Phenytoin sodium extended Other (See Comments)     Social History     Tobacco Use    Smoking status: Every Day     Packs/day: 0.50     Years: 10.00     Pack years: 5.00     Types: Cigarettes, Vaping with nicotine     Passive exposure: Never    Smokeless tobacco: Never    Tobacco comments:     Trying to quit   Substance Use Topics    Alcohol use: Not Currently     Comment: occasional    Drug use: Not Currently     Types: Hydrocodone     Comment: past histoy last 4/7/2022     Family History   Problem Relation Age of Onset    Stroke Mother     Depression Mother         Anxiety depression and heart    Heart disease Mother         Heart problems and stroke    Depression Sister         Anxiety depression    Heart disease Maternal Grandmother         Heart issues (murmur)    Early death Sister         Her two daughters (seperate pregnancy stillborn twice)    Breast cancer Neg Hx     Ovarian cancer Neg Hx      Current Outpatient Medications on File Prior to Visit   Medication Sig Dispense Refill    atomoxetine (STRATTERA) 60 MG capsule Take 60 mg by mouth every morning.      buprenorphine (SUBLOCADE) 100 mg/0.5 mL injection       busPIRone (BUSPAR) 10 MG tablet Take 10 mg by mouth 2 (two) times daily.      butalbital-acetaminophen-caffeine -40 mg (FIORICET, ESGIC) -40 mg per tablet Take 1 tablet by mouth every 12 (twelve) hours as needed for Headaches (Migraines). 30 tablet 1    DULoxetine (CYMBALTA) 30 MG capsule Take 30 mg by mouth.      gabapentin (NEURONTIN) 800 MG tablet Take 1 tablet (800 mg total) by mouth 3 (three) times daily. 90 tablet 0    hydrOXYzine pamoate (VISTARIL) 50 MG Cap Take 50 mg by mouth daily as needed.      levETIRAcetam (KEPPRA) 750 MG Tab  Take 2 tablets (1,500 mg total) by mouth 2 (two) times daily. 240 tablet 0    methylphenidate HCl (RITALIN) 10 MG tablet Take 10 mg by mouth.      mupirocin (BACTROBAN) 2 % ointment 2 (two) times daily. apply to affected area      permethrin (ELIMITE) 5 % cream Apply topically.      QUEtiapine (SEROQUEL) 300 MG Tab Take 300 mg by mouth every evening.      sertraline (ZOLOFT) 50 MG tablet Take 50 mg by mouth once daily.      [DISCONTINUED] albuterol (PROVENTIL HFA) 90 mcg/actuation inhaler Inhale 2 puffs into the lungs every 6 (six) hours as needed for Wheezing. Rescue 18 g 1    [DISCONTINUED] ondansetron (ZOFRAN-ODT) 8 MG TbDL Take 1 tablet (8 mg total) by mouth every 6 (six) hours as needed (nausea). 30 tablet 1    [DISCONTINUED] pantoprazole (PROTONIX) 40 MG tablet Take 1 tablet (40 mg total) by mouth once daily. 90 tablet 4    [DISCONTINUED] VITAFOL GUMMIES 3.33 mg iron- 0.33 mg Chew Take 2 tablets by mouth once daily at 6am. 180 tablet 4    [DISCONTINUED] levomefolate calcium (ELFOLATE) 15 mg Tab       [DISCONTINUED] ranitidine (ZANTAC) 150 MG tablet Take 1 tablet (150 mg total) by mouth 2 (two) times daily as needed. 60 tablet 11     No current facility-administered medications on file prior to visit.     Review of Systems   Constitutional:  Negative for appetite change, chills, fatigue, fever and unexpected weight change.   HENT:  Negative for congestion, ear pain, postnasal drip, rhinorrhea and sore throat.    Eyes:  Negative for pain and visual disturbance.   Respiratory:  Positive for cough and wheezing. Negative for shortness of breath.    Cardiovascular:  Negative for chest pain, palpitations and leg swelling.   Gastrointestinal:  Negative for abdominal pain, diarrhea and vomiting.        Reflux   Genitourinary:  Negative for difficulty urinating, dysuria and hematuria.   Musculoskeletal:  Negative for arthralgias and myalgias.   Skin:  Negative for color change, rash and wound.   Allergic/Immunologic:  "Positive for environmental allergies.   Neurological:  Positive for headaches. Negative for dizziness, tremors, seizures, syncope, speech difficulty, weakness and numbness.   Psychiatric/Behavioral:  Negative for behavioral problems, dysphoric mood and sleep disturbance. The patient is not nervous/anxious.      Vitals:    01/12/23 0925   BP: 110/70   BP Location: Left arm   Pulse: 100   SpO2: 97%   Weight: 67 kg (147 lb 11.3 oz)   Height: 5' 4" (1.626 m)     Wt Readings from Last 3 Encounters:   01/12/23 67 kg (147 lb 11.3 oz)   09/12/22 64.8 kg (142 lb 15.5 oz)   06/09/22 66.2 kg (145 lb 15.1 oz)     Physical Exam  Vitals reviewed.   Constitutional:       General: She is not in acute distress.     Appearance: Normal appearance. She is not ill-appearing.   HENT:      Head: Normocephalic and atraumatic.      Right Ear: Ear canal and external ear normal.      Left Ear: Ear canal and external ear normal.      Nose: Nose normal. No congestion or rhinorrhea.      Mouth/Throat:      Mouth: Mucous membranes are moist.      Pharynx: No oropharyngeal exudate or posterior oropharyngeal erythema.   Eyes:      Extraocular Movements: Extraocular movements intact.      Conjunctiva/sclera: Conjunctivae normal.   Cardiovascular:      Rate and Rhythm: Normal rate.      Heart sounds: Normal heart sounds.   Pulmonary:      Effort: Pulmonary effort is normal. No respiratory distress.      Breath sounds: Normal breath sounds. No wheezing.   Abdominal:      General: Abdomen is flat. There is no distension.   Musculoskeletal:         General: Normal range of motion.      Cervical back: Normal range of motion and neck supple.   Lymphadenopathy:      Cervical: No cervical adenopathy.   Skin:     General: Skin is warm and dry.      Coloration: Skin is not pale.      Findings: No rash.   Neurological:      Mental Status: She is alert and oriented to person, place, and time. Mental status is at baseline.      GCS: GCS eye subscore is 4. GCS " verbal subscore is 5. GCS motor subscore is 6.      Sensory: Sensation is intact.      Motor: Motor function is intact. No weakness, tremor or seizure activity.      Coordination: Coordination is intact.      Gait: Gait normal.   Psychiatric:         Mood and Affect: Mood normal. Affect is not flat, angry or tearful.         Speech: Speech normal. Speech is not rapid and pressured, delayed or slurred.         Behavior: Behavior is not agitated, slowed, aggressive, withdrawn or hyperactive. Behavior is cooperative.     Health Maintenance   Topic Date Due    TETANUS VACCINE  04/10/2032    Hepatitis C Screening  Completed    Lipid Panel  Completed

## 2023-01-12 NOTE — PROGRESS NOTES
"Nebulizer -- [facility]   Current smoker 0.5 PPD   Wellbutrin +   Nicotine patch +  Xopenex+   Smoking cessation      Neurology- 1/23  EEG yesterday- results pending [facility]   Possible drug inducted   Taking Keppra   Years since last seizure activity     Tried Maxalt and imitrex in the past   Taking Fioricet       EEGs from Dr. Barrios at [facility] showed "bilateral interictal" activity but also says no spike and wave discharges consistent with epilepsy. MRI brain in 2012 was normal.      Steroid shot+   "

## 2023-01-13 ENCOUNTER — TELEPHONE (OUTPATIENT)
Dept: NEUROLOGY | Facility: CLINIC | Age: 33
End: 2023-01-13
Payer: MEDICAID

## 2023-01-13 NOTE — TELEPHONE ENCOUNTER
----- Message from Julieta Lazcano MD sent at 1/13/2023  2:29 PM CST -----  Please let her know her EEG is abnormal. We can go over more details at her followup appointment but I would recommend that she be on keppra for protection against seizures.

## 2023-01-17 ENCOUNTER — PATIENT MESSAGE (OUTPATIENT)
Dept: NEUROLOGY | Facility: CLINIC | Age: 33
End: 2023-01-17
Payer: MEDICAID

## 2023-01-18 ENCOUNTER — PATIENT MESSAGE (OUTPATIENT)
Dept: NEUROLOGY | Facility: CLINIC | Age: 33
End: 2023-01-18
Payer: MEDICAID

## 2023-01-23 ENCOUNTER — TELEPHONE (OUTPATIENT)
Dept: NEUROLOGY | Facility: CLINIC | Age: 33
End: 2023-01-23

## 2023-01-23 ENCOUNTER — OFFICE VISIT (OUTPATIENT)
Dept: NEUROLOGY | Facility: CLINIC | Age: 33
End: 2023-01-23
Payer: MEDICAID

## 2023-01-23 VITALS
HEART RATE: 82 BPM | RESPIRATION RATE: 17 BRPM | SYSTOLIC BLOOD PRESSURE: 114 MMHG | WEIGHT: 142.06 LBS | HEIGHT: 64 IN | BODY MASS INDEX: 24.25 KG/M2 | DIASTOLIC BLOOD PRESSURE: 62 MMHG

## 2023-01-23 DIAGNOSIS — R56.9 SEIZURES: ICD-10-CM

## 2023-01-23 DIAGNOSIS — G43.109 MIGRAINE WITH AURA AND WITHOUT STATUS MIGRAINOSUS, NOT INTRACTABLE: Primary | ICD-10-CM

## 2023-01-23 DIAGNOSIS — Z79.899 ENCOUNTER FOR LONG-TERM (CURRENT) USE OF MEDICATIONS: ICD-10-CM

## 2023-01-23 PROCEDURE — 99999 PR PBB SHADOW E&M-EST. PATIENT-LVL V: ICD-10-PCS | Mod: PBBFAC,,, | Performed by: PSYCHIATRY & NEUROLOGY

## 2023-01-23 PROCEDURE — 99999 PR PBB SHADOW E&M-EST. PATIENT-LVL V: CPT | Mod: PBBFAC,,, | Performed by: PSYCHIATRY & NEUROLOGY

## 2023-01-23 PROCEDURE — 1159F PR MEDICATION LIST DOCUMENTED IN MEDICAL RECORD: ICD-10-PCS | Mod: CPTII,,, | Performed by: PSYCHIATRY & NEUROLOGY

## 2023-01-23 PROCEDURE — 1160F PR REVIEW ALL MEDS BY PRESCRIBER/CLIN PHARMACIST DOCUMENTED: ICD-10-PCS | Mod: CPTII,,, | Performed by: PSYCHIATRY & NEUROLOGY

## 2023-01-23 PROCEDURE — 3078F DIAST BP <80 MM HG: CPT | Mod: CPTII,,, | Performed by: PSYCHIATRY & NEUROLOGY

## 2023-01-23 PROCEDURE — 99215 PR OFFICE/OUTPT VISIT, EST, LEVL V, 40-54 MIN: ICD-10-PCS | Mod: S$PBB,,, | Performed by: PSYCHIATRY & NEUROLOGY

## 2023-01-23 PROCEDURE — 3078F PR MOST RECENT DIASTOLIC BLOOD PRESSURE < 80 MM HG: ICD-10-PCS | Mod: CPTII,,, | Performed by: PSYCHIATRY & NEUROLOGY

## 2023-01-23 PROCEDURE — 3008F BODY MASS INDEX DOCD: CPT | Mod: CPTII,,, | Performed by: PSYCHIATRY & NEUROLOGY

## 2023-01-23 PROCEDURE — 3074F PR MOST RECENT SYSTOLIC BLOOD PRESSURE < 130 MM HG: ICD-10-PCS | Mod: CPTII,,, | Performed by: PSYCHIATRY & NEUROLOGY

## 2023-01-23 PROCEDURE — 3074F SYST BP LT 130 MM HG: CPT | Mod: CPTII,,, | Performed by: PSYCHIATRY & NEUROLOGY

## 2023-01-23 PROCEDURE — 99215 OFFICE O/P EST HI 40 MIN: CPT | Mod: PBBFAC,PO | Performed by: PSYCHIATRY & NEUROLOGY

## 2023-01-23 PROCEDURE — 1159F MED LIST DOCD IN RCRD: CPT | Mod: CPTII,,, | Performed by: PSYCHIATRY & NEUROLOGY

## 2023-01-23 PROCEDURE — 3008F PR BODY MASS INDEX (BMI) DOCUMENTED: ICD-10-PCS | Mod: CPTII,,, | Performed by: PSYCHIATRY & NEUROLOGY

## 2023-01-23 PROCEDURE — 1160F RVW MEDS BY RX/DR IN RCRD: CPT | Mod: CPTII,,, | Performed by: PSYCHIATRY & NEUROLOGY

## 2023-01-23 PROCEDURE — 99215 OFFICE O/P EST HI 40 MIN: CPT | Mod: S$PBB,,, | Performed by: PSYCHIATRY & NEUROLOGY

## 2023-01-23 RX ORDER — LEVETIRACETAM 750 MG/1
1500 TABLET ORAL 2 TIMES DAILY
Qty: 360 TABLET | Refills: 3 | Status: SHIPPED | OUTPATIENT
Start: 2023-01-23 | End: 2024-02-07

## 2023-01-23 NOTE — PROGRESS NOTES
"  Date: 1/23/2023    Patient ID: Sara Sanford is a 32 y.o. female.    Chief Complaint: Seizures      History of Present Illness:  Ms. Sanford is a 32 y.o. female who presents for followup of epilepsy. She was last seen in October 2021 while pregnant and did not followup as recommended.    Interval history:  She had an EEG in our system that showed sharply contoured slowing. While no epileptiform activity was seen, this was abnormal and I recommended that she be on keppra.     She is now back on keppra 1500 mg BID and level in Nov 2022 was 17. No seizures. Her last seizure was in 2019 or 2020. No side effects.     She has headaches and was prescribed Fioricet. She takes this often. She reports her headaches are severe. She sometimes has auras. She was seen in 2020 by JULIAN Moreno, who recommended Dc'ing Fioricet.      Seizure history:  She has a history of epilepsy with seizures that started when she was about 20. She has two seizure types: 1) grand mal seizures 2) "petit mal" - she would have an indescribable funny feeling and then go unresponsive. She notes all of her seizures have been triggered by drug use--methamphetamine or tramadol.      She was tried on different meds and keppra worked best for her. In October 2021 she was off keppra and seizure-free for about 18 months.      Past meds:  Dilantin  Topiramate  Depakote  Lamictal  Phenobarbital     She sees a counselor and she has anxiety. She is off her medication and she has been struggling lately.      EEGs from Dr. Barrios at [facility] showed "bilateral interictal" activity but also says no spike and wave discharges consistent with epilepsy. MRI brain in 2012 was normal.        Allergies:  Review of patient's allergies indicates:   Allergen Reactions    Darvocet a500 [propoxyphene n-acetaminophen]     Phenytoin sodium extended Other (See Comments)       Current Medications:  Current Outpatient Medications   Medication Sig Dispense Refill    albuterol " (PROVENTIL HFA) 90 mcg/actuation inhaler Inhale 2 puffs into the lungs every 6 (six) hours as needed for Wheezing. Rescue 18 g 1    atomoxetine (STRATTERA) 60 MG capsule Take 60 mg by mouth every morning.      buprenorphine (SUBLOCADE) 100 mg/0.5 mL injection       busPIRone (BUSPAR) 10 MG tablet Take 10 mg by mouth 2 (two) times daily.      butalbital-acetaminophen-caffeine -40 mg (FIORICET, ESGIC) -40 mg per tablet Take 1 tablet by mouth every 12 (twelve) hours as needed for Headaches (Migraines). 30 tablet 1    DULoxetine (CYMBALTA) 30 MG capsule Take 30 mg by mouth.      gabapentin (NEURONTIN) 800 MG tablet Take 1 tablet (800 mg total) by mouth 3 (three) times daily. 90 tablet 0    hydrOXYzine pamoate (VISTARIL) 50 MG Cap Take 50 mg by mouth daily as needed.      methylphenidate HCl (RITALIN) 10 MG tablet Take 10 mg by mouth 2 (two) times a day.      mupirocin (BACTROBAN) 2 % ointment 2 (two) times daily. apply to affected area      nicotine (NICODERM CQ) 21 mg/24 hr Place 1 patch onto the skin once daily. 28 patch 0    ondansetron (ZOFRAN-ODT) 8 MG TbDL Take 1 tablet (8 mg total) by mouth every 6 (six) hours as needed (nausea). 30 tablet 1    pantoprazole (PROTONIX) 40 MG tablet Take 1 tablet (40 mg total) by mouth once daily. 90 tablet 4    permethrin (ELIMITE) 5 % cream Apply topically.      QUEtiapine (SEROQUEL) 300 MG Tab Take 300 mg by mouth every evening.      sertraline (ZOLOFT) 50 MG tablet Take 50 mg by mouth once daily.      ubrogepant (UBRELVY) 100 mg tablet Take 1 tablet at start of Headache, then repeat in 2 hours if needed. Max of 2 per day. 10 tablet 2    VITAFOL GUMMIES 3.33 mg iron- 0.33 mg Chew Take 2 tablets by mouth once daily. 180 tablet 4    levETIRAcetam (KEPPRA) 750 MG Tab Take 2 tablets (1,500 mg total) by mouth 2 (two) times daily. 360 tablet 3     No current facility-administered medications for this visit.       Past Medical History:  Past Medical History:   Diagnosis  "Date    Abnormal Pap smear     ALLERGIC RHINITIS     Anemia     Anxiety     Bipolar 1 disorder     Chronic back pain     Drug-seeking behavior 4/16/2012    Epilepsy     GERD (gastroesophageal reflux disease)     GERD (gastroesophageal reflux disease)     Hepatitis C 2018    Medications given     Opioid dependence 4/16/2012    Pre-eclampsia     Seizures     last episode 2020, EEG-normal;     Urinary tract infection        Past Surgical History:  Past Surgical History:   Procedure Laterality Date    CHOLECYSTECTOMY      LAPAROSCOPIC LIGATION OF FALLOPIAN TUBE Bilateral 5/23/2022    Procedure: LIGATION, FALLOPIAN TUBE, LAPAROSCOPIC;  Surgeon: Kristian Landaverde MD;  Location: [facility];  Service: OB/GYN;  Laterality: Bilateral;    mirena removal  02/2018    PELVIC LAPAROSCOPY      iud retrieval     VAGINAL DELIVERY      times 4    vestibulectomy      WISDOM TOOTH EXTRACTION         Family History:  family history includes Depression in her mother and sister; Early death in her sister; Heart disease in her maternal grandmother and mother; Stroke in her mother.    Social History:   reports that she has been smoking cigarettes and vaping with nicotine. She has a 5.00 pack-year smoking history. She has never been exposed to tobacco smoke. She has never used smokeless tobacco. She reports that she does not currently use alcohol. She reports that she does not currently use drugs after having used the following drugs: Hydrocodone.    Physical Exam:  Vitals:    01/23/23 1512   BP: 114/62   Pulse: 82   Resp: 17   Weight: 64.4 kg (142 lb 1.4 oz)   Height: 5' 4" (1.626 m)   PainSc: 0-No pain     Body mass index is 24.39 kg/m².    Neurological Exam:  Mental status: Awake and alert  Speech language: No dysarthria or aphasia on conversation  Cranial nerves: Face symmetric  Motor: Moves all extremities well  Coordination: No ataxia. No tremor.      Data:  I have personally reviewed other provider's notes, labs, & imaging made available " to me today.     Labs:  CBC:   Lab Results   Component Value Date    WBC 7.04 05/20/2022    HGB 12.6 05/20/2022    HCT 37.5 05/20/2022     05/20/2022    MCV 90 05/20/2022    RDW 16.0 (H) 05/20/2022     BMP:   Lab Results   Component Value Date     04/09/2019    K 4.5 04/09/2019    CL 99 04/09/2019    CO2 31 (H) 04/09/2019    BUN 11 04/09/2019    CREATININE 0.7 04/09/2019     (H) 02/24/2022    CALCIUM 9.4 04/09/2019     LFTS;   Lab Results   Component Value Date    PROT 7.0 04/09/2019    ALBUMIN 3.7 04/09/2019    BILITOT 0.3 04/09/2019    AST 32 04/09/2019    ALKPHOS 113 04/09/2019    ALT 32 04/09/2019    GGT 9 01/21/2021     COAGS:   Lab Results   Component Value Date    INR 0.9 01/21/2021     FLP:   Lab Results   Component Value Date    CHOL 100 (L) 11/04/2022    HDL 41 11/04/2022    LDLCALC 41.6 (L) 11/04/2022    TRIG 87 11/04/2022    CHOLHDL 41.0 11/04/2022         Assessment and Plan:  Ms. Sanford is a 32 y.o. female here for followup of epilepsy. Recommend she continue on keppra 1500 mg BID. No seizures in years. OK from seizure standpoint to drive. May need clearance from psych for her substance abuse history.     Given her substance abuse history and frequent headaches, I do not recommend Fioricet and will not refill this. Discussed rebound headache. She should f/u in [facility] clinic for treatment.     Migraine with aura and without status migrainosus, not intractable  -     Ambulatory consult to Neurology; Future; Expected date: 01/30/2023    Encounter for long-term (current) use of medications  -     levETIRAcetam (KEPPRA) 750 MG Tab; Take 2 tablets (1,500 mg total) by mouth 2 (two) times daily.  Dispense: 360 tablet; Refill: 3    Seizures  -     levETIRAcetam (KEPPRA) 750 MG Tab; Take 2 tablets (1,500 mg total) by mouth 2 (two) times daily.  Dispense: 360 tablet; Refill: 3         I spent a total of 40 minutes on the day of the visit.This includes face to face time and non-face to face time  preparing to see the patient (eg, review of tests), Obtaining and/or reviewing separately obtained history, Documenting clinical information in the electronic or other health record, Independently interpreting results and communicating results to the patient/family/caregiver, or Care coordination.

## 2023-01-27 ENCOUNTER — OFFICE VISIT (OUTPATIENT)
Dept: FAMILY MEDICINE | Facility: CLINIC | Age: 33
End: 2023-01-27
Payer: MEDICAID

## 2023-01-27 VITALS
BODY MASS INDEX: 23.54 KG/M2 | HEART RATE: 90 BPM | DIASTOLIC BLOOD PRESSURE: 60 MMHG | HEIGHT: 64 IN | SYSTOLIC BLOOD PRESSURE: 120 MMHG | WEIGHT: 137.88 LBS | TEMPERATURE: 99 F

## 2023-01-27 DIAGNOSIS — J42 CHRONIC BRONCHITIS, UNSPECIFIED CHRONIC BRONCHITIS TYPE: ICD-10-CM

## 2023-01-27 DIAGNOSIS — F17.200 TOBACCO DEPENDENCE SYNDROME: Chronic | ICD-10-CM

## 2023-01-27 DIAGNOSIS — Z79.899 ENCOUNTER FOR LONG-TERM (CURRENT) USE OF MEDICATIONS: ICD-10-CM

## 2023-01-27 DIAGNOSIS — K21.9 GASTROESOPHAGEAL REFLUX DISEASE WITHOUT ESOPHAGITIS: ICD-10-CM

## 2023-01-27 DIAGNOSIS — G43.709 CHRONIC MIGRAINE WITHOUT AURA WITHOUT STATUS MIGRAINOSUS, NOT INTRACTABLE: ICD-10-CM

## 2023-01-27 PROCEDURE — 99999 PR PBB SHADOW E&M-EST. PATIENT-LVL V: CPT | Mod: PBBFAC,,, | Performed by: NURSE PRACTITIONER

## 2023-01-27 PROCEDURE — 99214 PR OFFICE/OUTPT VISIT, EST, LEVL IV, 30-39 MIN: ICD-10-PCS | Mod: S$PBB,,, | Performed by: NURSE PRACTITIONER

## 2023-01-27 PROCEDURE — 3078F DIAST BP <80 MM HG: CPT | Mod: CPTII,,, | Performed by: NURSE PRACTITIONER

## 2023-01-27 PROCEDURE — 3008F BODY MASS INDEX DOCD: CPT | Mod: CPTII,,, | Performed by: NURSE PRACTITIONER

## 2023-01-27 PROCEDURE — 99214 OFFICE O/P EST MOD 30 MIN: CPT | Mod: S$PBB,,, | Performed by: NURSE PRACTITIONER

## 2023-01-27 PROCEDURE — 3074F SYST BP LT 130 MM HG: CPT | Mod: CPTII,,, | Performed by: NURSE PRACTITIONER

## 2023-01-27 PROCEDURE — 3078F PR MOST RECENT DIASTOLIC BLOOD PRESSURE < 80 MM HG: ICD-10-PCS | Mod: CPTII,,, | Performed by: NURSE PRACTITIONER

## 2023-01-27 PROCEDURE — 1159F PR MEDICATION LIST DOCUMENTED IN MEDICAL RECORD: ICD-10-PCS | Mod: CPTII,,, | Performed by: NURSE PRACTITIONER

## 2023-01-27 PROCEDURE — 99215 OFFICE O/P EST HI 40 MIN: CPT | Mod: PBBFAC,PO | Performed by: NURSE PRACTITIONER

## 2023-01-27 PROCEDURE — 3074F PR MOST RECENT SYSTOLIC BLOOD PRESSURE < 130 MM HG: ICD-10-PCS | Mod: CPTII,,, | Performed by: NURSE PRACTITIONER

## 2023-01-27 PROCEDURE — 3008F PR BODY MASS INDEX (BMI) DOCUMENTED: ICD-10-PCS | Mod: CPTII,,, | Performed by: NURSE PRACTITIONER

## 2023-01-27 PROCEDURE — 1159F MED LIST DOCD IN RCRD: CPT | Mod: CPTII,,, | Performed by: NURSE PRACTITIONER

## 2023-01-27 PROCEDURE — 99999 PR PBB SHADOW E&M-EST. PATIENT-LVL V: ICD-10-PCS | Mod: PBBFAC,,, | Performed by: NURSE PRACTITIONER

## 2023-01-27 RX ORDER — BUTALBITAL, ACETAMINOPHEN AND CAFFEINE 50; 325; 40 MG/1; MG/1; MG/1
1 TABLET ORAL DAILY
Qty: 30 TABLET | Refills: 0 | Status: SHIPPED | OUTPATIENT
Start: 2023-01-27 | End: 2023-12-31 | Stop reason: SDUPTHER

## 2023-01-27 RX ORDER — IBUPROFEN 200 MG
1 TABLET ORAL DAILY
Qty: 28 PATCH | Refills: 0 | Status: SHIPPED | OUTPATIENT
Start: 2023-01-27 | End: 2023-02-08 | Stop reason: SDUPTHER

## 2023-01-27 RX ORDER — ALBUTEROL SULFATE 2.5 MG/.5ML
2.5 SOLUTION RESPIRATORY (INHALATION) EVERY 6 HOURS PRN
Qty: 1 EACH | Refills: 2 | Status: SHIPPED | OUTPATIENT
Start: 2023-01-27 | End: 2023-03-09 | Stop reason: SDUPTHER

## 2023-01-27 RX ORDER — PANTOPRAZOLE SODIUM 40 MG/1
40 TABLET, DELAYED RELEASE ORAL DAILY
Qty: 90 TABLET | Refills: 1 | Status: SHIPPED | OUTPATIENT
Start: 2023-01-27 | End: 2023-12-30 | Stop reason: SDUPTHER

## 2023-01-27 RX ORDER — ALBUTEROL SULFATE 90 UG/1
2 AEROSOL, METERED RESPIRATORY (INHALATION) EVERY 6 HOURS PRN
Qty: 18 G | Refills: 1 | Status: SHIPPED | OUTPATIENT
Start: 2023-01-27 | End: 2023-05-27 | Stop reason: SDUPTHER

## 2023-01-27 RX ORDER — ONDANSETRON 8 MG/1
8 TABLET, ORALLY DISINTEGRATING ORAL EVERY 6 HOURS PRN
Qty: 30 TABLET | Refills: 1 | Status: SHIPPED | OUTPATIENT
Start: 2023-01-27 | End: 2023-12-30 | Stop reason: SDUPTHER

## 2023-01-27 RX ORDER — PNV 112/IRON/FOLIC/OM3/DHA/EPA 3.33-.33MG
2 TABLET,CHEWABLE ORAL DAILY
Qty: 180 TABLET | Refills: 4 | Status: SHIPPED | OUTPATIENT
Start: 2023-01-27 | End: 2023-12-30 | Stop reason: SDUPTHER

## 2023-01-27 RX ORDER — METHYLPREDNISOLONE 4 MG/1
TABLET ORAL
Qty: 1 EACH | Refills: 0 | Status: SHIPPED | OUTPATIENT
Start: 2023-01-27 | End: 2023-02-09

## 2023-01-27 NOTE — PROGRESS NOTES
Subjective:       Patient ID: Sara Sanford is a 32 y.o. female.    Chief Complaint: Medication Refill    Medication Refill  This is a chronic (Fioricet) problem. The current episode started more than 1 year ago. The problem occurs daily. The problem has been unchanged. Associated symptoms include congestion, coughing and headaches. Pertinent negatives include no abdominal pain, arthralgias, chest pain, fatigue, fever, myalgias, rash, sore throat or vomiting. The treatment provided significant relief.   Cough  This is a new problem. The current episode started 1 to 4 weeks ago. The problem has been waxing and waning. The problem occurs every few minutes. The cough is Productive of sputum. Associated symptoms include headaches, nasal congestion, postnasal drip and rhinorrhea. Pertinent negatives include no chest pain, ear pain, fever, myalgias, rash, sore throat or shortness of breath. She has tried a beta-agonist inhaler for the symptoms. The treatment provided no relief.     Review of Systems   Constitutional:  Negative for fatigue, fever and unexpected weight change.   HENT:  Positive for congestion, postnasal drip and rhinorrhea. Negative for ear pain and sore throat.    Eyes:  Negative for pain and visual disturbance.   Respiratory:  Positive for cough. Negative for shortness of breath.    Cardiovascular:  Negative for chest pain and palpitations.   Gastrointestinal:  Negative for abdominal pain, diarrhea and vomiting.   Musculoskeletal:  Negative for arthralgias and myalgias.   Skin:  Negative for color change and rash.   Neurological:  Positive for headaches. Negative for dizziness.   Psychiatric/Behavioral:  Negative for dysphoric mood and sleep disturbance. The patient is not nervous/anxious.      Vitals:    01/27/23 0922   BP: 120/60   Pulse: 90   Temp: 98.7 °F (37.1 °C)       Objective:     Current Outpatient Medications   Medication Sig Dispense Refill    atomoxetine (STRATTERA) 60 MG capsule Take 60 mg  by mouth every morning.      buprenorphine (SUBLOCADE) 100 mg/0.5 mL injection       busPIRone (BUSPAR) 10 MG tablet Take 10 mg by mouth 2 (two) times daily.      DULoxetine (CYMBALTA) 30 MG capsule Take 30 mg by mouth.      gabapentin (NEURONTIN) 800 MG tablet Take 1 tablet (800 mg total) by mouth 3 (three) times daily. 90 tablet 0    hydrOXYzine pamoate (VISTARIL) 50 MG Cap Take 50 mg by mouth daily as needed.      levETIRAcetam (KEPPRA) 750 MG Tab Take 2 tablets (1,500 mg total) by mouth 2 (two) times daily. 360 tablet 3    methylphenidate HCl (RITALIN) 10 MG tablet Take 10 mg by mouth 2 (two) times a day.      mupirocin (BACTROBAN) 2 % ointment 2 (two) times daily. apply to affected area      QUEtiapine (SEROQUEL) 300 MG Tab Take 300 mg by mouth every evening.      albuterol (PROVENTIL HFA) 90 mcg/actuation inhaler Inhale 2 puffs into the lungs every 6 (six) hours as needed for Wheezing. Rescue 18 g 1    albuterol sulfate 2.5 mg/0.5 mL Nebu Take 2.5 mg by nebulization every 6 (six) hours as needed (shortness of breath). Rescue 1 each 2    butalbital-acetaminophen-caffeine -40 mg (FIORICET, ESGIC) -40 mg per tablet Take 1 tablet by mouth once daily. 30 tablet 0    methylPREDNISolone (MEDROL DOSEPACK) 4 mg tablet use as directed 1 each 0    nicotine (NICODERM CQ) 21 mg/24 hr Place 1 patch onto the skin once daily. 28 patch 0    ondansetron (ZOFRAN-ODT) 8 MG TbDL Take 1 tablet (8 mg total) by mouth every 6 (six) hours as needed (nausea). 30 tablet 1    pantoprazole (PROTONIX) 40 MG tablet Take 1 tablet (40 mg total) by mouth once daily. 90 tablet 1    permethrin (ELIMITE) 5 % cream Apply topically.      ubrogepant (UBRELVY) 100 mg tablet Take 1 tablet at start of Headache, then repeat in 2 hours if needed. Max of 2 per day. (Patient not taking: Reported on 1/27/2023) 10 tablet 2    VITAFOL GUMMIES 3.33 mg iron- 0.33 mg Chew Take 2 tablets by mouth once daily. 180 tablet 4     No current  facility-administered medications for this visit.       Physical Exam  Vitals and nursing note reviewed.   Constitutional:       General: She is not in acute distress.     Appearance: She is well-developed.   HENT:      Head: Normocephalic and atraumatic.      Right Ear: Tympanic membrane is injected.      Mouth/Throat:      Pharynx: Pharyngeal swelling (post nasal mucus) present.   Eyes:      Pupils: Pupils are equal, round, and reactive to light.   Cardiovascular:      Rate and Rhythm: Normal rate and regular rhythm.   Pulmonary:      Effort: Pulmonary effort is normal.      Breath sounds: Normal breath sounds.      Comments: + cough  Musculoskeletal:         General: Normal range of motion.      Cervical back: Normal range of motion and neck supple.   Skin:     General: Skin is warm and dry.      Findings: No rash.   Neurological:      Mental Status: She is alert and oriented to person, place, and time.   Psychiatric:         Judgment: Judgment normal.       Assessment:       1. Chronic bronchitis, unspecified chronic bronchitis type    2. Chronic migraine without aura without status migrainosus, not intractable    3. Gastroesophageal reflux disease without esophagitis    4. Encounter for long-term (current) use of medications    5. Tobacco dependence syndrome          Plan:   Chronic bronchitis, unspecified chronic bronchitis type  -     albuterol (PROVENTIL HFA) 90 mcg/actuation inhaler; Inhale 2 puffs into the lungs every 6 (six) hours as needed for Wheezing. Rescue  Dispense: 18 g; Refill: 1    Chronic migraine without aura without status migrainosus, not intractable  -     ondansetron (ZOFRAN-ODT) 8 MG TbDL; Take 1 tablet (8 mg total) by mouth every 6 (six) hours as needed (nausea).  Dispense: 30 tablet; Refill: 1    Gastroesophageal reflux disease without esophagitis  -     pantoprazole (PROTONIX) 40 MG tablet; Take 1 tablet (40 mg total) by mouth once daily.  Dispense: 90 tablet; Refill: 1    Encounter for  long-term (current) use of medications  -     butalbital-acetaminophen-caffeine -40 mg (FIORICET, ESGIC) -40 mg per tablet; Take 1 tablet by mouth once daily.  Dispense: 30 tablet; Refill: 0  -     VITAFOL GUMMIES 3.33 mg iron- 0.33 mg Chew; Take 2 tablets by mouth once daily.  Dispense: 180 tablet; Refill: 4    Tobacco dependence syndrome  -     nicotine (NICODERM CQ) 21 mg/24 hr; Place 1 patch onto the skin once daily.  Dispense: 28 patch; Refill: 0    Other orders  -     albuterol sulfate 2.5 mg/0.5 mL Nebu; Take 2.5 mg by nebulization every 6 (six) hours as needed (shortness of breath). Rescue  Dispense: 1 each; Refill: 2  -     methylPREDNISolone (MEDROL DOSEPACK) 4 mg tablet; use as directed  Dispense: 1 each; Refill: 0        No follow-ups on file.    There are no Patient Instructions on file for this visit.

## 2023-02-08 DIAGNOSIS — Z79.899 ENCOUNTER FOR LONG-TERM (CURRENT) USE OF MEDICATIONS: ICD-10-CM

## 2023-02-08 DIAGNOSIS — F17.200 TOBACCO DEPENDENCE SYNDROME: Chronic | ICD-10-CM

## 2023-02-09 ENCOUNTER — E-VISIT (OUTPATIENT)
Dept: FAMILY MEDICINE | Facility: CLINIC | Age: 33
End: 2023-02-09
Payer: MEDICAID

## 2023-02-09 ENCOUNTER — PATIENT MESSAGE (OUTPATIENT)
Dept: FAMILY MEDICINE | Facility: CLINIC | Age: 33
End: 2023-02-09

## 2023-02-09 DIAGNOSIS — R21 RASH AND NONSPECIFIC SKIN ERUPTION: Primary | ICD-10-CM

## 2023-02-09 PROCEDURE — 99499 NO LOS: ICD-10-PCS | Mod: S$PBB,,, | Performed by: FAMILY MEDICINE

## 2023-02-09 PROCEDURE — 99499 UNLISTED E&M SERVICE: CPT | Mod: S$PBB,,, | Performed by: FAMILY MEDICINE

## 2023-02-09 RX ORDER — BUTALBITAL, ACETAMINOPHEN AND CAFFEINE 50; 325; 40 MG/1; MG/1; MG/1
1 TABLET ORAL DAILY
Qty: 30 TABLET | Refills: 0 | OUTPATIENT
Start: 2023-02-09

## 2023-02-09 RX ORDER — IBUPROFEN 200 MG
1 TABLET ORAL DAILY
Qty: 28 PATCH | Refills: 0 | Status: SHIPPED | OUTPATIENT
Start: 2023-02-09 | End: 2023-12-30 | Stop reason: SDUPTHER

## 2023-02-09 NOTE — PATIENT INSTRUCTIONS
Follow up if symptoms worsen or fail to improve.     Dear patient,   As a result of recent federal legislation (The Federal Cures Act), you may receive lab or pathology results from your visit in your [facility] account before your physician is able to contact you. Your physician or their representative will relay the results to you with their recommendations at their soonest availability.     If no improvement in symptoms or symptoms worsen, please be advised to call MD, follow-up at clinic and/or go to ER if becomes severe.    Paul Larson M.D.        We Offer TELEHEALTH & Same Day Appointments!   Book your Telehealth appointment with me through my nurse or   Clinic appointments on [employer]!    [address] [place] [address]    Office: [phone]   FAX: [phone]    Check out my Facebook Page and Follow Me at: [email or web address]    Check out my website at [employer] by clicking on: [email or web address]    To Schedule appointments online, go to [email or web address]: [email or web address]

## 2023-02-09 NOTE — TELEPHONE ENCOUNTER
Set up office visit with an available provider for further evaluation and treatment and further history.

## 2023-02-09 NOTE — TELEPHONE ENCOUNTER
----- Message from Paul Larson MD sent at 2/9/2023 10:47 AM CST -----  Visit not appropriate for E visit.  Patient needs appointment with available provider for evaluation I have placed a referral to Dermatology please help with setting this up

## 2023-02-09 NOTE — PROGRESS NOTES
Dear Sara,     In reviewing your history of the current problem, I feel that it would be best if we address it by seeing you in person in the clinic..  I will forward to my staff to cancel this E-Visit and have the other arranged.       I have signed for the following orders AND/OR meds.  Please call the patient and ask the patient to schedule the testing AND/OR inform about any medications that were sent.      Orders Placed This Encounter   Procedures    Ambulatory referral/consult to Dermatology     Standing Status:   Future     Standing Expiration Date:   3/9/2024     Referral Priority:   Routine     Referral Type:   Consultation     Referral Reason:   Specialty Services Required     Requested Specialty:   Dermatology     Number of Visits Requested:   1

## 2023-02-09 NOTE — Clinical Note
Visit not appropriate for E visit.  Patient needs appointment with available provider for evaluation I have placed a referral to Dermatology please help with setting this up

## 2023-02-16 DIAGNOSIS — Z79.899 ENCOUNTER FOR LONG-TERM (CURRENT) USE OF MEDICATIONS: ICD-10-CM

## 2023-02-16 RX ORDER — BUTALBITAL, ACETAMINOPHEN AND CAFFEINE 50; 325; 40 MG/1; MG/1; MG/1
1 TABLET ORAL DAILY
Qty: 30 TABLET | Refills: 0 | OUTPATIENT
Start: 2023-02-16

## 2023-02-17 ENCOUNTER — PATIENT MESSAGE (OUTPATIENT)
Dept: OBSTETRICS AND GYNECOLOGY | Facility: CLINIC | Age: 33
End: 2023-02-17
Payer: MEDICAID

## 2023-02-27 ENCOUNTER — TELEPHONE (OUTPATIENT)
Dept: FAMILY MEDICINE | Facility: CLINIC | Age: 33
End: 2023-02-27
Payer: MEDICAID
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
## 2023-02-28 ENCOUNTER — PATIENT MESSAGE (OUTPATIENT)
Dept: OBSTETRICS AND GYNECOLOGY | Facility: CLINIC | Age: 33
End: 2023-02-28
Payer: MEDICAID

## 2023-02-28 ENCOUNTER — PATIENT MESSAGE (OUTPATIENT)
Dept: FAMILY MEDICINE | Facility: CLINIC | Age: 33
End: 2023-02-28
Payer: MEDICAID

## 2023-02-28 DIAGNOSIS — R76.8 HEPATITIS C ANTIBODY TEST POSITIVE: ICD-10-CM

## 2023-02-28 DIAGNOSIS — Z11.3 SCREEN FOR STD (SEXUALLY TRANSMITTED DISEASE): Primary | ICD-10-CM

## 2023-02-28 DIAGNOSIS — R76.8 HEPATITIS B ANTIBODY POSITIVE: Primary | ICD-10-CM

## 2023-02-28 NOTE — TELEPHONE ENCOUNTER
Yes, her recent hepatitis B and C test in the ER is abnormal. I recommend further evaluation by hepatology. Please assist with scheduling.    Orders Placed This Encounter   Procedures    Ambulatory referral/consult to Hepatology     Standing Status:   Future     Standing Expiration Date:   3/28/2024     Referral Priority:   Routine     Referral Type:   Consultation     Referral Reason:   Specialty Services Required     Requested Specialty:   Hepatology     Number of Visits Requested:   1

## 2023-03-01 ENCOUNTER — OFFICE VISIT (OUTPATIENT)
Dept: FAMILY MEDICINE | Facility: CLINIC | Age: 33
End: 2023-03-01
Payer: MEDICAID

## 2023-03-01 ENCOUNTER — TELEPHONE (OUTPATIENT)
Dept: INFECTIOUS DISEASES | Facility: CLINIC | Age: 33
End: 2023-03-01
Payer: MEDICAID

## 2023-03-01 VITALS
HEIGHT: 64 IN | HEART RATE: 108 BPM | WEIGHT: 139 LBS | SYSTOLIC BLOOD PRESSURE: 115 MMHG | BODY MASS INDEX: 23.73 KG/M2 | DIASTOLIC BLOOD PRESSURE: 66 MMHG | OXYGEN SATURATION: 97 % | TEMPERATURE: 98 F

## 2023-03-01 DIAGNOSIS — F41.9 ANXIETY: ICD-10-CM

## 2023-03-01 DIAGNOSIS — R76.8 HEPATITIS C ANTIBODY TEST POSITIVE: ICD-10-CM

## 2023-03-01 DIAGNOSIS — B19.10 HEPATITIS B INFECTION WITHOUT DELTA AGENT WITHOUT HEPATIC COMA, UNSPECIFIED CHRONICITY: ICD-10-CM

## 2023-03-01 DIAGNOSIS — Z09 HOSPITAL DISCHARGE FOLLOW-UP: Primary | ICD-10-CM

## 2023-03-01 PROCEDURE — 99999 PR PBB SHADOW E&M-EST. PATIENT-LVL V: CPT | Mod: PBBFAC,,, | Performed by: FAMILY MEDICINE

## 2023-03-01 PROCEDURE — 3074F PR MOST RECENT SYSTOLIC BLOOD PRESSURE < 130 MM HG: ICD-10-PCS | Mod: CPTII,,, | Performed by: FAMILY MEDICINE

## 2023-03-01 PROCEDURE — 3074F SYST BP LT 130 MM HG: CPT | Mod: CPTII,,, | Performed by: FAMILY MEDICINE

## 2023-03-01 PROCEDURE — 99215 OFFICE O/P EST HI 40 MIN: CPT | Mod: PBBFAC,PO | Performed by: FAMILY MEDICINE

## 2023-03-01 PROCEDURE — 1160F PR REVIEW ALL MEDS BY PRESCRIBER/CLIN PHARMACIST DOCUMENTED: ICD-10-PCS | Mod: CPTII,,, | Performed by: FAMILY MEDICINE

## 2023-03-01 PROCEDURE — 99999 PR PBB SHADOW E&M-EST. PATIENT-LVL V: ICD-10-PCS | Mod: PBBFAC,,, | Performed by: FAMILY MEDICINE

## 2023-03-01 PROCEDURE — 1159F MED LIST DOCD IN RCRD: CPT | Mod: CPTII,,, | Performed by: FAMILY MEDICINE

## 2023-03-01 PROCEDURE — 3008F PR BODY MASS INDEX (BMI) DOCUMENTED: ICD-10-PCS | Mod: CPTII,,, | Performed by: FAMILY MEDICINE

## 2023-03-01 PROCEDURE — 99215 PR OFFICE/OUTPT VISIT, EST, LEVL V, 40-54 MIN: ICD-10-PCS | Mod: S$PBB,,, | Performed by: FAMILY MEDICINE

## 2023-03-01 PROCEDURE — 3078F PR MOST RECENT DIASTOLIC BLOOD PRESSURE < 80 MM HG: ICD-10-PCS | Mod: CPTII,,, | Performed by: FAMILY MEDICINE

## 2023-03-01 PROCEDURE — 3078F DIAST BP <80 MM HG: CPT | Mod: CPTII,,, | Performed by: FAMILY MEDICINE

## 2023-03-01 PROCEDURE — 1160F RVW MEDS BY RX/DR IN RCRD: CPT | Mod: CPTII,,, | Performed by: FAMILY MEDICINE

## 2023-03-01 PROCEDURE — 3008F BODY MASS INDEX DOCD: CPT | Mod: CPTII,,, | Performed by: FAMILY MEDICINE

## 2023-03-01 PROCEDURE — 1159F PR MEDICATION LIST DOCUMENTED IN MEDICAL RECORD: ICD-10-PCS | Mod: CPTII,,, | Performed by: FAMILY MEDICINE

## 2023-03-01 PROCEDURE — 99215 OFFICE O/P EST HI 40 MIN: CPT | Mod: S$PBB,,, | Performed by: FAMILY MEDICINE

## 2023-03-01 RX ORDER — EMTRICITABINE AND TENOFOVIR DISOPROXIL FUMARATE 200; 300 MG/1; MG/1
1 TABLET, FILM COATED ORAL DAILY
COMMUNITY
Start: 2023-02-27 | End: 2023-03-29

## 2023-03-01 RX ORDER — RALTEGRAVIR 400 MG/1
400 TABLET, FILM COATED ORAL 2 TIMES DAILY
COMMUNITY
Start: 2023-02-27 | End: 2023-03-29

## 2023-03-01 RX ORDER — BUSPIRONE HYDROCHLORIDE 30 MG/1
30 TABLET ORAL 2 TIMES DAILY
Qty: 60 TABLET | Refills: 1 | Status: SHIPPED | OUTPATIENT
Start: 2023-03-01 | End: 2023-04-23 | Stop reason: SDUPTHER
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
## 2023-03-01 NOTE — TELEPHONE ENCOUNTER
Good afternoon,     Currently we do not have any available appointments here at the [place] location in [place]. Also if for Hep C, pt should be referred to Hepatology.

## 2023-03-01 NOTE — PATIENT INSTRUCTIONS
Follow up in about 4 weeks (around 3/29/2023), or if symptoms worsen or fail to improve, for results.     Dear patient,   As a result of recent federal legislation (The Federal Cures Act), you may receive lab or pathology results from your visit in your [facility] account before your physician is able to contact you. Your physician or their representative will relay the results to you with their recommendations at their soonest availability.     If no improvement in symptoms or symptoms worsen, please be advised to call MD, follow-up at clinic and/or go to ER if becomes severe.    Paul Larson M.D.        We Offer TELEHEALTH & Same Day Appointments!   Book your Telehealth appointment with me through my nurse or   Clinic appointments on "[employer]"!    [address] [place] [address]    Office: [phone]   FAX: [phone]    Check out my Facebook Page and Follow Me at: [email or web address]    Check out my website at [email or web address] by clicking on: [email or web address]    To Schedule appointments online, go to "[employer]": [email or web address]

## 2023-03-01 NOTE — ASSESSMENT & PLAN NOTE
Increase BuSpar to 30 milligrams twice daily.  Follow-up with Psychiatry.Please be advised of condition course.  SNRI/SSRI is first-line treatment for this condition.  Please be advised of the risk of discontinuing this medication without tapering/contacting MD.  Patient has been advised of side effects, and all questions were answered.  Patient voiced understanding.  Patient will follow up routinely and notify us if having any side effects or worsening or persistent symptoms.  ER precautions were given. Antidepressant/Antianxiety Medication Initiation:  Patient informed of risks, benefits, and potential side effects of medication and accepts informed consent.  Common side effects include nausea, fatigue, headache, insomnia, etc see medication insert for complete side effect profile.  Most importantly be advised of the possibility of new or worsening suicidal thoughts/depression/anxiety etcetera.  Please be advised to stop the medication immediately and seek urgent treatment if this occurs.  Therefore please do not to abruptly discontinue medication without physician guidance except in cases of sudden onset or worsening of SI.

## 2023-03-01 NOTE — ASSESSMENT & PLAN NOTE
Checking hepatitis-C viral load.  Referral to infectious disease and hepatology for assistance with repeat treatment if needed.

## 2023-03-01 NOTE — PROGRESS NOTES
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 Disease     Standing Status:   Future     Standing Expiration Date:   4/1/2024     Referral Priority:   Routine     Referral Type:   Consultation     Referral Reason:   Specialty Services Required     Requested Specialty:   Infectious Diseases     Number of Visits Requested:   1     Medications Ordered This Encounter   Medications    busPIRone (BUSPAR) 30 MG Tab     Sig: Take 1 tablet (30 mg total) by mouth 2 (two) times daily.     Dispense:  60 tablet     Refill:  1        Follow up in about 4 weeks (around 3/29/2023), or if symptoms worsen or fail to improve, for results.  Future Appointments       Date Provider Specialty Appt Notes    3/6/2023 Kristian Landaverde MD Obstetrics and Gynecology discuss labs    3/22/2023 Kristian Landaverde MD Obstetrics and Gynecology Discuss tubal reversal          If no improvement in symptoms or symptoms worsen, advised to call/follow-up at clinic or go to ER. Patient voiced understanding and all questions/concerns were addressed.   DISCLAIMER: This note was compiled by using a speech recognition dictation system and therefore please be aware that typographical / speech recognition errors can and do occur.  Please contact me if you see any errors specifically.    Paul Larson M.D.       Office: [phone]   [address] [facility]  [place] [address]  FAX: [phone]

## 2023-03-01 NOTE — ASSESSMENT & PLAN NOTE
Reviewed lab and workup from recent ER visit.  Patient does have follow-up appointment with OBGYN.  She is concerned about the hepatitis test that were performed at the ER.  Performing confirmatory testing and referral to infectious disease/hepatology today.

## 2023-03-03 ENCOUNTER — PATIENT MESSAGE (OUTPATIENT)
Dept: FAMILY MEDICINE | Facility: CLINIC | Age: 33
End: 2023-03-03
Payer: MEDICAID

## 2023-03-03 ENCOUNTER — TELEPHONE (OUTPATIENT)
Dept: HEPATOLOGY | Facility: CLINIC | Age: 33
End: 2023-03-03
Payer: MEDICAID

## 2023-03-03 DIAGNOSIS — J42 CHRONIC BRONCHITIS, UNSPECIFIED CHRONIC BRONCHITIS TYPE: Primary | ICD-10-CM

## 2023-03-03 NOTE — TELEPHONE ENCOUNTER
I recently saw this patient but she did not mention anything about pulmonary issues.  Did you see her for this?

## 2023-03-03 NOTE — TELEPHONE ENCOUNTER
Sendy Mcrae NP ordered that patient be scheduled for a hepatology consult visit.  Visit with hepatology should be scheduled after patient completes labs ordered by the [facility] on 3/1/23.  Attempt made to reach patient. LVM asking that she call hepatology back for scheduling once labs complete.

## 2023-03-06 ENCOUNTER — PATIENT MESSAGE (OUTPATIENT)
Dept: HEPATOLOGY | Facility: CLINIC | Age: 33
End: 2023-03-06
Payer: MEDICAID

## 2023-03-07 ENCOUNTER — PATIENT MESSAGE (OUTPATIENT)
Dept: FAMILY MEDICINE | Facility: CLINIC | Age: 33
End: 2023-03-07
Payer: MEDICAID

## 2023-03-09 RX ORDER — ALBUTEROL SULFATE 2.5 MG/.5ML
2.5 SOLUTION RESPIRATORY (INHALATION) EVERY 6 HOURS PRN
Qty: 1 EACH | Refills: 2 | Status: SHIPPED | OUTPATIENT
Start: 2023-03-09 | End: 2023-12-30 | Stop reason: SDUPTHER

## 2023-03-09 NOTE — TELEPHONE ENCOUNTER
No new care gaps identified.  [facility] Embedded Care Gaps. Reference number: [id number]. 3/09/2023   11:35:54 AM CST

## 2023-03-16 ENCOUNTER — LAB VISIT (OUTPATIENT)
Dept: LAB | Facility: HOSPITAL | Age: 33
End: 2023-03-16
Attending: FAMILY MEDICINE
Payer: MEDICAID

## 2023-03-16 DIAGNOSIS — Z09 HOSPITAL DISCHARGE FOLLOW-UP: ICD-10-CM

## 2023-03-16 DIAGNOSIS — Z11.3 SCREEN FOR STD (SEXUALLY TRANSMITTED DISEASE): ICD-10-CM

## 2023-03-16 DIAGNOSIS — Z13.6 ENCOUNTER FOR LIPID SCREENING FOR CARDIOVASCULAR DISEASE: ICD-10-CM

## 2023-03-16 DIAGNOSIS — Z79.899 ENCOUNTER FOR LONG-TERM (CURRENT) USE OF MEDICATIONS: ICD-10-CM

## 2023-03-16 DIAGNOSIS — Z13.220 ENCOUNTER FOR LIPID SCREENING FOR CARDIOVASCULAR DISEASE: ICD-10-CM

## 2023-03-16 DIAGNOSIS — B19.10 HEPATITIS B INFECTION WITHOUT DELTA AGENT WITHOUT HEPATIC COMA, UNSPECIFIED CHRONICITY: ICD-10-CM

## 2023-03-16 DIAGNOSIS — R76.8 HEPATITIS C ANTIBODY TEST POSITIVE: ICD-10-CM

## 2023-03-16 LAB
INR PPP: 1 (ref 0.8–1.2)
PROTHROMBIN TIME: 10.1 SEC (ref 9–12.5)

## 2023-03-16 PROCEDURE — 86694 HERPES SIMPLEX NES ANTBDY: CPT | Performed by: SPECIALIST

## 2023-03-16 PROCEDURE — 36415 COLL VENOUS BLD VENIPUNCTURE: CPT | Mod: PO | Performed by: SPECIALIST

## 2023-03-16 PROCEDURE — 80074 ACUTE HEPATITIS PANEL: CPT | Performed by: SPECIALIST

## 2023-03-16 PROCEDURE — 86706 HEP B SURFACE ANTIBODY: CPT | Mod: 91 | Performed by: FAMILY MEDICINE

## 2023-03-16 PROCEDURE — 81596 NFCT DS CHRNC HCV 6 ASSAYS: CPT | Performed by: FAMILY MEDICINE

## 2023-03-16 PROCEDURE — 86695 HERPES SIMPLEX TYPE 1 TEST: CPT | Performed by: SPECIALIST

## 2023-03-16 PROCEDURE — 87522 HEPATITIS C REVRS TRNSCRPJ: CPT | Performed by: FAMILY MEDICINE

## 2023-03-16 PROCEDURE — 86592 SYPHILIS TEST NON-TREP QUAL: CPT | Performed by: SPECIALIST

## 2023-03-16 PROCEDURE — 80053 COMPREHEN METABOLIC PANEL: CPT | Performed by: FAMILY MEDICINE

## 2023-03-16 PROCEDURE — 87516 HEPATITIS B DNA AMP PROBE: CPT | Performed by: FAMILY MEDICINE

## 2023-03-16 PROCEDURE — 85610 PROTHROMBIN TIME: CPT | Performed by: FAMILY MEDICINE

## 2023-03-16 PROCEDURE — 80307 DRUG TEST PRSMV CHEM ANLYZR: CPT | Performed by: FAMILY MEDICINE

## 2023-03-16 PROCEDURE — 86790 VIRUS ANTIBODY NOS: CPT | Performed by: FAMILY MEDICINE

## 2023-03-16 PROCEDURE — 80061 LIPID PANEL: CPT | Performed by: FAMILY MEDICINE

## 2023-03-16 PROCEDURE — 82105 ALPHA-FETOPROTEIN SERUM: CPT | Performed by: FAMILY MEDICINE

## 2023-03-16 PROCEDURE — 87389 HIV-1 AG W/HIV-1&-2 AB AG IA: CPT | Performed by: SPECIALIST

## 2023-03-17 ENCOUNTER — PATIENT MESSAGE (OUTPATIENT)
Dept: FAMILY MEDICINE | Facility: CLINIC | Age: 33
End: 2023-03-17
Payer: MEDICAID

## 2023-03-17 LAB
AFP SERPL-MCNC: <2 NG/ML (ref 0–8.4)
ALBUMIN SERPL BCP-MCNC: 3.9 G/DL (ref 3.5–5.2)
ALP SERPL-CCNC: 95 U/L (ref 55–135)
ALT SERPL W/O P-5'-P-CCNC: 17 U/L (ref 10–44)
AMPHET+METHAMPHET UR QL: NEGATIVE
ANION GAP SERPL CALC-SCNC: 8 MMOL/L (ref 8–16)
AST SERPL-CCNC: 20 U/L (ref 10–40)
BARBITURATES UR QL SCN>200 NG/ML: ABNORMAL
BENZODIAZ UR QL SCN>200 NG/ML: NEGATIVE
BILIRUB SERPL-MCNC: 0.2 MG/DL (ref 0.1–1)
BUN SERPL-MCNC: 9 MG/DL (ref 6–20)
BZE UR QL SCN: NEGATIVE
CALCIUM SERPL-MCNC: 9 MG/DL (ref 8.7–10.5)
CANNABINOIDS UR QL SCN: ABNORMAL
CHLORIDE SERPL-SCNC: 106 MMOL/L (ref 95–110)
CHOLEST SERPL-MCNC: 128 MG/DL (ref 120–199)
CHOLEST/HDLC SERPL: 3.3 {RATIO} (ref 2–5)
CO2 SERPL-SCNC: 24 MMOL/L (ref 23–29)
CREAT SERPL-MCNC: 0.7 MG/DL (ref 0.5–1.4)
CREAT UR-MCNC: 101 MG/DL (ref 15–325)
EST. GFR  (NO RACE VARIABLE): >60 ML/MIN/1.73 M^2
ETHANOL UR-MCNC: <10 MG/DL
GLUCOSE SERPL-MCNC: 71 MG/DL (ref 70–110)
HAV IGG SER QL IA: NORMAL
HAV IGM SERPL QL IA: ABNORMAL
HAV IGM SERPL QL IA: NORMAL
HBV CORE IGM SERPL QL IA: ABNORMAL
HBV SURFACE AB SER-ACNC: 8.97 MIU/ML
HBV SURFACE AB SER-ACNC: NORMAL M[IU]/ML
HBV SURFACE AG SERPL QL IA: ABNORMAL
HBV SURFACE AG SERPL QL IA: NORMAL
HCV AB SERPL QL IA: REACTIVE
HDLC SERPL-MCNC: 39 MG/DL (ref 40–75)
HDLC SERPL: 30.5 % (ref 20–50)
HIV 1+2 AB+HIV1 P24 AG SERPL QL IA: NORMAL
HSV1 IGG SERPL QL IA: NEGATIVE
HSV2 IGG SERPL QL IA: NEGATIVE
LDLC SERPL CALC-MCNC: 60 MG/DL (ref 63–159)
METHADONE UR QL SCN>300 NG/ML: NEGATIVE
NONHDLC SERPL-MCNC: 89 MG/DL
OPIATES UR QL SCN: NEGATIVE
PCP UR QL SCN>25 NG/ML: NEGATIVE
POTASSIUM SERPL-SCNC: 4 MMOL/L (ref 3.5–5.1)
PROT SERPL-MCNC: 6.8 G/DL (ref 6–8.4)
RPR SER QL: NORMAL
SODIUM SERPL-SCNC: 138 MMOL/L (ref 136–145)
TOXICOLOGY INFORMATION: ABNORMAL
TRIGL SERPL-MCNC: 145 MG/DL (ref 30–150)

## 2023-03-20 ENCOUNTER — TELEPHONE (OUTPATIENT)
Dept: NEUROLOGY | Facility: CLINIC | Age: 33
End: 2023-03-20
Payer: MEDICAID

## 2023-03-20 ENCOUNTER — PATIENT MESSAGE (OUTPATIENT)
Dept: OBSTETRICS AND GYNECOLOGY | Facility: CLINIC | Age: 33
End: 2023-03-20
Payer: MEDICAID

## 2023-03-20 DIAGNOSIS — B18.2 HEPATITIS C VIRUS CARRIER STATE: Primary | ICD-10-CM

## 2023-03-20 LAB
HBV DNA SERPL QL NAA+PROBE: NOT DETECTED
HCV GENTYP SERPL NAA+PROBE: NORMAL
HCV RNA SERPL NAA+PROBE-LOG IU: <1.08 LOG (10) IU/ML
HCV RNA SERPL QL NAA+PROBE: NOT DETECTED
HCV RNA SERPL QL NAA+PROBE: NOT DETECTED
HCV RNA SPEC NAA+PROBE-ACNC: <12 IU/ML
HCV RNA SPEC NAA+PROBE-ACNC: NOT DETECTED IU/ML
HSV AB, IGM BY EIA: 0.69 INDEX

## 2023-03-20 NOTE — TELEPHONE ENCOUNTER
I will send a result note once all of the results have returned.  Patient was referred to Infectious Disease also she needs to make an appointment with them.

## 2023-03-20 NOTE — TELEPHONE ENCOUNTER
"Received message from the patient:  "I believe its the Keppra thats been causing the unknown rashes that I keep getting I also see it interferes with 5 other medications I take. So is there anyway I can get put back on my phenobarbital its the only other med that worked on me and also didnt cause any reactions. I only asked my dr to change me from it because he had me on so many other controlled drugs at that time and I didnt think I needed that too and I dont know for certain but they figure out why Im having these rashes and it only started when I am consistently taken the Keppra and now with 5 meds it interacts with I believe its making it worse. He had me on the 97.4 I think it was of phenobarbital and it actually helped my migraines too"        Please let her know that phenobarbital is not safe for her to take with her other medications. It is a sedative that interacts with her other medications she is on. It is a dangerous medication that I do not recommend anymore due to the side effects (short term and long term) and there are many other safer seizure medication options. Keppra on the other hand does not interact with the medications. But if she feels she is having a rash with keppra we could try to change to Briviact.   "

## 2023-03-21 ENCOUNTER — PATIENT MESSAGE (OUTPATIENT)
Dept: NEUROLOGY | Facility: CLINIC | Age: 33
End: 2023-03-21
Payer: MEDICAID

## 2023-03-21 LAB
A2 MACROGLOB SERPL-MCNC: 212 MG/DL (ref 100–280)
ALT SERPL W P-5'-P-CCNC: 18 U/L (ref 7–45)
APO A-I SERPL-MCNC: 116 MG/DL
BILIRUB SERPL-MCNC: <0.2 MG/DL
BIOPREDICTIVE SERIAL NUMBER: ABNORMAL
FIBROSIS STAGE SERPL QL: ABNORMAL
FIBROTEST INTERPRETATION: ABNORMAL
FIBROTEST-ACTITEST COMMENT: ABNORMAL
GGT SERPL-CCNC: 38 U/L (ref 5–36)
HAPTOGLOB SERPL-MCNC: 43 MG/DL (ref 30–200)
LIVER FIBR SCORE SERPL CALC.FIBROSURE: 0.18
NECROINFLAMMAT INTERP: ABNORMAL
NECROINFLAMMATORY ACT GRADE SERPL QL: ABNORMAL
NECROINFLAMMATORY ACT SCORE SERPL: 0.06

## 2023-03-21 NOTE — TELEPHONE ENCOUNTER
Called patient to discuss keppra and phenobarbital. NO answer. Left message to return call. Also sending portal message

## 2023-03-24 NOTE — PROGRESS NOTES
1st check to see if patient has seen the results.  If not then  CALL patient with results and Document verification.  Schedule follow-up if needed.  [phone]  HCV FibroSURE test shows no fibrosis which is reassuring.  Hepatitis-C virus is not detectable at this time which means you do not have active hepatitis-C.  Hepatitis-B is also not detected.    Follow-up with all specialists.  Follow-up with me sooner if having any further issues.

## 2023-03-27 ENCOUNTER — TELEPHONE (OUTPATIENT)
Dept: NEUROLOGY | Facility: CLINIC | Age: 33
End: 2023-03-27
Payer: MEDICAID

## 2023-03-27 NOTE — TELEPHONE ENCOUNTER
Spoke to the pt, appt scheduled on 4/28/23 at 1000 with Truamn Rocha. Referred per Dr. Julieta Lazcano. Date, time and location discussed.

## 2023-03-30 ENCOUNTER — PATIENT MESSAGE (OUTPATIENT)
Dept: FAMILY MEDICINE | Facility: CLINIC | Age: 33
End: 2023-03-30

## 2023-03-30 ENCOUNTER — PATIENT MESSAGE (OUTPATIENT)
Dept: NEUROLOGY | Facility: CLINIC | Age: 33
End: 2023-03-30
Payer: MEDICAID

## 2023-03-30 ENCOUNTER — E-VISIT (OUTPATIENT)
Dept: FAMILY MEDICINE | Facility: CLINIC | Age: 33
End: 2023-03-30
Payer: MEDICAID

## 2023-03-30 ENCOUNTER — PATIENT MESSAGE (OUTPATIENT)
Dept: HEPATOLOGY | Facility: CLINIC | Age: 33
End: 2023-03-30
Payer: MEDICAID

## 2023-03-30 ENCOUNTER — PATIENT MESSAGE (OUTPATIENT)
Dept: FAMILY MEDICINE | Facility: CLINIC | Age: 33
End: 2023-03-30
Payer: MEDICAID

## 2023-03-30 DIAGNOSIS — L27.0 DRUG ERUPTION: Primary | ICD-10-CM

## 2023-03-30 DIAGNOSIS — Z79.899 ENCOUNTER FOR LONG-TERM (CURRENT) USE OF MEDICATIONS: ICD-10-CM

## 2023-03-30 PROCEDURE — 99422 PR E&M, ONLINE DIGIT, EST, < 7 DAYS,  11-20 MINS: ICD-10-PCS | Mod: ,,, | Performed by: FAMILY MEDICINE

## 2023-03-30 PROCEDURE — 99422 OL DIG E/M SVC 11-20 MIN: CPT | Mod: ,,, | Performed by: FAMILY MEDICINE

## 2023-03-30 RX ORDER — FAMOTIDINE 40 MG/1
40 TABLET, FILM COATED ORAL DAILY
Qty: 30 TABLET | Refills: 1 | Status: SHIPPED | OUTPATIENT
Start: 2023-03-30 | End: 2023-06-01

## 2023-03-30 NOTE — TELEPHONE ENCOUNTER
We can try Briviact. Will start 100 mg twice daily. If she is able to get this filled, she can stop keppra and replace it with Briviact.

## 2023-03-30 NOTE — PROGRESS NOTES
The patient location is: Home  The chief complaint leading to consultation is:Rash  Visit type: E visit    Total time spent with patient: 15 minutes  Each patient to whom he or she provides medical services by telemedicine is:  (1) informed of the relationship between the physician and patient and the respective role of any other health care provider with respect to management of the patient; and (2) notified that he or she may decline to receive medical services by telemedicine and may withdraw from such care at any time.    Notes:   Sara Sanford presents with moderate severe rash predominately on limbs for past several weeks. Lesions are red excoriated and weeping/bleeding  Has been tx w permethrin in past. Possible onset after starting lamictal,seroquel or sertraline-has stopped lamictal. No similar rashes in contacts.   No dyspnea Denies nausea,vomiting,diarrhea or significant fever.    Past Medical History:   Diagnosis Date    Abnormal Pap smear     ALLERGIC RHINITIS     Anemia     Anxiety     Bipolar 1 disorder     Chronic back pain     Drug-seeking behavior 4/16/2012    Epilepsy     GERD (gastroesophageal reflux disease)     GERD (gastroesophageal reflux disease)     Hepatitis C 2018    Medications given     Opioid dependence 4/16/2012    Pre-eclampsia     Seizures     last episode 2020, EEG-normal;     Urinary tract infection      Past Surgical History:   Procedure Laterality Date    CHOLECYSTECTOMY      LAPAROSCOPIC LIGATION OF FALLOPIAN TUBE Bilateral 05/23/2022    Procedure: LIGATION, FALLOPIAN TUBE, LAPAROSCOPIC;  Surgeon: Kristian Landaverde MD;  Location: [facility];  Service: OB/GYN;  Laterality: Bilateral;    mirena removal  02/2018    PELVIC LAPAROSCOPY      iud retrieval     TUBAL LIGATION      VAGINAL DELIVERY      times 4    vestibulectomy      WISDOM TOOTH EXTRACTION       Review of patient's allergies indicates:   Allergen Reactions    Darvocet a500 [propoxyphene n-acetaminophen]     Phenytoin  sodium extended Other (See Comments)     Current Outpatient Medications on File Prior to Visit   Medication Sig Dispense Refill    albuterol (PROVENTIL HFA) 90 mcg/actuation inhaler Inhale 2 puffs into the lungs every 6 (six) hours as needed for Wheezing. Rescue 18 g 1    albuterol sulfate 2.5 mg/0.5 mL Nebu Take 2.5 mg by nebulization every 6 (six) hours as needed (shortness of breath). Rescue 1 each 2    atomoxetine (STRATTERA) 60 MG capsule Take 60 mg by mouth every morning.      buprenorphine (SUBLOCADE) 100 mg/0.5 mL injection       busPIRone (BUSPAR) 30 MG Tab Take 1 tablet (30 mg total) by mouth 2 (two) times daily. 60 tablet 1    butalbital-acetaminophen-caffeine -40 mg (FIORICET, ESGIC) -40 mg per tablet Take 1 tablet by mouth once daily. 30 tablet 0    DULoxetine (CYMBALTA) 30 MG capsule Take 30 mg by mouth.      emtricitabine-tenofovir 200-300 mg (TRUVADA) 200-300 mg Tab Take 1 tablet by mouth once daily.      gabapentin (NEURONTIN) 800 MG tablet Take 1 tablet (800 mg total) by mouth 3 (three) times daily. 90 tablet 0    hydrOXYzine pamoate (VISTARIL) 50 MG Cap Take 50 mg by mouth daily as needed.      levETIRAcetam (KEPPRA) 750 MG Tab Take 2 tablets (1,500 mg total) by mouth 2 (two) times daily. 360 tablet 3    methylphenidate HCl (RITALIN) 10 MG tablet Take 10 mg by mouth 2 (two) times a day.      mupirocin (BACTROBAN) 2 % ointment 2 (two) times daily. apply to affected area      nicotine (NICODERM CQ) 21 mg/24 hr Place 1 patch onto the skin once daily. (Patient not taking: Reported on 3/1/2023) 28 patch 0    ondansetron (ZOFRAN-ODT) 8 MG TbDL Take 1 tablet (8 mg total) by mouth every 6 (six) hours as needed (nausea). 30 tablet 1    pantoprazole (PROTONIX) 40 MG tablet Take 1 tablet (40 mg total) by mouth once daily. (Patient not taking: Reported on 3/1/2023) 90 tablet 1    permethrin (ELIMITE) 5 % cream Apply topically.      QUEtiapine (SEROQUEL) 300 MG Tab Take 300 mg by mouth every  evening.      raltegravir (ISENTRESS) 400 mg tablet Take 400 mg by mouth 2 (two) times daily.      VITAFOL GUMMIES 3.33 mg iron- 0.33 mg Chew Take 2 tablets by mouth once daily. 180 tablet 4    [DISCONTINUED] levomefolate calcium (ELFOLATE) 15 mg Tab       [DISCONTINUED] ranitidine (ZANTAC) 150 MG tablet Take 1 tablet (150 mg total) by mouth 2 (two) times daily as needed. 60 tablet 11     No current facility-administered medications on file prior to visit.     Social History     Socioeconomic History    Marital status: Single   Tobacco Use    Smoking status: Every Day     Packs/day: 0.50     Years: 10.00     Pack years: 5.00     Types: Cigarettes     Passive exposure: Never    Smokeless tobacco: Never    Tobacco comments:     Trying to quit   Substance and Sexual Activity    Alcohol use: Not Currently     Comment: occasional    Drug use: Not Currently     Types: Hydrocodone     Comment: past histoy last 4/7/2022    Sexual activity: Yes     Partners: Male     Birth control/protection: None     Family History   Problem Relation Age of Onset    Stroke Mother     Depression Mother         Anxiety depression and heart    Heart disease Mother         Heart problems and stroke    Depression Sister         Anxiety depression    Heart disease Maternal Grandmother         Heart issues (murmur)    Early death Sister         Her two daughters (seperate pregnancy stillborn twice)    Breast cancer Neg Hx     Ovarian cancer Neg Hx          ROS:  SKIN: See above  EYES: Visual acuity fine. No photophobia, ocular pain or diplopia.EARS: Denies ear pain, discharge or vertigo.NOSE: No loss of smell, no epistaxis some postnasal drip.MOUTH & THROAT: No hoarseness or change in voice. No excessive gum bleeding.CHEST: Denies MEDINA, cyanosis, wheezing  CARDIOVASCULAR: Denies chest pain, PND, orthopnea or reduced exercise tolerance.  ABDOMEN:  No weight loss.No abdominal pain, no hematemesis or blood in stool.  URINARY: No flank pain, dysuria or  hematuria.  PERIPHERAL VASCULAR: No claudication or cyanosis.  MUSCULOSKELETAL: Negative   NEUROLOGIC: No history of seizures, paralysis, alteration of gait or coordination.    PE: See submitted photos-multiple excoriated erythematous lesions with open sores and scabbing.  Imp: Drug eruption vs scabies vs atopy  Plan: Repeat permethrin  Pepcid 40 qd  If worsens or fails to progress nrecommend Derm consult

## 2023-03-31 RX ORDER — BUTALBITAL, ACETAMINOPHEN AND CAFFEINE 50; 325; 40 MG/1; MG/1; MG/1
1 TABLET ORAL DAILY
Qty: 30 TABLET | Refills: 0 | OUTPATIENT
Start: 2023-03-31

## 2023-04-01 ENCOUNTER — PATIENT MESSAGE (OUTPATIENT)
Dept: NEUROLOGY | Facility: CLINIC | Age: 33
End: 2023-04-01
Payer: MEDICAID

## 2023-04-02 ENCOUNTER — PATIENT MESSAGE (OUTPATIENT)
Dept: NEUROLOGY | Facility: CLINIC | Age: 33
End: 2023-04-02
Payer: MEDICAID

## 2023-04-27 ENCOUNTER — PATIENT MESSAGE (OUTPATIENT)
Dept: NEUROLOGY | Facility: CLINIC | Age: 33
End: 2023-04-27
Payer: MEDICAID

## 2023-05-08 DIAGNOSIS — Z79.899 ENCOUNTER FOR LONG-TERM (CURRENT) USE OF MEDICATIONS: ICD-10-CM

## 2023-05-08 RX ORDER — BUTALBITAL, ACETAMINOPHEN AND CAFFEINE 50; 325; 40 MG/1; MG/1; MG/1
1 TABLET ORAL DAILY
Qty: 30 TABLET | Refills: 0 | OUTPATIENT
Start: 2023-05-08

## 2023-05-27 DIAGNOSIS — J42 CHRONIC BRONCHITIS, UNSPECIFIED CHRONIC BRONCHITIS TYPE: ICD-10-CM

## 2023-05-27 RX ORDER — ALBUTEROL SULFATE 90 UG/1
AEROSOL, METERED RESPIRATORY (INHALATION)
Qty: 18 G | Refills: 1 | Status: SHIPPED | OUTPATIENT
Start: 2023-05-27

## 2023-05-30 DIAGNOSIS — Z79.899 ENCOUNTER FOR LONG-TERM (CURRENT) USE OF MEDICATIONS: ICD-10-CM

## 2023-05-30 RX ORDER — BUTALBITAL, ACETAMINOPHEN AND CAFFEINE 50; 325; 40 MG/1; MG/1; MG/1
1 TABLET ORAL DAILY
Qty: 30 TABLET | Refills: 0 | OUTPATIENT
Start: 2023-05-30

## 2023-06-01 RX ORDER — FAMOTIDINE 40 MG/1
TABLET, FILM COATED ORAL
Qty: 30 TABLET | Refills: 1 | Status: SHIPPED | OUTPATIENT
Start: 2023-06-01 | End: 2024-02-07 | Stop reason: SDUPTHER

## 2023-06-05 PROBLEM — Z09 HOSPITAL DISCHARGE FOLLOW-UP: Status: RESOLVED | Noted: 2023-03-01 | Resolved: 2023-06-05

## 2023-06-15 DIAGNOSIS — Z79.899 ENCOUNTER FOR LONG-TERM (CURRENT) USE OF MEDICATIONS: ICD-10-CM

## 2023-06-15 RX ORDER — BUTALBITAL, ACETAMINOPHEN AND CAFFEINE 50; 325; 40 MG/1; MG/1; MG/1
1 TABLET ORAL DAILY
Qty: 30 TABLET | Refills: 0 | OUTPATIENT
Start: 2023-06-15

## 2023-07-06 ENCOUNTER — PATIENT MESSAGE (OUTPATIENT)
Dept: OBSTETRICS AND GYNECOLOGY | Facility: CLINIC | Age: 33
End: 2023-07-06
Payer: MEDICAID

## 2023-07-07 ENCOUNTER — PATIENT MESSAGE (OUTPATIENT)
Dept: OBSTETRICS AND GYNECOLOGY | Facility: CLINIC | Age: 33
End: 2023-07-07
Payer: MEDICAID

## 2023-07-26 ENCOUNTER — PATIENT MESSAGE (OUTPATIENT)
Dept: OBSTETRICS AND GYNECOLOGY | Facility: CLINIC | Age: 33
End: 2023-07-26
Payer: MEDICAID

## 2023-07-26 ENCOUNTER — PATIENT MESSAGE (OUTPATIENT)
Dept: NEUROLOGY | Facility: CLINIC | Age: 33
End: 2023-07-26
Payer: MEDICAID

## 2023-07-26 DIAGNOSIS — R56.9 SEIZURES: Primary | ICD-10-CM

## 2023-07-31 ENCOUNTER — PATIENT MESSAGE (OUTPATIENT)
Dept: NEUROLOGY | Facility: CLINIC | Age: 33
End: 2023-07-31
Payer: MEDICAID

## 2023-08-10 ENCOUNTER — E-VISIT (OUTPATIENT)
Dept: FAMILY MEDICINE | Facility: CLINIC | Age: 33
End: 2023-08-10
Payer: MEDICAID

## 2023-08-10 DIAGNOSIS — R21 RASH: Primary | ICD-10-CM

## 2023-08-10 PROCEDURE — 99422 OL DIG E/M SVC 11-20 MIN: CPT | Mod: ,,, | Performed by: STUDENT IN AN ORGANIZED HEALTH CARE EDUCATION/TRAINING PROGRAM

## 2023-08-10 PROCEDURE — 99422 PR E&M, ONLINE DIGIT, EST, < 7 DAYS,  11-20 MINS: ICD-10-PCS | Mod: ,,, | Performed by: STUDENT IN AN ORGANIZED HEALTH CARE EDUCATION/TRAINING PROGRAM

## 2023-08-10 RX ORDER — DOXYCYCLINE HYCLATE 100 MG
100 TABLET ORAL 2 TIMES DAILY
Qty: 28 TABLET | Refills: 0 | Status: SHIPPED | OUTPATIENT
Start: 2023-08-10 | End: 2023-08-24

## 2023-08-10 NOTE — PROGRESS NOTES
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 confirmatory send out test is advised.     This report is intended for use in clinical monitoring and management   of   patients. It is not intended for use in employment related drug   testing.      HCV Qualitative Result 03/16/2023 Not detected  Not detected Final    HCV Quantitative Result 03/16/2023 <12  <12 IU/mL Final    HCV Quantitative Log 03/16/2023 <1.08  <1.08 Log (10) IU/mL Final    Hepatitis C Virus Genotype 03/16/2023 Canceled   Final    Comment: Genotyping not performed due to low viral load.  The HCV quantitation (viral load) procedure utilizes a   real-time reverse transcriptase polymerase chain reaction  (PCR) test from [employer].  The amplification  target is a conserved region of the HCV genome.  The  lower limit of quantitation is 12 IU/mL (1.08 Log IU/mL)  and the upper limit of quantitation is 100 million IU/mL  8.00 log IU/mL).  The qualitative limit of detection  is 12 IU/mL (1.08 Log IU/mL).    The hepatitis C virus (HCV) genotype was  determined using  reverse transcription and PCR amplification of the 5   untranslated region and the core region of the HCV genome  followed by electrochemical detection (e[employer]ensor XT8).   Specimens with HCV viral loads <625 IU/mL cannot be  genotyped.    These tests should not be used to establish a diagnosis  of HCV infection.      The HCV genotype test uses commercial reagents that have  not been approved or cleared by the FDA.  The FDA has  determined that such clearance or ap                           proval is not  necessary.  The performance characteristics of this  procedure were determined by [facility].     Test performed at [facility],  [address], [place]  [address]     [phone]  Lexi Beltran MD, PhD - Medical Director         Encounter Diagnosis   Name Primary?    Rash Yes        No orders of the defined types were placed in this encounter.     Medications Ordered This Encounter   Medications    doxycycline (VIBRA-TABS) 100 MG tablet     Sig: Take 1 tablet (100 mg total) by mouth 2 (two) times daily. for 14 days     Dispense:  28 tablet     Refill:  0        E-Visit Time Tracking:    Day 1 Time (in minutes): 11     Total Time (in minutes): 11       274}

## 2023-08-16 ENCOUNTER — PATIENT MESSAGE (OUTPATIENT)
Dept: OBSTETRICS AND GYNECOLOGY | Facility: CLINIC | Age: 33
End: 2023-08-16
Payer: MEDICAID

## 2023-08-21 DIAGNOSIS — Z11.3 SCREEN FOR STD (SEXUALLY TRANSMITTED DISEASE): Primary | ICD-10-CM

## 2023-08-24 ENCOUNTER — TELEPHONE (OUTPATIENT)
Dept: OBSTETRICS AND GYNECOLOGY | Facility: CLINIC | Age: 33
End: 2023-08-24
Payer: MEDICAID

## 2023-08-24 NOTE — TELEPHONE ENCOUNTER
----- Message from Mane Lopez sent at 8/24/2023  9:56 AM CDT -----  Regarding: reschedule appt  Contact: Grandmother  Type:  Reschedule Appointment Request    Caller is requesting to reschedule appointment.      Name of Caller:Pts grandmother Binta  When is the first available appointment?8/24    Symptoms:irregular cycle, std screen    Would the patient rather a call back or a response via [facility]? Call back    Best Call Back Number:[phone]    Additional Information: Sts the car broke down and won't make the appt today.  Wants to try to get in next week Tuesday or wed morning.  Next avail wasn't till the 14th.  Sts would like about 10 am.   Please advise -- Thank you

## 2023-08-31 ENCOUNTER — PATIENT MESSAGE (OUTPATIENT)
Dept: OBSTETRICS AND GYNECOLOGY | Facility: CLINIC | Age: 33
End: 2023-08-31
Payer: MEDICAID

## 2023-12-05 ENCOUNTER — PATIENT MESSAGE (OUTPATIENT)
Dept: FAMILY MEDICINE | Facility: CLINIC | Age: 33
End: 2023-12-05
Payer: MEDICAID

## 2023-12-20 ENCOUNTER — E-VISIT (OUTPATIENT)
Dept: FAMILY MEDICINE | Facility: CLINIC | Age: 33
End: 2023-12-20
Payer: MEDICAID

## 2023-12-20 DIAGNOSIS — N39.0 URINARY TRACT INFECTION WITHOUT HEMATURIA, SITE UNSPECIFIED: Primary | ICD-10-CM

## 2023-12-20 DIAGNOSIS — R51.9 NONINTRACTABLE HEADACHE, UNSPECIFIED CHRONICITY PATTERN, UNSPECIFIED HEADACHE TYPE: ICD-10-CM

## 2023-12-20 DIAGNOSIS — Z79.899 ENCOUNTER FOR LONG-TERM (CURRENT) USE OF MEDICATIONS: ICD-10-CM

## 2023-12-20 DIAGNOSIS — F31.9 BIPOLAR 1 DISORDER: ICD-10-CM

## 2023-12-20 DIAGNOSIS — F41.1 GAD (GENERALIZED ANXIETY DISORDER): ICD-10-CM

## 2023-12-20 PROCEDURE — 99423 PR E&M, ONLINE DIGIT, EST, < 7 DAYS,  21+ MINS: ICD-10-PCS | Mod: ICN,,, | Performed by: STUDENT IN AN ORGANIZED HEALTH CARE EDUCATION/TRAINING PROGRAM

## 2023-12-20 PROCEDURE — 99423 OL DIG E/M SVC 21+ MIN: CPT | Mod: ICN,,, | Performed by: STUDENT IN AN ORGANIZED HEALTH CARE EDUCATION/TRAINING PROGRAM

## 2023-12-30 ENCOUNTER — PATIENT MESSAGE (OUTPATIENT)
Dept: FAMILY MEDICINE | Facility: CLINIC | Age: 33
End: 2023-12-30
Payer: MEDICAID

## 2023-12-30 DIAGNOSIS — F17.200 TOBACCO DEPENDENCE SYNDROME: Chronic | ICD-10-CM

## 2023-12-30 DIAGNOSIS — Z79.899 ENCOUNTER FOR LONG-TERM (CURRENT) USE OF MEDICATIONS: ICD-10-CM

## 2023-12-30 DIAGNOSIS — K21.9 GASTROESOPHAGEAL REFLUX DISEASE WITHOUT ESOPHAGITIS: ICD-10-CM

## 2023-12-30 DIAGNOSIS — G43.709 CHRONIC MIGRAINE WITHOUT AURA WITHOUT STATUS MIGRAINOSUS, NOT INTRACTABLE: ICD-10-CM

## 2023-12-30 RX ORDER — BUTALBITAL, ACETAMINOPHEN AND CAFFEINE 50; 325; 40 MG/1; MG/1; MG/1
1 TABLET ORAL DAILY
Qty: 30 TABLET | Refills: 0 | Status: CANCELLED | OUTPATIENT
Start: 2023-12-30

## 2023-12-30 RX ORDER — NITROFURANTOIN 25; 75 MG/1; MG/1
100 CAPSULE ORAL 2 TIMES DAILY
Qty: 10 CAPSULE | Refills: 0 | Status: SHIPPED | OUTPATIENT
Start: 2023-12-30 | End: 2024-01-04
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
## 2023-12-31 RX ORDER — BUTALBITAL, ACETAMINOPHEN AND CAFFEINE 50; 325; 40 MG/1; MG/1; MG/1
1 TABLET ORAL DAILY
Qty: 30 TABLET | Refills: 0 | Status: SHIPPED | OUTPATIENT
Start: 2023-12-31

## 2023-12-31 NOTE — TELEPHONE ENCOUNTER
Care Due:                  Date            Visit Type   Department     Provider  --------------------------------------------------------------------------------                                EP -                              PRIMARY      [facility]  Last Visit: 03-      CARE ([facility])   MEDICINE       Paul Larson  Next Visit: None Scheduled  None         None Found                                                            Last  Test          Frequency    Reason                     Performed    Due Date  --------------------------------------------------------------------------------    Office Visit  12 months..  busPIRone, nicotine......  03- 02-    [facility] Embedded Care Due Messages. Reference number: [id number].   12/30/2023 10:11:53 PM CST

## 2024-01-02 RX ORDER — ALBUTEROL SULFATE 2.5 MG/.5ML
2.5 SOLUTION RESPIRATORY (INHALATION) EVERY 6 HOURS PRN
Qty: 1 EACH | Refills: 2 | Status: SHIPPED | OUTPATIENT
Start: 2024-01-02 | End: 2025-01-01

## 2024-01-02 RX ORDER — ONDANSETRON 8 MG/1
8 TABLET, ORALLY DISINTEGRATING ORAL EVERY 6 HOURS PRN
Qty: 30 TABLET | Refills: 1 | Status: SHIPPED | OUTPATIENT
Start: 2024-01-02

## 2024-01-02 RX ORDER — PANTOPRAZOLE SODIUM 40 MG/1
40 TABLET, DELAYED RELEASE ORAL DAILY
Qty: 90 TABLET | Refills: 1 | Status: SHIPPED | OUTPATIENT
Start: 2024-01-02

## 2024-01-02 RX ORDER — IBUPROFEN 200 MG
1 TABLET ORAL DAILY
Qty: 28 PATCH | Refills: 0 | Status: SHIPPED | OUTPATIENT
Start: 2024-01-02

## 2024-01-02 RX ORDER — PNV 112/IRON/FOLIC/OM3/DHA/EPA 3.33-.33MG
2 TABLET,CHEWABLE ORAL DAILY
Qty: 180 TABLET | Refills: 4 | Status: SHIPPED | OUTPATIENT
Start: 2024-01-02

## 2024-01-02 NOTE — TELEPHONE ENCOUNTER
Refill Routing Note   Medication(s) are not appropriate for processing by [facility] for the following reason(s):        Outside of protocol  No active prescription written by provider    [facility] action(s):  Defer  Route               Appointments  past 12m or future 3m with PCP    Date Provider   Last Visit   3/1/2023 Paul Larson MD   Next Visit   Visit date not found Paul Larson MD   ED visits in past 90 days: 0        Note composed:9:50 AM 01/02/2024

## 2024-02-07 ENCOUNTER — PATIENT MESSAGE (OUTPATIENT)
Dept: FAMILY MEDICINE | Facility: CLINIC | Age: 34
End: 2024-02-07
Payer: MEDICAID

## 2024-02-07 RX ORDER — FAMOTIDINE 40 MG/1
40 TABLET, FILM COATED ORAL DAILY
Qty: 90 TABLET | Refills: 1 | Status: SHIPPED | OUTPATIENT
Start: 2024-02-07 | End: 2024-04-02 | Stop reason: SDUPTHER

## 2024-02-15 ENCOUNTER — PATIENT MESSAGE (OUTPATIENT)
Dept: OBSTETRICS AND GYNECOLOGY | Facility: CLINIC | Age: 34
End: 2024-02-15
Payer: MEDICAID

## 2024-04-02 DIAGNOSIS — K21.9 GASTROESOPHAGEAL REFLUX DISEASE WITHOUT ESOPHAGITIS: ICD-10-CM

## 2024-04-02 DIAGNOSIS — Z79.899 ENCOUNTER FOR LONG-TERM (CURRENT) USE OF MEDICATIONS: ICD-10-CM

## 2024-04-02 DIAGNOSIS — F41.9 ANXIETY: ICD-10-CM

## 2024-04-02 DIAGNOSIS — R56.9 SEIZURES: Primary | ICD-10-CM

## 2024-04-02 DIAGNOSIS — J42 CHRONIC BRONCHITIS, UNSPECIFIED CHRONIC BRONCHITIS TYPE: ICD-10-CM

## 2024-04-02 DIAGNOSIS — G43.709 CHRONIC MIGRAINE WITHOUT AURA WITHOUT STATUS MIGRAINOSUS, NOT INTRACTABLE: ICD-10-CM

## 2024-04-02 DIAGNOSIS — F17.200 TOBACCO DEPENDENCE SYNDROME: Chronic | ICD-10-CM

## 2024-04-02 RX ORDER — ALBUTEROL SULFATE 90 UG/1
AEROSOL, METERED RESPIRATORY (INHALATION)
Qty: 18 G | Refills: 1 | Status: CANCELLED | OUTPATIENT
Start: 2024-04-02

## 2024-04-02 NOTE — TELEPHONE ENCOUNTER
Spoke with the pt, notified that she will need a f/u visit for refills.  She v/u.  Unable to find a f/u slot.

## 2024-04-02 NOTE — TELEPHONE ENCOUNTER
Care Due:                  Date            Visit Type   Department     Provider  --------------------------------------------------------------------------------                                EP -                              PRIMARY      [facility] FAMILY  Last Visit: 03-      CARE ([facility])   MEDICINE       Paul Larson  Next Visit: None Scheduled  None         None Found                                                            Last  Test          Frequency    Reason                     Performed    Due Date  --------------------------------------------------------------------------------    Office Visit  12 months..  busPIRone, nicotine,       03- 02-                             pantoprazole.............    [employer] Embedded Care Due Messages. Reference number: [id number].   4/02/2024 2:06:45 AM CDT

## 2024-04-03 RX ORDER — PANTOPRAZOLE SODIUM 40 MG/1
40 TABLET, DELAYED RELEASE ORAL DAILY
Qty: 90 TABLET | Refills: 0 | Status: SHIPPED | OUTPATIENT
Start: 2024-04-03

## 2024-04-03 NOTE — TELEPHONE ENCOUNTER
Refill Routing Note   Medication(s) are not appropriate for processing by [facility] for the following reason(s):        No active prescription written by provider:     [facility] action(s):  Defer      Medication Therapy Plan:         Appointments  past 12m or future 3m with PCP    Date Provider   Last Visit   3/1/2023 Paul Larson MD   Next Visit   Visit date not found Paul Larson MD   ED visits in past 90 days: 0        Note composed:9:59 AM 04/03/2024

## 2024-04-03 NOTE — TELEPHONE ENCOUNTER
Refill Routing Note   Medication(s) are not appropriate for processing by [facility] for the following reason(s):        Outside of protocol  Required vitals outdated: HR    [facility] action(s):  Defer  Route  Approve   Requires appointment : Yes      Medication Therapy Plan:  Due for annual with PCP    Alert overridden per protocol: Yes     Appointments  past 12m or future 3m with PCP    Date Provider   Last Visit   3/1/2023 Paul Larson MD   Next Visit   4/2/2024 Paul Larson MD   ED visits in past 90 days: 0        Note composed:10:16 AM 04/03/2024

## 2024-04-04 RX ORDER — PNV 112/IRON/FOLIC/OM3/DHA/EPA 3.33-.33MG
2 TABLET,CHEWABLE ORAL DAILY
Qty: 180 TABLET | Refills: 1 | Status: SHIPPED | OUTPATIENT
Start: 2024-04-04

## 2024-04-04 RX ORDER — BUSPIRONE HYDROCHLORIDE 30 MG/1
30 TABLET ORAL 2 TIMES DAILY
Qty: 60 TABLET | Refills: 1 | Status: SHIPPED | OUTPATIENT
Start: 2024-04-04

## 2024-04-04 RX ORDER — ONDANSETRON 8 MG/1
8 TABLET, ORALLY DISINTEGRATING ORAL EVERY 6 HOURS PRN
Qty: 30 TABLET | Refills: 1 | Status: SHIPPED | OUTPATIENT
Start: 2024-04-04

## 2024-04-04 RX ORDER — ALBUTEROL SULFATE 2.5 MG/.5ML
2.5 SOLUTION RESPIRATORY (INHALATION) EVERY 6 HOURS PRN
Qty: 1 EACH | Refills: 2 | Status: SHIPPED | OUTPATIENT
Start: 2024-04-04 | End: 2024-05-18 | Stop reason: SDUPTHER

## 2024-04-04 RX ORDER — FAMOTIDINE 40 MG/1
40 TABLET, FILM COATED ORAL DAILY
Qty: 90 TABLET | Refills: 1 | Status: SHIPPED | OUTPATIENT
Start: 2024-04-04

## 2024-04-04 RX ORDER — IBUPROFEN 200 MG
1 TABLET ORAL DAILY
Qty: 28 PATCH | Refills: 1 | Status: SHIPPED | OUTPATIENT
Start: 2024-04-04

## 2024-04-04 RX ORDER — BUTALBITAL, ACETAMINOPHEN AND CAFFEINE 50; 325; 40 MG/1; MG/1; MG/1
1 TABLET ORAL DAILY
Qty: 30 TABLET | Refills: 1 | Status: SHIPPED | OUTPATIENT
Start: 2024-04-04

## 2024-04-16 ENCOUNTER — E-VISIT (OUTPATIENT)
Dept: FAMILY MEDICINE | Facility: CLINIC | Age: 34
End: 2024-04-16
Payer: MEDICAID

## 2024-04-16 DIAGNOSIS — K04.7 DENTAL INFECTION: Primary | ICD-10-CM

## 2024-04-16 PROCEDURE — 99421 OL DIG E/M SVC 5-10 MIN: CPT | Mod: S$PBB,,, | Performed by: FAMILY MEDICINE

## 2024-04-16 RX ORDER — AMOXICILLIN 500 MG/1
500 TABLET, FILM COATED ORAL EVERY 12 HOURS
Qty: 20 TABLET | Refills: 0 | Status: SHIPPED | OUTPATIENT
Start: 2024-04-16 | End: 2024-04-26

## 2024-04-16 NOTE — PATIENT INSTRUCTIONS
Follow up if symptoms worsen or fail to improve.     Dear patient,   As a result of recent federal legislation (The Federal Cures Act), you may receive lab or pathology results from your visit in your [facility] account before your physician is able to contact you. Your physician or their representative will relay the results to you with their recommendations at their soonest availability.     If no improvement in symptoms or symptoms worsen, please be advised to call MD, follow-up at clinic and/or go to ER if becomes severe.    Paul Larson M.D.        We Offer TELEHEALTH & Same Day Appointments!   Book your Telehealth appointment with me through my nurse or   Clinic appointments on [facility]!    [address] [place] [address]    Office: [phone]   FAX: [phone]    Check out my Facebook Page and Follow Me at: [email or web address]    Check out my website at [facility] by clicking on: [email or web address]    To Schedule appointments online, go to [email or web address]: [email or web address]

## 2024-04-16 NOTE — PROGRESS NOTES
04/16/2024   Patient ID: Sara Sanford is a 33 y.o. female.    Chief Complaint: Tooth infection    The patient initiated a request through [employer] on 4/16/2024 for evaluation and management with a chief complaint of Tooth infection     I evaluated the questionnaire submission on 04/16/2024  .    [facility]    4/16/2024  8:28 AM CDT - Filed by Patient   Do you agree to participate in an E-Visit? Yes   If you have any of the following symptoms, please present to your local ER or call 911:  I acknowledge   What is the main issue you would like addressed today? Toothache i just needed some antibiotics til i can get to the dentist next wk   Are you able to take your vital signs? No   Are you pregnant, could you be pregnant, or are you breast feeding? None of the above   Please describe your symptoms Toothache and headache   Where is your problem located? Mouth (have dentist appt next wk) need antibiotics tho   How severe are your symptoms? Severe   Have you had these symptoms before? No   How long have you been having these symptoms? For a few days   Please list any medications or treatments you have used for your condition and indicate if it was effective or not.    What makes this feel better? Laying still   What makes this feel worse? Moving around   Are these symptoms related to a condition that you currently have? No   Please describe any probable cause for these symptoms It makes my migraines worse and face hurts   Provide any additional information you feel is important. Just need some antibiotics to help the infection til i can get in the dentist   Please attach any relevant images or files          Encounter Diagnosis   Name Primary?    Dental infection Yes        No orders of the defined types were placed in this encounter.     Medications Ordered This Encounter   Medications    amoxicillin (AMOXIL) 500 MG Tab     Sig: Take 1 tablet (500 mg total) by mouth every 12 (twelve) hours. for 10 days      Dispense:  20 tablet     Refill:  0        Follow up if symptoms worsen or fail to improve.      E-Visit Time Tracking:    Day 1 Time (in minutes): 6    Total Time (in minutes): 6

## 2024-05-18 ENCOUNTER — PATIENT MESSAGE (OUTPATIENT)
Dept: FAMILY MEDICINE | Facility: CLINIC | Age: 34
End: 2024-05-18
Payer: MEDICAID

## 2024-05-18 DIAGNOSIS — Z79.899 ENCOUNTER FOR LONG-TERM (CURRENT) USE OF MEDICATIONS: ICD-10-CM

## 2024-05-18 RX ORDER — BUTALBITAL, ACETAMINOPHEN AND CAFFEINE 50; 325; 40 MG/1; MG/1; MG/1
1 TABLET ORAL DAILY
Qty: 30 TABLET | Refills: 1 | Status: CANCELLED | OUTPATIENT
Start: 2024-05-18

## 2024-05-18 NOTE — TELEPHONE ENCOUNTER
No care due was identified.  Health [facility] Embedded Care Due Messages. Reference number: [id number].   5/18/2024 7:16:06 AM CDT

## 2024-05-20 ENCOUNTER — TELEPHONE (OUTPATIENT)
Dept: FAMILY MEDICINE | Facility: CLINIC | Age: 34
End: 2024-05-20
Payer: MEDICAID

## 2024-05-20 RX ORDER — ALBUTEROL SULFATE 2.5 MG/.5ML
2.5 SOLUTION RESPIRATORY (INHALATION) EVERY 6 HOURS PRN
Qty: 225 EACH | Refills: 0 | Status: SHIPPED | OUTPATIENT
Start: 2024-05-20 | End: 2024-06-06 | Stop reason: SDUPTHER

## 2024-05-20 NOTE — TELEPHONE ENCOUNTER
Refill Routing Note   Medication(s) are not appropriate for processing by [facility] for the following reason(s):        Required vitals outdated    [facility] action(s):  Defer               Appointments  past 12m or future 3m with PCP    Date Provider   Last Visit   4/16/2024 Paul Larson MD   Next Visit   Visit date not found Paul Larson MD   ED visits in past 90 days: 0        Note composed:10:24 AM 05/20/2024

## 2024-05-20 NOTE — PROGRESS NOTES
Depo Provera 150mg given IM to right VG, s difficulty, tolerated well.  Instructed in importance of scheduling within a required time frame, for next injection.  Stressed importance of communicating with staff if appointment can't be kept, or how she should not leave, after checking in, without notifying staff as this causes medication waste.  
Patient/Son

## 2024-06-06 ENCOUNTER — E-VISIT (OUTPATIENT)
Dept: FAMILY MEDICINE | Facility: CLINIC | Age: 34
End: 2024-06-06
Payer: MEDICAID

## 2024-06-06 ENCOUNTER — PATIENT MESSAGE (OUTPATIENT)
Dept: FAMILY MEDICINE | Facility: CLINIC | Age: 34
End: 2024-06-06
Payer: MEDICAID

## 2024-06-06 DIAGNOSIS — Z79.899 ENCOUNTER FOR LONG-TERM (CURRENT) USE OF MEDICATIONS: ICD-10-CM

## 2024-06-06 DIAGNOSIS — R21 RASH: Primary | ICD-10-CM

## 2024-06-06 PROCEDURE — 99422 OL DIG E/M SVC 11-20 MIN: CPT | Mod: ,,, | Performed by: STUDENT IN AN ORGANIZED HEALTH CARE EDUCATION/TRAINING PROGRAM

## 2024-06-06 RX ORDER — VALACYCLOVIR HYDROCHLORIDE 1 G/1
TABLET, FILM COATED ORAL
Qty: 24 TABLET | Refills: 0 | Status: SHIPPED | OUTPATIENT
Start: 2024-06-06

## 2024-06-06 RX ORDER — ACYCLOVIR 50 MG/G
OINTMENT TOPICAL
Qty: 15 G | Refills: 1 | Status: SHIPPED | OUTPATIENT
Start: 2024-06-06

## 2024-06-06 RX ORDER — ALBUTEROL SULFATE 2.5 MG/.5ML
2.5 SOLUTION RESPIRATORY (INHALATION) EVERY 6 HOURS PRN
Qty: 30 EACH | Refills: 0 | Status: SHIPPED | OUTPATIENT
Start: 2024-06-06

## 2024-06-06 RX ORDER — BUTALBITAL, ACETAMINOPHEN AND CAFFEINE 50; 325; 40 MG/1; MG/1; MG/1
1 TABLET ORAL DAILY
Qty: 30 TABLET | Refills: 1 | OUTPATIENT
Start: 2024-06-06

## 2024-06-06 NOTE — TELEPHONE ENCOUNTER
Care Due:                  Date            Visit Type   Department     Provider  --------------------------------------------------------------------------------                                EP -                              PRIMARY      [facility] FAMILY  Last Visit: 03-      CARE ([facility])   MEDICINE       Paul Larson  Next Visit: None Scheduled  None         None Found                                                            Last  Test          Frequency    Reason                     Performed    Due Date  --------------------------------------------------------------------------------    Office Visit  12 months..  busPIRone, nicotine,       03- 02-                             pantoprazole.............    [employer] Embedded Care Due Messages. Reference number: [id number].   6/06/2024 2:21:03 PM CDT
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
## 2024-06-11 ENCOUNTER — PATIENT MESSAGE (OUTPATIENT)
Dept: FAMILY MEDICINE | Facility: CLINIC | Age: 34
End: 2024-06-11
Payer: MEDICAID

## 2024-06-21 ENCOUNTER — PATIENT MESSAGE (OUTPATIENT)
Dept: OBSTETRICS AND GYNECOLOGY | Facility: CLINIC | Age: 34
End: 2024-06-21
Payer: MEDICAID

## 2024-06-21 DIAGNOSIS — J42 CHRONIC BRONCHITIS, UNSPECIFIED CHRONIC BRONCHITIS TYPE: ICD-10-CM

## 2024-06-22 RX ORDER — ALBUTEROL SULFATE 90 UG/1
2 AEROSOL, METERED RESPIRATORY (INHALATION) EVERY 4 HOURS PRN
Qty: 18 G | Refills: 1 | Status: SHIPPED | OUTPATIENT
Start: 2024-06-22

## 2024-06-22 NOTE — TELEPHONE ENCOUNTER
No care due was identified.  Health [facility] Embedded Care Due Messages. Reference number: [id number].   6/21/2024 11:49:08 PM CDT

## 2024-06-22 NOTE — TELEPHONE ENCOUNTER
Refill Routing Note   Medication(s) are not appropriate for processing by [facility] for the following reason(s):        Patient not seen by provider within 15 months  Required vitals outdated    [facility] action(s):  Defer             Appointments  past 12m or future 3m with PCP    Date Provider   Last Visit   4/16/2024 Paul Larson MD   Next Visit   6/21/2024 Paul Larson MD   ED visits in past 90 days: 0        Note composed:11:55 PM 06/21/2024

## 2024-06-24 ENCOUNTER — PATIENT MESSAGE (OUTPATIENT)
Dept: FAMILY MEDICINE | Facility: CLINIC | Age: 34
End: 2024-06-24
Payer: MEDICAID

## 2024-06-26 ENCOUNTER — E-VISIT (OUTPATIENT)
Dept: FAMILY MEDICINE | Facility: CLINIC | Age: 34
End: 2024-06-26
Payer: MEDICAID

## 2024-06-26 DIAGNOSIS — R51.9 NONINTRACTABLE HEADACHE, UNSPECIFIED CHRONICITY PATTERN, UNSPECIFIED HEADACHE TYPE: Primary | ICD-10-CM

## 2024-06-26 DIAGNOSIS — Z79.899 ENCOUNTER FOR LONG-TERM (CURRENT) USE OF MEDICATIONS: ICD-10-CM

## 2024-06-26 DIAGNOSIS — R21 RASH: ICD-10-CM

## 2024-06-26 RX ORDER — VALACYCLOVIR HYDROCHLORIDE 1 G/1
TABLET, FILM COATED ORAL
Qty: 4 TABLET | Refills: 0 | Status: SHIPPED | OUTPATIENT
Start: 2024-06-26

## 2024-06-26 RX ORDER — BUTALBITAL, ACETAMINOPHEN AND CAFFEINE 50; 325; 40 MG/1; MG/1; MG/1
1 TABLET ORAL DAILY
Qty: 30 TABLET | Refills: 0 | Status: SHIPPED | OUTPATIENT
Start: 2024-06-26

## 2024-06-26 NOTE — TELEPHONE ENCOUNTER
Refill Routing Note   Medication(s) are not appropriate for processing by [facility] for the following reason(s):        Patient not seen by provider within 15 months  Required labs outdated  New or recently adjusted medication  No active prescription written by provider    [facility] action(s):  Defer               Appointments  past 12m or future 3m with PCP    Date Provider   Last Visit   4/16/2024 Paul Larson MD   Next Visit   6/26/2024 Paul Larson MD   ED visits in past 90 days: 0        Note composed:10:53 AM 06/26/2024

## 2024-06-26 NOTE — TELEPHONE ENCOUNTER
No care due was identified.  [facility] Embedded Care Due Messages. Reference number: [id number].   6/26/2024 9:55:42 AM CDT

## 2024-06-26 NOTE — PATIENT INSTRUCTIONS
Follow up if symptoms worsen or fail to improve.     Dear patient,   As a result of recent federal legislation (The Federal Cures Act), you may receive lab or pathology results from your visit in your [facility] account before your physician is able to contact you. Your physician or their representative will relay the results to you with their recommendations at their soonest availability.     If no improvement in symptoms or symptoms worsen, please be advised to call MD, follow-up at clinic and/or go to ER if becomes severe.    Paul Larson M.D.        We Offer TELEHEALTH & Same Day Appointments!   Book your Telehealth appointment with me through my nurse or   Clinic appointments on [facility]!    [address] [place] [address]    Office: [phone]   FAX: [phone]    Check out my Facebook Page and Follow Me at: [email or web address]    Check out my website at [facility] by clicking on: [email or web address]    To Schedule appointments online, go to [email or web address]: [email or web address]

## 2024-06-26 NOTE — PROGRESS NOTES
" Patient ID: Sara Sanford is a 33 y.o. female.    Chief Complaint: Mood Disorder (Entered automatically based on patient selection in Patient Portal.)    The patient initiated a request through [employer] on 6/26/2024 for evaluation and management with a chief complaint of Mood Disorder (Entered automatically based on patient selection in Patient Portal.)     I evaluated the questionnaire submission on 06/26/2024  .    [facility] Peq Evisit Anxiety/Depression    6/26/2024 10:30 AM CDT - Filed by Patient   Do you agree to participate in an E-Visit? Yes   If you have any of the following symptoms, please present to your local emergency room or call 911:  I acknowledge   Medication requests for narcotics will not be addressed via an E-Visit.  Please schedule an appointment. I acknowledge   Are you pregnant, could you be pregnant, or are you breast feeding? None of the above   What is the main issue you would like addressed today? Need to make an appt to be seen but also having migraines really bad and needed to see if he would refill the firocet today and i can do a video visit today if needed but need in person appt for my checkup. Im also having struggling with my anxiety alot   Fear of embarrassment causes me to avoid doing things or speaking to people. Yes   I avoid activities in which I am the center of attention. Yes   Being embarrassed or looking stupid are among my worst fears. Yes   I would like to address: Medication for Anxiety or Depression   By selecting "I understand," you acknowledge that the answers that you provide for this questionnaire may not be immediately viewed by your provider or your care team. If you are personally dealing with suicidal thoughts or a crisis, please consider contacting your provider's office.  If your provider is not available, please consider taking action by: calling 911 or the National Suicide Prevention Lifeline any day, any time at 1-762.879.9127 or texting SIGNS to 859067 for " 24/7 anonymous, free crisis counseling. I understand   Over the last 2 weeks, how often have you been bothered by the following problems?   Little interest or pleasure in doing things: More than half the days   Feeling down, depressed or hopeless: More than half the days   Patient's PHQ score (range: 0 - 6) 612275   Feeling nervous, anxious, on edge Nearly everyday   Not being able to stop or control worrying Nearly everyday   Worrying too much about different things Nearly everyday   Trouble relaxing Nearly everyday   Being so restless that its hard to sit still Nearly everyday   Becoming easily annoyed or irritable More than half the days   Feeling afraid as if something awful might happen Several days   If you marked you are experiencing any of the aforementioned problems, how difficult have these made it for you to do your work, take care of things at home, or get along with other people? Extremely difficult   TOTAL SCORE: (range: 0 - 21) 18   Do you want to address a new or existing medication? I would like to start a new medication that I do not already take   What is the name of the medication that you would like to start? Valium 5mg is what i used for over 10 or so years that worked best as needed for my panic attacks and anxiety.   Have you taken a similar medication in the past? Yes   What was the name of the similar medication? Ghislaine used many different ones that were benzos but that one was most effective and i only took it as needed.   Why are you no longer on that medication? No specific reason    What medical condition is the  medication intended to treat? Anxiety & mostly for severe panic attacks im having. I cant breathe and im not able to deal with my daily responsibilities as a mom. Im seeing counselor for help also.   Provide any additional information you feel is important. It should be in my medical history on the meds i have tried already from [facility] but here is my dr i used to see that  prescribed it   Please attach any relevant images or files    Are you able to take your vital signs? Yes   Systolic Blood Pressure: 120   Diastolic Blood Pressure: 79   Weight: 118   Height: 60   Pulse: 88   Temperature: 98.2   Respiration rate: 18   Pulse Oxygen:          Encounter Diagnoses   Name Primary?    Nonintractable headache, unspecified chronicity pattern, unspecified headache type Yes    Encounter for long-term (current) use of medications         No orders of the defined types were placed in this encounter.     Medications Ordered This Encounter   Medications    butalbital-acetaminophen-caffeine -40 mg (FIORICET, ESGIC) -40 mg per tablet     Sig: Take 1 tablet by mouth once daily.     Dispense:  30 tablet     Refill:  0        Follow up if symptoms worsen or fail to improve.      E-Visit Time Tracking:    Day 1 Time (in minutes): 7    Total Time (in minutes): 7

## 2024-07-03 ENCOUNTER — TELEPHONE (OUTPATIENT)
Dept: FAMILY MEDICINE | Facility: CLINIC | Age: 34
End: 2024-07-03
Payer: MEDICAID

## 2024-07-17 ENCOUNTER — PATIENT MESSAGE (OUTPATIENT)
Dept: OBSTETRICS AND GYNECOLOGY | Facility: CLINIC | Age: 34
End: 2024-07-17
Payer: MEDICAID

## 2024-07-17 ENCOUNTER — PATIENT MESSAGE (OUTPATIENT)
Dept: NEUROLOGY | Facility: CLINIC | Age: 34
End: 2024-07-17
Payer: MEDICAID

## 2024-08-01 DIAGNOSIS — R21 RASH: ICD-10-CM

## 2024-08-01 DIAGNOSIS — Z79.899 ENCOUNTER FOR LONG-TERM (CURRENT) USE OF MEDICATIONS: ICD-10-CM

## 2024-08-01 RX ORDER — VALACYCLOVIR HYDROCHLORIDE 1 G/1
TABLET, FILM COATED ORAL
Qty: 4 TABLET | Refills: 0 | Status: SHIPPED | OUTPATIENT
Start: 2024-08-01

## 2024-08-01 RX ORDER — BUTALBITAL, ACETAMINOPHEN AND CAFFEINE 50; 325; 40 MG/1; MG/1; MG/1
1 TABLET ORAL DAILY
Qty: 30 TABLET | Refills: 0 | OUTPATIENT
Start: 2024-08-01

## 2024-08-01 NOTE — TELEPHONE ENCOUNTER
No care due was identified.  [facility] Embedded Care Due Messages. Reference number: [id number].   8/01/2024 2:39:35 PM CDT

## 2024-08-01 NOTE — TELEPHONE ENCOUNTER
No care due was identified.  [facility] Embedded Care Due Messages. Reference number: [id number].   8/01/2024 1:33:39 PM CDT

## 2024-08-05 ENCOUNTER — TELEPHONE (OUTPATIENT)
Dept: FAMILY MEDICINE | Facility: CLINIC | Age: 34
End: 2024-08-05
Payer: MEDICAID

## 2024-08-05 ENCOUNTER — TELEPHONE (OUTPATIENT)
Dept: NEUROLOGY | Facility: CLINIC | Age: 34
End: 2024-08-05
Payer: MEDICAID

## 2024-08-15 ENCOUNTER — PATIENT MESSAGE (OUTPATIENT)
Dept: FAMILY MEDICINE | Facility: CLINIC | Age: 34
End: 2024-08-15
Payer: MEDICAID

## 2024-08-15 ENCOUNTER — PATIENT MESSAGE (OUTPATIENT)
Dept: OBSTETRICS AND GYNECOLOGY | Facility: CLINIC | Age: 34
End: 2024-08-15
Payer: MEDICAID

## 2024-08-15 ENCOUNTER — PATIENT MESSAGE (OUTPATIENT)
Dept: OBSTETRICS AND GYNECOLOGY | Facility: CLINIC | Age: 34
End: 2024-08-15

## 2024-08-15 ENCOUNTER — OFFICE VISIT (OUTPATIENT)
Dept: OBSTETRICS AND GYNECOLOGY | Facility: CLINIC | Age: 34
End: 2024-08-15
Payer: MEDICAID

## 2024-08-15 VITALS — DIASTOLIC BLOOD PRESSURE: 84 MMHG | SYSTOLIC BLOOD PRESSURE: 122 MMHG | WEIGHT: 122.38 LBS | BODY MASS INDEX: 21 KG/M2

## 2024-08-15 DIAGNOSIS — Z79.899 ENCOUNTER FOR LONG-TERM (CURRENT) USE OF MEDICATIONS: ICD-10-CM

## 2024-08-15 DIAGNOSIS — Z01.419 ENCOUNTER FOR GYNECOLOGICAL EXAMINATION: ICD-10-CM

## 2024-08-15 DIAGNOSIS — Z11.3 SCREEN FOR STD (SEXUALLY TRANSMITTED DISEASE): Primary | ICD-10-CM

## 2024-08-15 PROCEDURE — 87491 CHLMYD TRACH DNA AMP PROBE: CPT | Performed by: SPECIALIST

## 2024-08-15 PROCEDURE — 99999 PR PBB SHADOW E&M-EST. PATIENT-LVL III: CPT | Mod: PBBFAC,,, | Performed by: SPECIALIST

## 2024-08-15 PROCEDURE — 99213 OFFICE O/P EST LOW 20 MIN: CPT | Mod: PBBFAC,PN | Performed by: SPECIALIST

## 2024-08-15 PROCEDURE — 87591 N.GONORRHOEAE DNA AMP PROB: CPT | Performed by: SPECIALIST

## 2024-08-15 RX ORDER — BUTALBITAL, ACETAMINOPHEN AND CAFFEINE 50; 325; 40 MG/1; MG/1; MG/1
1 TABLET ORAL DAILY
Qty: 30 TABLET | Refills: 0 | Status: SHIPPED | OUTPATIENT
Start: 2024-08-15

## 2024-08-15 NOTE — PROGRESS NOTES
32 yo WF  presents for annual gyn eval. Pt desires STD screening No overt gyn complaints  Past Medical History:   Diagnosis Date    Abnormal Pap smear     ALLERGIC RHINITIS     Anemia     Anxiety     Bipolar 1 disorder     Chronic back pain     Drug-seeking behavior 2012    Epilepsy     GERD (gastroesophageal reflux disease)     GERD (gastroesophageal reflux disease)     Hepatitis C 2018    Medications given     Opioid dependence 2012    Pre-eclampsia     Seizures     last episode , EEG-normal;     Urinary tract infection        Past Surgical History:   Procedure Laterality Date    CHOLECYSTECTOMY      LAPAROSCOPIC LIGATION OF FALLOPIAN TUBE Bilateral 2022    Procedure: LIGATION, FALLOPIAN TUBE, LAPAROSCOPIC;  Surgeon: Kristian Landaverde MD;  Location: [facility];  Service: OB/GYN;  Laterality: Bilateral;    mirena removal  2018    PELVIC LAPAROSCOPY      iud retrieval     TUBAL LIGATION      VAGINAL DELIVERY      times 4    vestibulectomy      WISDOM TOOTH EXTRACTION         Family History   Problem Relation Name Age of Onset    Stroke Mother Madelin Louisa Blades     Depression Mother Madelin Louisa Blades         Anxiety depression and heart    Heart disease Mother Madelin Louisa Blades         Heart problems and stroke    Depression Sister Rosalinda         Anxiety depression    Heart disease Maternal Grandmother Binta grayson louisa         Heart issues (murmur)    Early death Sister Kat barclay         Her two daughters (seperate pregnancy stillborn twice)    Breast cancer Neg Hx      Ovarian cancer Neg Hx         Social History     Socioeconomic History    Marital status: Single   Tobacco Use    Smoking status: Every Day     Current packs/day: 0.50     Average packs/day: 0.5 packs/day for 10.0 years (5.0 ttl pk-yrs)     Types: Cigarettes     Passive exposure: Never    Smokeless tobacco: Never    Tobacco comments:     Trying to quit   Substance and Sexual Activity    Alcohol use: Not  Currently     Comment: occasional    Drug use: Not Currently     Types: Hydrocodone     Comment: past histoy last 4/7/2022    Sexual activity: Yes     Partners: Male     Birth control/protection: None     Social Determinants of Health     Financial Resource Strain: Low Risk  (3/3/2020)    Overall Financial Resource Strain (CARDIA)     Difficulty of Paying Living Expenses: Not very hard   Food Insecurity: No Food Insecurity (3/3/2020)    Hunger Vital Sign     Worried About Running Out of Food in the Last Year: Never true     Ran Out of Food in the Last Year: Never true   Transportation Needs: No Transportation Needs (3/3/2020)    PRAPARE - Transportation     Lack of Transportation (Medical): No     Lack of Transportation (Non-Medical): No   Physical Activity: Inactive (3/27/2020)    Exercise Vital Sign     Days of Exercise per Week: 0 days     Minutes of Exercise per Session: 0 min   Stress: Stress Concern Present (3/3/2020)    [employer] [employer] - Occupational Stress Questionnaire     Feeling of Stress : Very much       Current Outpatient Medications   Medication Sig Dispense Refill    acyclovir 5% (ZOVIRAX) 5 % ointment Apply topically 5 (five) times daily. 15 g 1    albuterol (PROVENTIL/VENTOLIN HFA) 90 mcg/actuation inhaler Inhale 2 puffs into the lungs every 4 (four) hours as needed for Wheezing. Rescue 18 g 1    albuterol sulfate 2.5 mg/0.5 mL Nebu Take 2.5 mg by nebulization every 6 (six) hours as needed (shortness of breath). Rescue 30 each 0    brivaracetam (BRIVIACT) 100 mg Tab Take 1 tablet (100 mg total) by mouth 2 (two) times daily. 60 tablet 5    busPIRone (BUSPAR) 30 MG Tab Take 1 tablet (30 mg total) by mouth 2 (two) times daily. 60 tablet 1    butalbital-acetaminophen-caffeine -40 mg (FIORICET, ESGIC) -40 mg per tablet Take 1 tablet by mouth once daily. 30 tablet 0    famotidine (PEPCID) 40 MG tablet Take 1 tablet (40 mg total) by mouth once daily. Due for annual  with PCP 90 tablet 1    gabapentin (NEURONTIN) 800 MG tablet Take 1 tablet (800 mg total) by mouth 3 (three) times daily. 90 tablet 0    hydrOXYzine pamoate (VISTARIL) 50 MG Cap Take 50 mg by mouth daily as needed.      nicotine (NICODERM CQ) 21 mg/24 hr Place 1 patch onto the skin once daily. 28 patch 1    ondansetron (ZOFRAN-ODT) 8 MG TbDL Take 1 tablet (8 mg total) by mouth every 6 (six) hours as needed (nausea). 30 tablet 1    pantoprazole (PROTONIX) 40 MG tablet Take 1 tablet (40 mg total) by mouth once daily. Due for annual with PCP 90 tablet 0    QUEtiapine (SEROQUEL) 300 MG Tab Take 300 mg by mouth every evening.      valACYclovir (VALTREX) 1000 MG tablet TAKE 2 PILLS BY MOUTH EVERY TWELVE HOURS FOR 1 DAY AS NEEDED FIRST SIGNS OF RASH 4 tablet 0    VITAFOL GUMMIES 3.33 mg iron- 0.33 mg Chew Take 2 tablets by mouth once daily. 180 tablet 1    mupirocin (BACTROBAN) 2 % ointment 2 (two) times daily. apply to affected area (Patient not taking: Reported on 8/15/2024)       No current facility-administered medications for this visit.       Review of patient's allergies indicates:   Allergen Reactions    Darvocet a500 [propoxyphene n-acetaminophen]     Phenytoin sodium extended Other (See Comments)       Review of System:   General: no chills, fever, night sweats, weight gain or weight loss  Psychological: no depression or suicidal ideation  Breasts: no new or changing breast lumps, nipple discharge or masses.  Respiratory: no cough, shortness of breath, or wheezing  Cardiovascular: no chest pain or dyspnea on exertion  Gastrointestinal: no abdominal pain, change in bowel habits, or black or bloody stools  Genito-Urinary: no incontinence, urinary frequency/urgency or vulvar/vaginal symptoms, pelvic pain or abnormal vaginal bleeding.  Musculoskeletal: no gait disturbance or muscular weakness                                               General Appearance    A and O x 4, Cooperative, no distress   Breasts    Abdomen    Symmetrical, no masses, no discharge, skin changes , erythema or retraction. No adenopathy  Soft, non-tender, bowel sounds active all four quadrants,  no masses, no organomegaly    Genitourinary:   External rectal exam shows no thrombosed external hemorrhoids.   Pelvic exam was performed with patient supine.  No labial fusion.  There is no rash, lesion or injury on the right labia.  There is no rash, lesion or injury on the left labia.  No bleeding and no signs of injury around the vaginal introitus, urethra is without lesions and well supported. The cervix is visualized with no discharge, lesions or friability.  No vaginal discharge found.  No significant Cystocele, Enterocele or rectocele, and uterus well supported.  Bimanual exam:  The urethra is normal to palpation and there are no palpable vaginal wall masses.  Uterus is not deviated, not enlarged, not fixed, normal shape and not tender.  Cervix exhibits no motion tenderness.   Right adnexum displays no mass and no tenderness.  Left adnexum displays no mass and no tenderness.   Extremities: Extremities normal, atraumatic, no cyanosis or edema                     NOTE  NURSING PERSONAL PRESENT FOR ENTIRE PHYSICAL EXAM     PAP, cervical cultures submitted  Will obtain STD panel and follow    I spent a total of 30 minutes on the day of the visit. This includes face to face time and non-face to face time preparing to see the patient (eg, review of tests), obtaining and/or reviewing separately obtained history, documenting clinical information in the electronic or other health record, independently interpreting results and communicating results to the patient/family/caregiver, or care coordinator.

## 2024-08-16 ENCOUNTER — TELEPHONE (OUTPATIENT)
Dept: NEUROLOGY | Facility: CLINIC | Age: 34
End: 2024-08-16
Payer: MEDICAID

## 2024-08-17 LAB
C TRACH DNA SPEC QL NAA+PROBE: NOT DETECTED
N GONORRHOEA DNA SPEC QL NAA+PROBE: NOT DETECTED

## 2024-08-19 ENCOUNTER — LAB VISIT (OUTPATIENT)
Dept: LAB | Facility: HOSPITAL | Age: 34
End: 2024-08-19
Attending: FAMILY MEDICINE
Payer: MEDICAID

## 2024-08-19 DIAGNOSIS — Z13.6 ENCOUNTER FOR LIPID SCREENING FOR CARDIOVASCULAR DISEASE: ICD-10-CM

## 2024-08-19 DIAGNOSIS — Z79.899 ENCOUNTER FOR LONG-TERM (CURRENT) USE OF MEDICATIONS: ICD-10-CM

## 2024-08-19 DIAGNOSIS — Z13.220 ENCOUNTER FOR LIPID SCREENING FOR CARDIOVASCULAR DISEASE: ICD-10-CM

## 2024-08-19 LAB
ALBUMIN SERPL BCP-MCNC: 4.2 G/DL (ref 3.5–5.2)
ALP SERPL-CCNC: 65 U/L (ref 55–135)
ALT SERPL W/O P-5'-P-CCNC: 14 U/L (ref 10–44)
ANION GAP SERPL CALC-SCNC: 9 MMOL/L (ref 8–16)
AST SERPL-CCNC: 16 U/L (ref 10–40)
BILIRUB SERPL-MCNC: 0.3 MG/DL (ref 0.1–1)
BUN SERPL-MCNC: 15 MG/DL (ref 6–20)
CALCIUM SERPL-MCNC: 9.5 MG/DL (ref 8.7–10.5)
CHLORIDE SERPL-SCNC: 105 MMOL/L (ref 95–110)
CHOLEST SERPL-MCNC: 90 MG/DL (ref 120–199)
CHOLEST/HDLC SERPL: 2 {RATIO} (ref 2–5)
CO2 SERPL-SCNC: 26 MMOL/L (ref 23–29)
CREAT SERPL-MCNC: 0.9 MG/DL (ref 0.5–1.4)
EST. GFR  (NO RACE VARIABLE): >60 ML/MIN/1.73 M^2
GLUCOSE SERPL-MCNC: 88 MG/DL (ref 70–110)
HDLC SERPL-MCNC: 46 MG/DL (ref 40–75)
HDLC SERPL: 51.1 % (ref 20–50)
LDLC SERPL CALC-MCNC: 32 MG/DL (ref 63–159)
NONHDLC SERPL-MCNC: 44 MG/DL
POTASSIUM SERPL-SCNC: 4.2 MMOL/L (ref 3.5–5.1)
PROT SERPL-MCNC: 6.9 G/DL (ref 6–8.4)
SODIUM SERPL-SCNC: 140 MMOL/L (ref 136–145)
TRIGL SERPL-MCNC: 60 MG/DL (ref 30–150)

## 2024-08-19 PROCEDURE — 80061 LIPID PANEL: CPT | Performed by: FAMILY MEDICINE

## 2024-08-19 PROCEDURE — 36415 COLL VENOUS BLD VENIPUNCTURE: CPT | Mod: PO | Performed by: FAMILY MEDICINE

## 2024-08-19 PROCEDURE — 80053 COMPREHEN METABOLIC PANEL: CPT | Performed by: FAMILY MEDICINE

## 2024-08-20 ENCOUNTER — TELEPHONE (OUTPATIENT)
Dept: OBSTETRICS AND GYNECOLOGY | Facility: CLINIC | Age: 34
End: 2024-08-20
Payer: MEDICAID

## 2024-08-20 ENCOUNTER — LAB VISIT (OUTPATIENT)
Dept: LAB | Facility: HOSPITAL | Age: 34
End: 2024-08-20
Attending: SPECIALIST
Payer: MEDICAID

## 2024-08-20 ENCOUNTER — PATIENT MESSAGE (OUTPATIENT)
Dept: OBSTETRICS AND GYNECOLOGY | Facility: CLINIC | Age: 34
End: 2024-08-20
Payer: MEDICAID

## 2024-08-20 DIAGNOSIS — Z11.3 SCREEN FOR STD (SEXUALLY TRANSMITTED DISEASE): Primary | ICD-10-CM

## 2024-08-20 DIAGNOSIS — Z11.3 SCREEN FOR STD (SEXUALLY TRANSMITTED DISEASE): ICD-10-CM

## 2024-08-20 PROCEDURE — 86696 HERPES SIMPLEX TYPE 2 TEST: CPT | Performed by: SPECIALIST

## 2024-08-20 PROCEDURE — 87389 HIV-1 AG W/HIV-1&-2 AB AG IA: CPT | Performed by: SPECIALIST

## 2024-08-20 PROCEDURE — 86695 HERPES SIMPLEX TYPE 1 TEST: CPT | Performed by: SPECIALIST

## 2024-08-20 PROCEDURE — 86593 SYPHILIS TEST NON-TREP QUANT: CPT | Performed by: SPECIALIST

## 2024-08-20 PROCEDURE — 80074 ACUTE HEPATITIS PANEL: CPT | Performed by: SPECIALIST

## 2024-08-20 PROCEDURE — 36415 COLL VENOUS BLD VENIPUNCTURE: CPT | Mod: PO | Performed by: SPECIALIST

## 2024-08-20 NOTE — TELEPHONE ENCOUNTER
----- Message from Sid Page sent at 8/20/2024  2:31 PM CDT -----  Type: Needs Medical Advice  Who Called:  pt  Best Call Back Number: [phone]  Additional Information: pt is calling the office to have her labs discontinued and re-ordered. The pt is trying to get scheduled and cannot as these labs need to be re-ordered. Please call back to advise. Thanks!

## 2024-08-21 LAB
HAV IGM SERPL QL IA: ABNORMAL
HBV CORE IGM SERPL QL IA: ABNORMAL
HBV SURFACE AG SERPL QL IA: ABNORMAL
HCV AB SERPL QL IA: REACTIVE
HIV 1+2 AB+HIV1 P24 AG SERPL QL IA: NORMAL
HSV1 IGG SERPL QL IA: NEGATIVE
HSV2 IGG SERPL QL IA: POSITIVE
TREPONEMA PALLIDUM IGG+IGM AB [PRESENCE] IN SERUM OR PLASMA BY IMMUNOASSAY: NONREACTIVE

## 2024-08-21 NOTE — PROGRESS NOTES
Make follow-up lab appointment per recommendation below.  Check to see if patient has seen the results through my chart.  If not then,  #CALL THE PATIENT# to discuss results/see if they have questions and document verification of contact. Make F/U appt if needed. [phone]    #My interpretation that was sent to them through [employer]:  Sara, I have reviewed your recent blood work.     Your metabolic panel which shows your glucose, kidney function, electrolytes, and liver function is normal.   Your cholesterol is mostly within normal limits.    =========================  Also please address any outstanding health maintenance that may be due: Pneumococcal Vaccines (Age 0-64)(1 of 2 - PCV) Never done  COVID-19 Vaccine(2 - 2023-24 season) due on 09/01/2023

## 2024-08-22 ENCOUNTER — PATIENT MESSAGE (OUTPATIENT)
Dept: OBSTETRICS AND GYNECOLOGY | Facility: CLINIC | Age: 34
End: 2024-08-22
Payer: MEDICAID

## 2024-08-22 DIAGNOSIS — B18.2 HEPATITIS C VIRUS CARRIER STATE: Primary | ICD-10-CM

## 2024-08-23 ENCOUNTER — PATIENT MESSAGE (OUTPATIENT)
Dept: NEUROLOGY | Facility: CLINIC | Age: 34
End: 2024-08-23
Payer: MEDICAID

## 2024-08-23 RX ORDER — VALACYCLOVIR HYDROCHLORIDE 500 MG/1
500 TABLET, FILM COATED ORAL DAILY
Qty: 90 TABLET | Refills: 3 | Status: SHIPPED | OUTPATIENT
Start: 2024-08-23 | End: 2024-11-21

## 2024-09-03 ENCOUNTER — PATIENT MESSAGE (OUTPATIENT)
Dept: NEUROLOGY | Facility: CLINIC | Age: 34
End: 2024-09-03
Payer: MEDICAID

## 2024-09-03 DIAGNOSIS — J42 CHRONIC BRONCHITIS, UNSPECIFIED CHRONIC BRONCHITIS TYPE: ICD-10-CM

## 2024-09-03 RX ORDER — ALBUTEROL SULFATE 90 UG/1
INHALANT RESPIRATORY (INHALATION)
Qty: 6.7 G | OUTPATIENT
Start: 2024-09-03

## 2024-09-03 NOTE — TELEPHONE ENCOUNTER
No care due was identified.  [facility] Embedded Care Due Messages. Reference number: [id number].   9/03/2024 12:54:10 PM CDT

## 2024-09-04 ENCOUNTER — PATIENT MESSAGE (OUTPATIENT)
Dept: FAMILY MEDICINE | Facility: CLINIC | Age: 34
End: 2024-09-04
Payer: MEDICAID

## 2024-09-04 ENCOUNTER — PATIENT MESSAGE (OUTPATIENT)
Dept: OBSTETRICS AND GYNECOLOGY | Facility: CLINIC | Age: 34
End: 2024-09-04
Payer: MEDICAID

## 2024-09-04 DIAGNOSIS — R21 RASH: ICD-10-CM

## 2024-09-04 DIAGNOSIS — Z79.899 ENCOUNTER FOR LONG-TERM (CURRENT) USE OF MEDICATIONS: ICD-10-CM

## 2024-09-04 RX ORDER — BUTALBITAL, ACETAMINOPHEN AND CAFFEINE 50; 325; 40 MG/1; MG/1; MG/1
2 TABLET ORAL DAILY
Qty: 30 TABLET | Refills: 0 | Status: SHIPPED | OUTPATIENT
Start: 2024-09-04 | End: 2024-09-06 | Stop reason: SDUPTHER

## 2024-09-04 RX ORDER — ACYCLOVIR 50 MG/G
OINTMENT TOPICAL
Qty: 15 G | Refills: 1 | OUTPATIENT
Start: 2024-09-04

## 2024-09-04 RX ORDER — ACYCLOVIR 50 MG/G
OINTMENT TOPICAL
Qty: 15 G | Refills: 1 | Status: SHIPPED | OUTPATIENT
Start: 2024-09-04 | End: 2024-09-05 | Stop reason: SDUPTHER

## 2024-09-04 NOTE — TELEPHONE ENCOUNTER
No care due was identified.  [facility] Embedded Care Due Messages. Reference number: [id number].   9/04/2024 10:54:08 AM CDT

## 2024-09-04 NOTE — TELEPHONE ENCOUNTER
Refill Routing Note   Medication(s) are not appropriate for processing by [facility] for the following reason(s):        Outside of protocol    [facility] action(s):  Route             Appointments  past 12m or future 3m with PCP    Date Provider   Last Visit   6/26/2024 Paul Larson MD   Next Visit   Visit date not found Paul Larson MD   ED visits in past 90 days: 0        Note composed:4:06 PM 09/04/2024

## 2024-09-05 ENCOUNTER — PATIENT MESSAGE (OUTPATIENT)
Dept: OBSTETRICS AND GYNECOLOGY | Facility: CLINIC | Age: 34
End: 2024-09-05

## 2024-09-05 ENCOUNTER — OFFICE VISIT (OUTPATIENT)
Dept: OBSTETRICS AND GYNECOLOGY | Facility: CLINIC | Age: 34
End: 2024-09-05
Payer: MEDICAID

## 2024-09-05 ENCOUNTER — LAB VISIT (OUTPATIENT)
Dept: LAB | Facility: HOSPITAL | Age: 34
End: 2024-09-05
Attending: SPECIALIST
Payer: MEDICAID

## 2024-09-05 VITALS
SYSTOLIC BLOOD PRESSURE: 118 MMHG | WEIGHT: 131.38 LBS | DIASTOLIC BLOOD PRESSURE: 84 MMHG | BODY MASS INDEX: 22.55 KG/M2

## 2024-09-05 DIAGNOSIS — R87.810 PAPANICOLAOU SMEAR OF CERVIX WITH POSITIVE HIGH RISK HUMAN PAPILLOMA VIRUS (HPV) TEST: ICD-10-CM

## 2024-09-05 DIAGNOSIS — Z79.899 ENCOUNTER FOR LONG-TERM (CURRENT) USE OF MEDICATIONS: ICD-10-CM

## 2024-09-05 DIAGNOSIS — E28.2 PCOS (POLYCYSTIC OVARIAN SYNDROME): ICD-10-CM

## 2024-09-05 DIAGNOSIS — Z32.00 ENCOUNTER FOR PREGNANCY TEST, RESULT UNKNOWN: Primary | ICD-10-CM

## 2024-09-05 LAB
B-HCG UR QL: NEGATIVE
CTP QC/QA: YES
DHEA-S SERPL-MCNC: 136.7 UG/DL (ref 95.8–511.7)
FSH SERPL-ACNC: 8.12 MIU/ML
LH SERPL-ACNC: 6.3 MIU/ML
PROLACTIN SERPL IA-MCNC: 9.5 NG/ML (ref 5.2–26.5)
TESTOST SERPL-MCNC: 15 NG/DL (ref 5–73)
TSH SERPL DL<=0.005 MIU/L-ACNC: 1.2 UIU/ML (ref 0.4–4)

## 2024-09-05 PROCEDURE — 83516 IMMUNOASSAY NONANTIBODY: CPT | Performed by: SPECIALIST

## 2024-09-05 PROCEDURE — 83001 ASSAY OF GONADOTROPIN (FSH): CPT | Performed by: SPECIALIST

## 2024-09-05 PROCEDURE — 84403 ASSAY OF TOTAL TESTOSTERONE: CPT | Performed by: SPECIALIST

## 2024-09-05 PROCEDURE — 83002 ASSAY OF GONADOTROPIN (LH): CPT | Performed by: SPECIALIST

## 2024-09-05 PROCEDURE — 84402 ASSAY OF FREE TESTOSTERONE: CPT | Performed by: SPECIALIST

## 2024-09-05 PROCEDURE — 99999 PR PBB SHADOW E&M-EST. PATIENT-LVL IV: CPT | Mod: PBBFAC,,, | Performed by: SPECIALIST

## 2024-09-05 PROCEDURE — 82627 DEHYDROEPIANDROSTERONE: CPT | Performed by: SPECIALIST

## 2024-09-05 PROCEDURE — 84443 ASSAY THYROID STIM HORMONE: CPT | Performed by: SPECIALIST

## 2024-09-05 PROCEDURE — 99214 OFFICE O/P EST MOD 30 MIN: CPT | Mod: PBBFAC,PN | Performed by: SPECIALIST

## 2024-09-05 PROCEDURE — 84146 ASSAY OF PROLACTIN: CPT | Performed by: SPECIALIST

## 2024-09-05 PROCEDURE — 36415 COLL VENOUS BLD VENIPUNCTURE: CPT | Mod: PN | Performed by: SPECIALIST

## 2024-09-05 PROCEDURE — 83525 ASSAY OF INSULIN: CPT | Performed by: SPECIALIST

## 2024-09-05 PROCEDURE — 99999PBSHW POCT URINE PREGNANCY: Mod: PBBFAC,,,

## 2024-09-05 PROCEDURE — 57505 ENDOCERVICAL CURETTAGE: CPT | Mod: PBBFAC,PN | Performed by: SPECIALIST

## 2024-09-05 PROCEDURE — 57455 BIOPSY OF CERVIX W/SCOPE: CPT | Mod: PBBFAC,PN | Performed by: SPECIALIST

## 2024-09-05 PROCEDURE — 81025 URINE PREGNANCY TEST: CPT | Mod: PBBFAC,PN | Performed by: SPECIALIST

## 2024-09-05 RX ORDER — ALPRAZOLAM 0.5 MG/1
0.5 TABLET ORAL 2 TIMES DAILY PRN
Qty: 30 TABLET | Refills: 0 | Status: SHIPPED | OUTPATIENT
Start: 2024-09-05 | End: 2024-09-06 | Stop reason: SDUPTHER

## 2024-09-05 RX ORDER — METHYLPHENIDATE HYDROCHLORIDE 20 MG/1
20 TABLET ORAL 3 TIMES DAILY
COMMUNITY
Start: 2024-09-02

## 2024-09-05 NOTE — PROGRESS NOTES
34 yo WF  presents for recommended colposcopic exam following normal pap with pos HR HPV  Neg 16/18  Discussed indications for procedure  Past Medical History:   Diagnosis Date    Abnormal Pap smear     ALLERGIC RHINITIS     Anemia     Anxiety     Bipolar 1 disorder     Chronic back pain     Drug-seeking behavior 2012    Epilepsy     GERD (gastroesophageal reflux disease)     GERD (gastroesophageal reflux disease)     Hepatitis C 2018    Medications given     Opioid dependence 2012    Panic attacks     Pre-eclampsia     PTSD (post-traumatic stress disorder)     Seizures     last episode , EEG-normal;     Urinary tract infection        Past Surgical History:   Procedure Laterality Date    CHOLECYSTECTOMY      LAPAROSCOPIC LIGATION OF FALLOPIAN TUBE Bilateral 2022    Procedure: LIGATION, FALLOPIAN TUBE, LAPAROSCOPIC;  Surgeon: Kristian Landaverde MD;  Location: [facility];  Service: OB/GYN;  Laterality: Bilateral;    mirena removal  2018    PELVIC LAPAROSCOPY      iud retrieval     TUBAL LIGATION      VAGINAL DELIVERY      times 4    vestibulectomy      WISDOM TOOTH EXTRACTION         Family History   Problem Relation Name Age of Onset    Stroke Mother Madelin Louisa Blades     Depression Mother Madelin Louisa Blades         Anxiety depression and heart    Heart disease Mother Madelin Louisa Blades         Heart problems and stroke    Depression Sister Rosalinda         Anxiety depression    Heart disease Maternal Grandmother Binta grayson louisa         Heart issues (murmur)    Early death Sister Kat barclay         Her two daughters (seperate pregnancy stillborn twice)    Breast cancer Neg Hx      Ovarian cancer Neg Hx         Social History     Socioeconomic History    Marital status: Single   Tobacco Use    Smoking status: Every Day     Current packs/day: 0.50     Average packs/day: 0.5 packs/day for 10.0 years (5.0 ttl pk-yrs)     Types: Cigarettes     Passive exposure: Never    Smokeless  tobacco: Never    Tobacco comments:     Trying to quit   Substance and Sexual Activity    Alcohol use: Not Currently     Comment: occasional    Drug use: Not Currently     Types: Hydrocodone     Comment: past histoy last 4/7/2022    Sexual activity: Yes     Partners: Male     Birth control/protection: None     Social Determinants of Health     Financial Resource Strain: Low Risk  (3/3/2020)    Overall Financial Resource Strain (CARDIA)     Difficulty of Paying Living Expenses: Not very hard   Food Insecurity: No Food Insecurity (3/3/2020)    Hunger Vital Sign     Worried About Running Out of Food in the Last Year: Never true     Ran Out of Food in the Last Year: Never true   Transportation Needs: No Transportation Needs (3/3/2020)    PRAPARE - Transportation     Lack of Transportation (Medical): No     Lack of Transportation (Non-Medical): No   Physical Activity: Inactive (3/27/2020)    Exercise Vital Sign     Days of Exercise per Week: 0 days     Minutes of Exercise per Session: 0 min   Stress: Stress Concern Present (3/3/2020)    [place] [employer] - Occupational Stress Questionnaire     Feeling of Stress : Very much       Current Outpatient Medications   Medication Sig Dispense Refill    acyclovir 5% (ZOVIRAX) 5 % ointment Apply topically 5 (five) times daily. 15 g 1    albuterol (PROVENTIL/VENTOLIN HFA) 90 mcg/actuation inhaler Inhale 2 puffs into the lungs every 4 (four) hours as needed for Wheezing. Rescue 18 g 1    albuterol sulfate 2.5 mg/0.5 mL Nebu Take 2.5 mg by nebulization every 6 (six) hours as needed (shortness of breath). Rescue 30 each 0    brivaracetam (BRIVIACT) 100 mg Tab Take 1 tablet (100 mg total) by mouth 2 (two) times daily. 60 tablet 5    busPIRone (BUSPAR) 30 MG Tab Take 1 tablet (30 mg total) by mouth 2 (two) times daily. 60 tablet 1    butalbital-acetaminophen-caffeine -40 mg (FIORICET, ESGIC) -40 mg per tablet Take 2 tablets by mouth once daily. 30  tablet 0    famotidine (PEPCID) 40 MG tablet Take 1 tablet (40 mg total) by mouth once daily. Due for annual with PCP 90 tablet 1    gabapentin (NEURONTIN) 800 MG tablet Take 1 tablet (800 mg total) by mouth 3 (three) times daily. 90 tablet 0    hydrOXYzine pamoate (VISTARIL) 50 MG Cap Take 50 mg by mouth daily as needed.      methylphenidate HCl (RITALIN) 20 MG tablet Take 20 mg by mouth 3 (three) times daily.      nicotine (NICODERM CQ) 21 mg/24 hr Place 1 patch onto the skin once daily. 28 patch 1    ondansetron (ZOFRAN-ODT) 8 MG TbDL Take 1 tablet (8 mg total) by mouth every 6 (six) hours as needed (nausea). 30 tablet 1    pantoprazole (PROTONIX) 40 MG tablet Take 1 tablet (40 mg total) by mouth once daily. Due for annual with PCP 90 tablet 0    QUEtiapine (SEROQUEL) 300 MG Tab Take 300 mg by mouth every evening.      valACYclovir (VALTREX) 1000 MG tablet TAKE 2 PILLS BY MOUTH EVERY TWELVE HOURS FOR 1 DAY AS NEEDED FIRST SIGNS OF RASH 4 tablet 0    valACYclovir (VALTREX) 500 MG tablet Take 1 tablet (500 mg total) by mouth once daily. 90 tablet 3    VITAFOL GUMMIES 3.33 mg iron- 0.33 mg Chew Take 2 tablets by mouth once daily. 180 tablet 1    mupirocin (BACTROBAN) 2 % ointment 2 (two) times daily. apply to affected area (Patient not taking: Reported on 8/15/2024)       No current facility-administered medications for this visit.       Review of patient's allergies indicates:   Allergen Reactions    Darvocet a500 [propoxyphene n-acetaminophen]     Phenytoin sodium extended Other (See Comments)       Review of System:   General: no chills, fever, night sweats, weight gain or weight loss  Psychological: no depression or suicidal ideation  POSITIVE ANXIETY  Breasts: no new or changing breast lumps, nipple discharge or masses.  Respiratory: no cough, shortness of breath, or wheezing  Cardiovascular: no chest pain or dyspnea on exertion  Gastrointestinal: no abdominal pain, change in bowel habits, or black or bloody  stools  Genito-Urinary: no incontinence, urinary frequency/urgency or vulvar/vaginal symptoms, pelvic pain or abnormal vaginal bleeding.  Musculoskeletal: no gait disturbance or muscular weakness       PRE-COLPOSCOPY PROCEDURE COUNSELING:  Discussed the abnormal pap test findings, HPV, need for colposcopy and possible biopsies to determine a diagnosis and plan of care, treatments available, the minimal risks of bleeding and infection with a colposcopy, alternatives to colposcopy and she agrees to proceed.    Transformation zone--------visualized  cervical lesion present--------none  aceto white--------none  punctation---------none  cobblestone--------none    Biopsy position------6 oclock  ECC-------submitted    Specimens placed in formalin and sent to pathology. Monsel's solution was applied at biopsy sites to stop bleeding. The speculum was removed.    POST COLPOSCOPY COUNSELING:   Manage post colposcopy cramping with NSAIDs, Tylenol or Rx per [facility].  Avoid anything in vagina (intercourse, douching, tampons) one week after the procedure.  Expect a clumpy blackish vaginal discharge (Monsel's solution) for several days; Report bleeding heavier than a period, worsening pain, fever > 101.0 F, or foul-smelling vaginal discharge; Stressed importance of follow-up.  Discussed care plan pending biopsy results  In addition, pt c/o episdoic anxiety and I will agree to a one time anxiolytic refill but explained this will need to be followed by PCP or her Psych  Will follow    I spent a total of 30 minutes on the day of the visit. This includes face to face time and non-face to face time preparing to see the patient (eg, review of tests), obtaining and/or reviewing separately obtained history, documenting clinical information in the electronic or other health record, independently interpreting results and communicating results to the patient/family/caregiver, or care coordinator.

## 2024-09-06 LAB
INSULIN COLLECTION INTERVAL: NORMAL
INSULIN SERPL-ACNC: 5.9 UU/ML

## 2024-09-06 RX ORDER — ALPRAZOLAM 0.5 MG/1
0.5 TABLET ORAL 2 TIMES DAILY PRN
Qty: 30 TABLET | Refills: 0 | Status: SHIPPED | OUTPATIENT
Start: 2024-09-06 | End: 2025-09-06

## 2024-09-06 RX ORDER — BUTALBITAL, ACETAMINOPHEN AND CAFFEINE 50; 325; 40 MG/1; MG/1; MG/1
2 TABLET ORAL DAILY
Qty: 30 TABLET | Refills: 0 | Status: SHIPPED | OUTPATIENT
Start: 2024-09-06

## 2024-09-06 NOTE — TELEPHONE ENCOUNTER
Patient states she needs her Rx for Xanax and Fioricet sent to [facility] NOT [facility]--please resend

## 2024-09-09 DIAGNOSIS — J42 CHRONIC BRONCHITIS, UNSPECIFIED CHRONIC BRONCHITIS TYPE: ICD-10-CM

## 2024-09-09 LAB — TESTOST FREE SERPL-MCNC: <0.4 PG/ML

## 2024-09-09 NOTE — TELEPHONE ENCOUNTER
No care due was identified.  [facility] Embedded Care Due Messages. Reference number: [id number].   9/09/2024 2:05:47 PM CDT

## 2024-09-10 RX ORDER — ALBUTEROL SULFATE 90 UG/1
INHALANT RESPIRATORY (INHALATION)
Qty: 6.7 G | OUTPATIENT
Start: 2024-09-10

## 2024-09-10 NOTE — TELEPHONE ENCOUNTER
Refill Decision Note   Sara Chunyne  is requesting a refill authorization.  Brief Assessment and Rationale for Refill:  Quick Discontinue     Medication Therapy Plan:  Denied 9/3/24. Patient needs appointment      Comments:     Note composed:2:54 AM 09/10/2024

## 2024-09-11 ENCOUNTER — PATIENT MESSAGE (OUTPATIENT)
Dept: FAMILY MEDICINE | Facility: CLINIC | Age: 34
End: 2024-09-11
Payer: MEDICAID

## 2024-09-12 LAB — 21HYDROXYLASE AB SER QL: NEGATIVE

## 2024-09-19 DIAGNOSIS — Z79.899 ENCOUNTER FOR LONG-TERM (CURRENT) USE OF MEDICATIONS: ICD-10-CM

## 2024-09-20 RX ORDER — ALPRAZOLAM 0.5 MG/1
0.5 TABLET ORAL 2 TIMES DAILY PRN
Qty: 30 TABLET | Refills: 0 | OUTPATIENT
Start: 2024-09-20 | End: 2025-09-20

## 2024-09-20 RX ORDER — METRONIDAZOLE 250 MG/1
250 TABLET ORAL EVERY 8 HOURS
Qty: 21 TABLET | Refills: 0 | Status: SHIPPED | OUTPATIENT
Start: 2024-09-20 | End: 2024-09-27

## 2024-09-20 RX ORDER — BUTALBITAL, ACETAMINOPHEN AND CAFFEINE 50; 325; 40 MG/1; MG/1; MG/1
2 TABLET ORAL DAILY
Qty: 30 TABLET | Refills: 0 | OUTPATIENT
Start: 2024-09-20

## 2024-09-20 RX ORDER — METRONIDAZOLE 250 MG/1
250 TABLET ORAL EVERY 8 HOURS
Qty: 21 TABLET | Refills: 0 | OUTPATIENT
Start: 2024-09-20 | End: 2024-09-27

## 2024-09-23 ENCOUNTER — TELEPHONE (OUTPATIENT)
Dept: NEUROLOGY | Facility: CLINIC | Age: 34
End: 2024-09-23
Payer: MEDICAID

## 2024-09-23 NOTE — TELEPHONE ENCOUNTER
Pt. Needed to reschedule appointment.Offered several choices. Scheduled with Leslie Apple NP. Ok per Dr. Lazcano.

## 2024-09-25 ENCOUNTER — PATIENT MESSAGE (OUTPATIENT)
Dept: OBSTETRICS AND GYNECOLOGY | Facility: CLINIC | Age: 34
End: 2024-09-25
Payer: MEDICAID

## 2024-09-25 DIAGNOSIS — Z79.899 ENCOUNTER FOR LONG-TERM (CURRENT) USE OF MEDICATIONS: ICD-10-CM

## 2024-09-25 RX ORDER — ALPRAZOLAM 0.5 MG/1
0.5 TABLET ORAL 2 TIMES DAILY PRN
Qty: 30 TABLET | Refills: 0 | OUTPATIENT
Start: 2024-09-25 | End: 2025-09-25

## 2024-09-25 RX ORDER — BUTALBITAL, ACETAMINOPHEN AND CAFFEINE 50; 325; 40 MG/1; MG/1; MG/1
2 TABLET ORAL DAILY
Qty: 30 TABLET | Refills: 0 | OUTPATIENT
Start: 2024-09-25

## 2024-09-25 NOTE — TELEPHONE ENCOUNTER
Refill Routing Note   Medication(s) are not appropriate for processing by [facility] for the following reason(s):        Outside of protocol    [facility] action(s):  Route               Appointments  past 12m or future 3m with PCP    Date Provider   Last Visit   9/5/2024 Kristian Landaverde MD   Next Visit   Visit date not found Kristian Landaverde MD   ED visits in past 90 days: 0        Note composed:3:03 PM 09/25/2024

## 2024-10-24 ENCOUNTER — TELEPHONE (OUTPATIENT)
Dept: NEUROLOGY | Facility: CLINIC | Age: 34
End: 2024-10-24
Payer: MEDICAID

## 2024-10-24 NOTE — LETTER
October 24, 2024    Sara Sanford  [address] [place] [address]             [facility] Neurology  [address]  [facility] [address]  Phone: [phone]  Fax: [phone] Dear Ms. Sanford:      We are sorry that you missed your appointment with our Neurology Dept on 10/24/24. This time was allotted especially for you. We currently have patients waiting for available appointments to establish care and treatment. Patient care is important to us. In order to receive quality care, all appointments must be kept.    Appointments may be cancelled by calling the Neurology phone line at [phone] between 8:00 a.m. and 5:00 p.m., Monday through Friday, at least 24 hours in advance, as per departmental policy.    Multiple no show appointments and/or cancellations less than 24 hours' notice may result in dismissal from our practice. Please call our office as soon as possible so that we may reschedule your appointment. If you have already rescheduled your appointment, please disregard this letter.      If you have any questions or concerns, please don't hesitate to call.    Sincerely,        Julieta Lazcano MD Kathleen Singh, NP

## 2024-11-19 ENCOUNTER — PATIENT MESSAGE (OUTPATIENT)
Dept: FAMILY MEDICINE | Facility: CLINIC | Age: 34
End: 2024-11-19
Payer: MEDICAID

## 2024-11-19 ENCOUNTER — TELEPHONE (OUTPATIENT)
Dept: NEUROLOGY | Facility: CLINIC | Age: 34
End: 2024-11-19
Payer: MEDICAID

## 2024-11-19 DIAGNOSIS — R21 RASH: ICD-10-CM

## 2024-11-19 DIAGNOSIS — J42 CHRONIC BRONCHITIS, UNSPECIFIED CHRONIC BRONCHITIS TYPE: ICD-10-CM

## 2024-11-19 DIAGNOSIS — Z79.899 ENCOUNTER FOR LONG-TERM (CURRENT) USE OF MEDICATIONS: ICD-10-CM

## 2024-11-19 DIAGNOSIS — F17.200 TOBACCO DEPENDENCE SYNDROME: Chronic | ICD-10-CM

## 2024-11-19 RX ORDER — IBUPROFEN 200 MG
1 TABLET ORAL DAILY
Qty: 28 PATCH | Refills: 1 | Status: SHIPPED | OUTPATIENT
Start: 2024-11-19

## 2024-11-19 RX ORDER — ALBUTEROL SULFATE 90 UG/1
2 INHALANT RESPIRATORY (INHALATION) EVERY 4 HOURS PRN
Qty: 54 G | Refills: 1 | Status: SHIPPED | OUTPATIENT
Start: 2024-11-19

## 2024-11-19 RX ORDER — PNV 112/IRON/FOLIC/OM3/DHA/EPA 3.33-.33MG
2 TABLET,CHEWABLE ORAL DAILY
Qty: 180 TABLET | Refills: 1 | Status: SHIPPED | OUTPATIENT
Start: 2024-11-19

## 2024-11-19 RX ORDER — VALACYCLOVIR HYDROCHLORIDE 1 G/1
TABLET, FILM COATED ORAL
Qty: 4 TABLET | Refills: 0 | Status: SHIPPED | OUTPATIENT
Start: 2024-11-19

## 2024-11-19 NOTE — TELEPHONE ENCOUNTER
ISRRAELM for pt to call back to confirm seizure follow up appointment scheduled 1/23/25 at 1:30pm with Dr. Lazcano.

## 2024-11-19 NOTE — TELEPHONE ENCOUNTER
----- Message from Mikaela sent at 11/19/2024  8:26 AM CST -----  Regarding: Sooner Appointment Request  Contact: patient at [phone]  Type:  Sooner Appointment Request    Name of Caller:  patient at [phone]    When is the first available appointment?  none  Symptoms:  annual  Additional Information:  patient would like to schedule for 12/05. Please call and advise. Thank you

## 2024-11-19 NOTE — TELEPHONE ENCOUNTER
----- Message from Mikaela sent at 11/19/2024  8:24 AM CST -----  Regarding: Sooner Appointment Request  Contact: patient at [phone]  Type:  Sooner Appointment Request    Name of Caller:  patient at [phone]    When is the first available appointment?  none  Symptoms:  annual follow up    Additional Information:  Please call and advise. Thank you

## 2024-11-19 NOTE — TELEPHONE ENCOUNTER
Care Due:                  Date            Visit Type   Department     Provider  --------------------------------------------------------------------------------                                EP -                              PRIMARY      [facility] FAMILY  Last Visit: 03-      CARE ([facility])   MEDICINE       Paul Larson  Next Visit: None Scheduled  None         None Found                                                            Last  Test          Frequency    Reason                     Performed    Due Date  --------------------------------------------------------------------------------    Office Visit  12 months..  albuterol, busPIRone,      03- 02-                             nicotine, pantoprazole...    [employer] Embedded Care Due Messages. Reference number: [id number].   11/19/2024 8:39:27 AM CST

## 2024-11-19 NOTE — TELEPHONE ENCOUNTER
Refill Routing Note   Medication(s) are not appropriate for processing by [facility] for the following reason(s):        Outside of protocol  Required labs outdated  Required vitals outdated    [facility] action(s):  Defer  Route   Requires appointment : Yes               Appointments  past 12m or future 3m with PCP    Date Provider   Last Visit   6/26/2024 Paul Larson MD   Next Visit   11/19/2024 Paul Larson MD   ED visits in past 90 days: 0        Note composed:11:18 AM 11/19/2024

## 2024-11-19 NOTE — TELEPHONE ENCOUNTER
Refill Routing Note   Medication(s) are not appropriate for processing by [facility] for the following reason(s):        Patient not seen by provider within 15 months    [facility] action(s):  Defer        Medication Therapy Plan: Labs waived for PRN meds (VALACYCLOVIR)      Appointments  past 12m or future 3m with PCP    Date Provider   Last Visit   6/26/2024 Paul Larson MD   Next Visit   Visit date not found Paul Larson MD   ED visits in past 90 days: 0        Note composed:11:09 AM 11/19/2024

## 2024-11-29 ENCOUNTER — PATIENT MESSAGE (OUTPATIENT)
Dept: FAMILY MEDICINE | Facility: CLINIC | Age: 34
End: 2024-11-29
Payer: MEDICAID

## 2024-12-27 ENCOUNTER — TELEPHONE (OUTPATIENT)
Dept: NEUROLOGY | Facility: CLINIC | Age: 34
End: 2024-12-27
Payer: MEDICAID

## 2024-12-30 DIAGNOSIS — R21 RASH: ICD-10-CM

## 2024-12-30 NOTE — TELEPHONE ENCOUNTER
Care Due:                  Date            Visit Type   Department     Provider  --------------------------------------------------------------------------------                                EP -                              PRIMARY      [facility] FAMILY  Last Visit: 03-      CARE ([facility])   MEDICINE       Paul Larson  Next Visit: None Scheduled  None         None Found                                                            Last  Test          Frequency    Reason                     Performed    Due Date  --------------------------------------------------------------------------------    CBC.........  12 months..  valACYclovir.............  Not Found    Overdue    [employer] Embedded Care Due Messages. Reference number: [id number].   12/30/2024 10:40:22 AM CST

## 2025-01-02 RX ORDER — VALACYCLOVIR HYDROCHLORIDE 1 G/1
TABLET, FILM COATED ORAL
Qty: 4 TABLET | Refills: 0 | Status: SHIPPED | OUTPATIENT
Start: 2025-01-02

## 2025-01-02 NOTE — TELEPHONE ENCOUNTER
Refill Routing Note   Medication(s) are not appropriate for processing by [facility] for the following reason(s):        Patient not seen by provider within 15 months  Required labs outdated    [facility] action(s):  Route  Defer             Appointments  past 12m or future 3m with PCP    Date Provider   Last Visit   6/26/2024 Paul Larson MD   Next Visit   Visit date not found Paul Larson MD   ED visits in past 90 days: 0        Note composed:10:58 AM 01/02/2025

## 2025-01-15 ENCOUNTER — TELEPHONE (OUTPATIENT)
Dept: FAMILY MEDICINE | Facility: CLINIC | Age: 35
End: 2025-01-15
Payer: MEDICAID

## 2025-01-15 ENCOUNTER — PATIENT MESSAGE (OUTPATIENT)
Dept: FAMILY MEDICINE | Facility: CLINIC | Age: 35
End: 2025-01-15
Payer: MEDICAID

## 2025-01-15 NOTE — TELEPHONE ENCOUNTER
----- Message from Lizzeth sent at 1/15/2025  1:58 PM CST -----  Contact: DENISE ROY [[id number]]  .Type:  Patient Requesting Call    Who Called:DENISE ROY [[id number]]  Does the patient know what this is regarding?:Patient called in requesting an annual appointment  Would the patient rather a call back or a [facility]? Call back   Best Call Back Number:.[phone] (home)    Additional Information: No Wednesday and Tuesday/Thursdays no afternoon only

## 2025-01-21 ENCOUNTER — TELEPHONE (OUTPATIENT)
Dept: NEUROLOGY | Facility: CLINIC | Age: 35
End: 2025-01-21
Payer: MEDICAID

## 2025-01-21 NOTE — TELEPHONE ENCOUNTER
----- Message from Mani sent at 1/21/2025  2:40 PM CST -----  Regarding: appt  Contact: DENISE ROY [[id number]]  Type:  Sooner Appointment Request    Caller is requesting a sooner appointment.      Name of Caller:  Binta, grandmother    When is the first available appointment?  Dept book-Weather    Symptoms:  f/u-SZ    Would the patient rather a call back or a response via [facility]? Call    Best Call Back Number:  [phone]    Additional Information:  Please call to advise.

## 2025-01-21 NOTE — TELEPHONE ENCOUNTER
Spoke with the pt grandmother, appt confirmed for Thursday Jan 23rd and vv Feb 6th. They will call if roads do not allow them to travel Thursday,.

## 2025-01-24 ENCOUNTER — PATIENT MESSAGE (OUTPATIENT)
Dept: URGENT CARE | Facility: CLINIC | Age: 35
End: 2025-01-24
Payer: MEDICAID

## 2025-01-29 ENCOUNTER — TELEPHONE (OUTPATIENT)
Dept: NEUROLOGY | Facility: CLINIC | Age: 35
End: 2025-01-29
Payer: MEDICAID

## 2025-01-29 DIAGNOSIS — Z79.899 ENCOUNTER FOR LONG-TERM (CURRENT) USE OF MEDICATIONS: ICD-10-CM

## 2025-01-29 NOTE — LETTER
January 29, 2025    Sara Chunyne  [address] [place] [address]             [facility]  [address]  [facility] [address]  Phone: [phone]  Fax: [phone] Dear Ms. Sanford:      We are sorry that you missed your appointment with our Neurology Dept on 01/23/2025. This time was allotted especially for you. We currently have patients waiting for available appointments to establish care and treatment. Patient care is important to us. In order to receive quality care, all appointments must be kept.     Appointments may be cancelled by calling the Neurology phone line at [phone] between 8:00 a.m. and 5:00 p.m., Monday through Friday, at least 24 hours in advance, as per departmental policy.     Multiple no show appointments and/or cancellations less than 24 hours notice may result in dismissal from our practice. Please call our office as soon as possible so that we may reschedule your appointment. If you have already rescheduled your appointment, please disregard this letter.         Sincerely,        Julieta Lazcano MD

## 2025-01-29 NOTE — TELEPHONE ENCOUNTER
Called patient to discuss 2/6/25 virtual appointment scheduled with Dr. Lazcano. Due to patient being on Briviact, she must be seen in person and last appointment was 1/23/23. No answer. Left detailed message that 2/6/25 virtual appointment will be cancelled and to call the clinic to be scheduled for in person appointment. Portal message was sent explaining this on 12/27/24. Sending another portal message today. Patient was a no show on 1/23/25 for an in person appointment. Second no show letter sent today also.

## 2025-01-29 NOTE — LETTER
January 29, 2025    Sara Sanford  [address] [place] [address]             [facility]  [address]  [facility] [address]  Phone: [phone]  Fax: [phone] Dear Ms. Sanford:      We are sorry that you missed your appointment with our Neurology Dept on 01/23/2025. This time was allotted especially for you. We currently have patients waiting for available appointments to establish care and treatment. Patient care is important to us. In order to receive quality care, all appointments must be kept.     Appointments may be cancelled by calling the Neurology phone line at [phone] between 8:00 a.m. and 5:00 p.m., Monday through Friday, at least 24 hours in advance, as per departmental policy.     Multiple no show appointments and/or cancellations less than 24 hours notice may result in dismissal from our practice. Please call our office as soon as possible so that we may reschedule your appointment. If you have already rescheduled your appointment, please disregard this letter.         If you have any questions or concerns, please don't hesitate to call.    Sincerely,        Julieta Lazcano MD

## 2025-01-30 RX ORDER — BUTALBITAL, ACETAMINOPHEN AND CAFFEINE 50; 325; 40 MG/1; MG/1; MG/1
2 TABLET ORAL DAILY
Qty: 30 TABLET | Refills: 0 | Status: SHIPPED | OUTPATIENT
Start: 2025-01-30

## 2025-01-30 NOTE — TELEPHONE ENCOUNTER
Refill Routing Note   Medication(s) are not appropriate for processing by [facility] for the following reason(s):        Outside of protocol    [facility] action(s):  Route               Appointments  past 12m or future 3m with PCP    Date Provider   Last Visit   9/5/2024 Kristian Landaverde MD   Next Visit   Visit date not found Kristian Landaverde MD   ED visits in past 90 days: 0        Note composed:5:34 AM 01/30/2025

## 2025-02-04 ENCOUNTER — TELEPHONE (OUTPATIENT)
Dept: FAMILY MEDICINE | Facility: CLINIC | Age: 35
End: 2025-02-04
Payer: MEDICAID

## 2025-02-04 ENCOUNTER — TELEPHONE (OUTPATIENT)
Dept: NEUROLOGY | Facility: CLINIC | Age: 35
End: 2025-02-04
Payer: MEDICAID

## 2025-02-04 NOTE — TELEPHONE ENCOUNTER
----- Message from Willie Marquez sent at 2/4/2025  4:28 PM CST -----  Contact: Binta  Type:  Patient Returning Call    Who Called: Binta  Who Left Message for Patient: Nurse  Does the patient know what this is regarding?: Scheduling  Would the patient rather a call back or a response via [facility]?  Call  Best Call Back Number: [phone]  Additional Information:

## 2025-02-04 NOTE — TELEPHONE ENCOUNTER
----- Message from [employer] sent at 2/4/2025 12:58 PM CST -----  Contact: brynn/grandma  Type:  Sooner Apoointment Request    Caller is requesting a sooner appointment.  Caller declined first available appointment listed below.  Caller will not accept being placed on the waitlist and is requesting a message be sent to doctor.  Name of Caller:brynn   When is the first available appointment? None populating   Symptoms: annual   Would the patient rather a call back or a response via [facility]?  call  Best Call Back Number:    Additional Information:

## 2025-02-04 NOTE — TELEPHONE ENCOUNTER
----- Message from July sent at 2/4/2025  1:18 PM CST -----  Contact: [phone]  Type:  Patient Returning Call    Who Called:DENISE ROY [[id number]]  Who Left Message for Patient:Ajay Dubon MA  Does the patient know what this is regarding?:missed a call from your office  Would the patient rather a call back or a response via [facility]? Call back  Best Call Back Number: [phone]  Additional Information: [id number]

## 2025-02-04 NOTE — TELEPHONE ENCOUNTER
----- Message from Halina sent at 2/4/2025 12:51 PM CST -----  Regarding: appointment  Contact: Binta antunez  Type:  Sooner Appointment Request    Caller is requesting a sooner appointment.  Caller declined first available appointment listed below.  Caller will not accept being placed on the waitlist and is requesting a message be sent to doctor.    Name of Caller:  Binta antunez  When is the first available appointment?    Symptoms:  epilepsy  Would the patient rather a call back or a response via [facility]? call  Best Call Back Number:  [phone]    Additional Information:  Please call grand mother to advise.  Thanks!

## 2025-02-04 NOTE — TELEPHONE ENCOUNTER
Spoke with pt's grandmother. Appt scheduled 2/24/25 at 1:00pm with Leslie Apple NP. Patient's grandma is aware that appt is with Leslie and not Dr. Lazcano. Explained to her that Dr. Lazcano schedule is booked and Dr. Lazcano is ok with pt seeing NP to continue to have her medication refilled.

## 2025-02-06 ENCOUNTER — OFFICE VISIT (OUTPATIENT)
Dept: FAMILY MEDICINE | Facility: CLINIC | Age: 35
End: 2025-02-06
Payer: MEDICAID

## 2025-02-06 DIAGNOSIS — G43.709 CHRONIC MIGRAINE WITHOUT AURA WITHOUT STATUS MIGRAINOSUS, NOT INTRACTABLE: ICD-10-CM

## 2025-02-06 DIAGNOSIS — Z79.899 ENCOUNTER FOR LONG-TERM (CURRENT) USE OF MEDICATIONS: ICD-10-CM

## 2025-02-06 DIAGNOSIS — R76.8 HEPATITIS C ANTIBODY TEST POSITIVE: ICD-10-CM

## 2025-02-06 DIAGNOSIS — F17.210 CIGARETTE SMOKER: ICD-10-CM

## 2025-02-06 DIAGNOSIS — B19.10 HEPATITIS B INFECTION WITHOUT DELTA AGENT WITHOUT HEPATIC COMA, UNSPECIFIED CHRONICITY: ICD-10-CM

## 2025-02-06 DIAGNOSIS — J42 CHRONIC BRONCHITIS, UNSPECIFIED CHRONIC BRONCHITIS TYPE: ICD-10-CM

## 2025-02-06 DIAGNOSIS — Z76.0 MEDICATION REFILL: Primary | ICD-10-CM

## 2025-02-06 DIAGNOSIS — K21.9 GASTROESOPHAGEAL REFLUX DISEASE WITHOUT ESOPHAGITIS: ICD-10-CM

## 2025-02-06 DIAGNOSIS — Z76.89 REFERRAL OF PATIENT: ICD-10-CM

## 2025-02-06 PROBLEM — Z30.2 STERILIZATION: Status: RESOLVED | Noted: 2020-03-03 | Resolved: 2025-02-06

## 2025-02-06 PROBLEM — R05.9 COUGH: Chronic | Status: RESOLVED | Noted: 2020-03-03 | Resolved: 2025-02-06

## 2025-02-06 PROCEDURE — 1159F MED LIST DOCD IN RCRD: CPT | Mod: CPTII,95,, | Performed by: NURSE PRACTITIONER

## 2025-02-06 PROCEDURE — 98007 SYNCH AUDIO-VIDEO EST HI 40: CPT | Mod: 95,,, | Performed by: NURSE PRACTITIONER

## 2025-02-06 PROCEDURE — 1160F RVW MEDS BY RX/DR IN RCRD: CPT | Mod: CPTII,95,, | Performed by: NURSE PRACTITIONER

## 2025-02-06 RX ORDER — PANTOPRAZOLE SODIUM 40 MG/1
40 TABLET, DELAYED RELEASE ORAL DAILY
Qty: 90 TABLET | Refills: 3 | Status: SHIPPED | OUTPATIENT
Start: 2025-02-06

## 2025-02-06 RX ORDER — FAMOTIDINE 40 MG/1
40 TABLET, FILM COATED ORAL DAILY
Qty: 90 TABLET | Refills: 3 | Status: SHIPPED | OUTPATIENT
Start: 2025-02-06

## 2025-02-06 RX ORDER — FAMOTIDINE 40 MG/1
40 TABLET, FILM COATED ORAL DAILY
Qty: 90 TABLET | Refills: 3 | Status: SHIPPED | OUTPATIENT
Start: 2025-02-06 | End: 2025-02-06

## 2025-02-06 RX ORDER — BUTALBITAL, ACETAMINOPHEN AND CAFFEINE 50; 325; 40 MG/1; MG/1; MG/1
2 TABLET ORAL DAILY
Qty: 30 TABLET | Refills: 0 | Status: SHIPPED | OUTPATIENT
Start: 2025-02-06 | End: 2025-02-28 | Stop reason: SDUPTHER

## 2025-02-06 RX ORDER — PANTOPRAZOLE SODIUM 40 MG/1
40 TABLET, DELAYED RELEASE ORAL DAILY
Qty: 90 TABLET | Refills: 3 | Status: SHIPPED | OUTPATIENT
Start: 2025-02-06 | End: 2025-02-06

## 2025-02-06 RX ORDER — VALACYCLOVIR HYDROCHLORIDE 1 G/1
TABLET, FILM COATED ORAL
Qty: 10 TABLET | Refills: 0 | Status: SHIPPED | OUTPATIENT
Start: 2025-02-06 | End: 2025-02-28 | Stop reason: SDUPTHER

## 2025-02-06 RX ORDER — VALACYCLOVIR HYDROCHLORIDE 1 G/1
TABLET, FILM COATED ORAL
Qty: 10 TABLET | Refills: 0 | Status: SHIPPED | OUTPATIENT
Start: 2025-02-06 | End: 2025-02-06

## 2025-02-06 NOTE — PROGRESS NOTES
Primary Care Telemedicine Note  The patient location is:  Patient's Home - [place]  The chief complaint leading to consultation is:   Chief Complaint   Patient presents with    Medication Refill      Total time:  see MDM below. The total time spent on the encounter, which includes face to face time and non-face to face time preparing to see the patient (eg, review of previous medical records, tests), Obtaining and/or reviewing separately obtained history, documenting clinical information in the electronic or other health record, independently interpreting results (not separately reported)/communicating results to the patient/family/caregiver, and/or care coordination (not separately reported).    Visit type: Virtual visit with synchronous audio only and video  Each patient to whom he or she provides medical services by telemedicine is:  (1) informed of the relationship between the physician and patient and the respective role of any other health care provider with respect to management of the patient; and (2) notified that he or she may decline to receive medical services by telemedicine and may withdraw from such care at any time.  =================================================================    Assessment/Plan:  Problem List Items Addressed This Visit          Neuro    Chronic migraine without aura without status migrainosus, not intractable    Overview     Chronic problem. Uncontrolled. Currently on Fioricet with relief reported, requesting refills today.   She has failed several medications in the past:   Past meds:  Dilantin  Topiramate  Depakote  Lamictal  Phenobarbital  Tripitans         Current Assessment & Plan     Temp refill. She has an appt with neurology at the end of the month. Will need to follow up with neurology for additional refills/management.             Psychiatric    Encounter for long-term (current) use of medications (Chronic)    Overview     Patient is on CHRONIC long-term drug  therapy for managed conditions. See medication list. Reports compliance.  No side effects reported.  Routine lab work is being monitored.  Patient does need refills today. Reviewed labs.     Lab Results   Component Value Date    WBC 7.04 05/20/2022    HGB 12.6 05/20/2022    HCT 37.5 05/20/2022    MCV 90 05/20/2022     05/20/2022         Chemistry        Component Value Date/Time     08/19/2024 1009    K 4.2 08/19/2024 1009     08/19/2024 1009    CO2 26 08/19/2024 1009    BUN 15 08/19/2024 1009    CREATININE 0.9 08/19/2024 1009    GLU 88 08/19/2024 1009        Component Value Date/Time    CALCIUM 9.5 08/19/2024 1009    ALKPHOS 65 08/19/2024 1009    AST 16 08/19/2024 1009    ALT 14 08/19/2024 1009    BILITOT 0.3 08/19/2024 1009    ESTGFRAFRICA >60.0 04/09/2019 0903    EGFRNONAA >60.0 04/09/2019 0903          Lab Results   Component Value Date    TSH 1.200 09/05/2024    FREET4 1.31 03/04/2010            Current Assessment & Plan     Complete history and physical was completed today.  Complete and thorough medication reconciliation was performed.  Discussed risks and benefits of medications.  Advised patient on orders and health maintenance.  We discussed old records and old labs if available.  Will request any records not available through epic.  Continue current medications listed on your summary sheet.            Pulmonary    Chronic bronchitis    Overview     Chronic problem. Intermittent control. Reports recurrent flares. c/o occasional cough, wheezing, and SOB. She is using albuterol inhaler with some relief. She is a current everyday cigarette smoker. She does not see pulmonology. She would like referral to see specialist. She expresses concerns that her s/s may be linked to a CFG mutation discovered during her fifth pregnancy    X-Ray Chest PA And Lateral  Narrative: EXAMINATION:  XR CHEST PA AND LATERAL    CLINICAL HISTORY:  Bronchitis, not specified as acute or chronic    TECHNIQUE:  PA and  lateral views of the chest were performed.    COMPARISON:  04/10/2014    FINDINGS:  Heart size, pulmonary vessels, and mediastinal contours are normal.  Lungs are clear.  No pleural effusion or pneumothorax are identified.  Skeletal structures are intact.  There has been no significant change.  Impression: No radiographic evidence of active chest disease.    Electronically signed by: Miguel A Dixon MD  Date:    11/04/2022  Time:    09:19           Current Assessment & Plan     Pulmonology referral placed. Recommend smoking cessation. Continue inhalers. Avoid triggers. She would like referral to genetic as well.          Relevant Orders    Ambulatory referral/consult to Pulmonology       GI    Gastroesophageal reflux disease without esophagitis    Overview     Chronic. Stable on daily protonix and pepcid. Denies alarm symptoms, such as dysphagia, weight loss, chest pain, melena, hematemesis, or N/V         Current Assessment & Plan     Medications refilled.  Please be advised of condition course.  - Take PPI in the morning 30-60 minutes before breakfast  - I recommend ongoing Education for lifestyle modifications to help control/reduce reflux/abdominal pain including: avoid large meals, avoid eating within 2-3 hours of bedtime (avoid late night eating & lying down soon after eating), elevate head of bed if nocturnal symptoms are present, smoking cessation (if current smoker), & weight loss (if overweight).   - please be advised to avoid known foods which trigger reflux symptoms & to minimize/avoid high-fat foods, chocolate, caffeine, citrus, alcohol, & tomato products.  - Advised to avoid/limit use of NSAID's, since they can cause GI upset, bleeding, and/or ulcers. If needed, take with food.          Relevant Medications    famotidine (PEPCID) 40 MG tablet    pantoprazole (PROTONIX) 40 MG tablet    Hepatitis B infection without delta agent without hepatic coma    Overview     Positive hepatitis labs at recent ER  visit.         Current Assessment & Plan     Referral to infectious disease.         Relevant Orders    Ambulatory referral/consult to Infectious Disease    Ambulatory referral/consult to Infectious Disease    Hepatitis C antibody test positive    Overview     Chronic.  Patient had treatment for hepatitis-C and had a curative test however she has had possible exposure since then and has been tested again for hepatitis-C with antibody which we discussed will be positive for the rest of her life.         Current Assessment & Plan     Referral to infectious disease and hepatology for assistance with repeat treatment if needed.         Relevant Orders    Ambulatory referral/consult to Infectious Disease    Ambulatory referral/consult to Infectious Disease       Palliative Care    Referral of patient    Overview     She expresses concerns her respiratory issues may be linked to a CFG mutation discovered during her fifth pregnancy. She is seeking genetic counseling, which was initially recommended during pregnancy but not pursued. She wants to understand the implications of her cystic fibrosis gene mutation for herself and potential transmission to her children, as well as explore other potential genetic factors.    She has five children aged 2.5 to 19 years. Her youngest is being evaluated for autism.         Current Assessment & Plan     Referral to genetics placed for further evaluation.          Relevant Orders    Ambulatory referral/consult to Genetics       Other    Cigarette smoker    Overview     -chronic problem. Current every day cigarette smoker, 0.5 PPD  -the patient was counseled on the dangers of tobacco use  -reviewed strategies to maximize success, including counseling, nicotine replacement therapy and pharmacologic option. She would like to try nicotine patches.   -tobacco cessation class offered. Referral to smoking cessation placed.     Time spent: 3-10 minutes           Other Visit Diagnoses        Medication refill    -  Primary    Relevant Medications    valACYclovir (VALTREX) 1000 MG tablet    butalbital-acetaminophen-caffeine -40 mg (FIORICET, ESGIC) -40 mg per tablet          Follow up in about 3 months (around 5/6/2025), or if symptoms worsen or fail to improve, for with PCP as scheduled.  ER precautions for severe or worsening symptoms.     Sendy Mcrae NP  _____________________________________________________________________________________________________________________________________________________    CC: refills     HPI: Patient is a 34-year-old female who presents virtually today as an established patient here for medication refills.     RESPIRATORY:  She has a history of chronic bronchitis with previous recommendations to see a pulmonologist. Her respiratory issues may be linked to a CFG mutation discovered during her fifth pregnancy.    GENETIC CONCERNS:  She is seeking genetic counseling, which was initially recommended during pregnancy but not pursued. She wants to understand the implications of her cystic fibrosis gene mutation for herself and potential transmission to her children, as well as explore other potential genetic factors.    GERD:  She takes Protonix and Pepcid for GERD management. Symptoms are stable.     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 She is requesting refill on Valtrex.     MEDICAL HISTORY:  She has a history of hepatitis C and hepatitis B, with previous referral to Infectious Disease for treatment options. She has not followed up.     FAMILY HISTORY:  She has five children aged 2.5 to 19 years. Her youngest is being evaluated for autism.    Past Medical History:  Past Medical History:   Diagnosis Date    Abnormal Pap smear     ALLERGIC RHINITIS     Anemia     Anxiety     Bipolar 1 disorder     Chronic back pain     Drug-seeking behavior 04/16/2012    Epilepsy     GERD (gastroesophageal reflux disease)     GERD  (gastroesophageal reflux disease)     Hepatitis C 2018    Medications given     Opioid dependence 04/16/2012    Panic attacks     Pre-eclampsia     PTSD (post-traumatic stress disorder)     Seizures     last episode 2020, EEG-normal;     Urinary tract infection      Past Surgical History:   Procedure Laterality Date    CHOLECYSTECTOMY      LAPAROSCOPIC LIGATION OF FALLOPIAN TUBE Bilateral 05/23/2022    Procedure: LIGATION, FALLOPIAN TUBE, LAPAROSCOPIC;  Surgeon: Kristian Landaverde MD;  Location: [facility];  Service: OB/GYN;  Laterality: Bilateral;    mirena removal  02/2018    PELVIC LAPAROSCOPY      iud retrieval     TUBAL LIGATION      VAGINAL DELIVERY      times 4    vestibulectomy      WISDOM TOOTH EXTRACTION       Review of patient's allergies indicates:   Allergen Reactions    Darvocet a500 [propoxyphene n-acetaminophen]     Phenytoin sodium extended Other (See Comments)     Social History     Tobacco Use    Smoking status: Every Day     Current packs/day: 0.50     Average packs/day: 0.5 packs/day for 10.0 years (5.0 ttl pk-yrs)     Types: Cigarettes     Passive exposure: Never    Smokeless tobacco: Never    Tobacco comments:     Trying to quit   Substance Use Topics    Alcohol use: Not Currently     Comment: occasional    Drug use: Not Currently     Types: Hydrocodone     Comment: past histoy last 4/7/2022     Family History   Problem Relation Name Age of Onset    Stroke Mother Madelin Luoisa Blades     Depression Mother Madelin Louisa Blades         Anxiety depression and heart    Heart disease Mother Madelin Louisa Blades         Heart problems and stroke    Depression Sister Rosalinda         Anxiety depression    Heart disease Maternal Grandmother Binta grayson louisa         Heart issues (murmur)    Early death Sister Kat barclay         Her two daughters (seperate pregnancy stillborn twice)    Breast cancer Neg Hx      Ovarian cancer Neg Hx       Current Outpatient Medications on File Prior to Visit    Medication Sig Dispense Refill    acyclovir 5% (ZOVIRAX) 5 % ointment Apply topically 5 (five) times daily. 15 g 1    albuterol (PROVENTIL/VENTOLIN HFA) 90 mcg/actuation inhaler Inhale 2 puffs into the lungs every 4 (four) hours as needed for Wheezing. Rescue 54 g 1    albuterol sulfate 2.5 mg/0.5 mL Nebu Take 2.5 mg by nebulization every 6 (six) hours as needed (shortness of breath). Rescue 30 each 0    ALPRAZolam (XANAX) 0.5 MG tablet Take 1 tablet (0.5 mg total) by mouth 2 (two) times daily as needed for Insomnia or Anxiety. 30 tablet 0    brivaracetam (BRIVIACT) 100 mg Tab Take 1 tablet (100 mg total) by mouth 2 (two) times daily. 60 tablet 5    busPIRone (BUSPAR) 30 MG Tab Take 1 tablet (30 mg total) by mouth 2 (two) times daily. 60 tablet 1    gabapentin (NEURONTIN) 800 MG tablet Take 1 tablet (800 mg total) by mouth 3 (three) times daily. 90 tablet 0    hydrOXYzine pamoate (VISTARIL) 50 MG Cap Take 50 mg by mouth daily as needed.      methylphenidate HCl (RITALIN) 20 MG tablet Take 20 mg by mouth 3 (three) times daily.      nicotine (NICODERM CQ) 21 mg/24 hr Place 1 patch onto the skin once daily. 28 patch 1    ondansetron (ZOFRAN-ODT) 8 MG TbDL Take 1 tablet (8 mg total) by mouth every 6 (six) hours as needed (nausea). 30 tablet 1    QUEtiapine (SEROQUEL) 300 MG Tab Take 300 mg by mouth every evening.      VITAFOL GUMMIES 3.33 mg iron- 0.33 mg Chew Take 2 tablets by mouth once daily. 180 tablet 1    [DISCONTINUED] butalbital-acetaminophen-caffeine -40 mg (FIORICET, ESGIC) -40 mg per tablet Take 2 tablets by mouth once daily. 30 tablet 0    [DISCONTINUED] famotidine (PEPCID) 40 MG tablet Take 1 tablet (40 mg total) by mouth once daily. Due for annual with PCP 90 tablet 1    [DISCONTINUED] levomefolate calcium (ELFOLATE) 15 mg Tab       [DISCONTINUED] mupirocin (BACTROBAN) 2 % ointment 2 (two) times daily. apply to affected area (Patient not taking: Reported on 8/15/2024)      [DISCONTINUED]  pantoprazole (PROTONIX) 40 MG tablet Take 1 tablet (40 mg total) by mouth once daily. Due for annual with PCP 90 tablet 0    [DISCONTINUED] ranitidine (ZANTAC) 150 MG tablet Take 1 tablet (150 mg total) by mouth 2 (two) times daily as needed. 60 tablet 11    [DISCONTINUED] valACYclovir (VALTREX) 1000 MG tablet TAKE 2 TABLETS BY MOUTH EVERY TWELVE HOURS FOR 1 DAY AS NEEDED FIRST SIGNS OF RASH 4 tablet 0     No current facility-administered medications on file prior to visit.     Review of Systems   Constitutional:  Negative for activity change, appetite change, chills, fatigue, fever and unexpected weight change.   HENT:  Negative for congestion, hearing loss, rhinorrhea and sore throat.    Eyes:  Negative for discharge and visual disturbance.   Respiratory:  Positive for cough. Negative for chest tightness, shortness of breath and wheezing.    Cardiovascular:  Negative for chest pain and leg swelling.   Gastrointestinal:  Negative for abdominal pain, blood in stool, diarrhea and vomiting.   Endocrine: Negative for polydipsia and polyuria.   Genitourinary:  Negative for difficulty urinating, dysuria and hematuria.   Musculoskeletal:  Negative for arthralgias, joint swelling, myalgias and neck pain.   Skin:  Negative for rash and wound.   Neurological:  Positive for headaches. Negative for dizziness and weakness.   Psychiatric/Behavioral:  Negative for behavioral problems, confusion and dysphoric mood. The patient is not nervous/anxious.      There were no vitals filed for this visit.    Wt Readings from Last 3 Encounters:   09/05/24 59.6 kg (131 lb 6.3 oz)   08/15/24 55.5 kg (122 lb 5.7 oz)   03/01/23 63 kg (139 lb)     Physical Exam  Vitals reviewed.   Constitutional:       General: She is not in acute distress.     Appearance: Normal appearance. She is normal weight. She is not ill-appearing.   HENT:      Head: Normocephalic.      Right Ear: External ear normal.      Left Ear: External ear normal.       Mouth/Throat:      Mouth: Mucous membranes are moist.   Eyes:      Conjunctiva/sclera: Conjunctivae normal.   Pulmonary:      Effort: Pulmonary effort is normal. No respiratory distress.   Musculoskeletal:         General: No swelling. Normal range of motion.      Cervical back: Normal range of motion.   Skin:     Coloration: Skin is not pale.      Findings: No rash.   Neurological:      General: No focal deficit present.      Mental Status: She is alert and oriented to person, place, and time. Mental status is at baseline.   Psychiatric:         Attention and Perception: Attention normal.         Mood and Affect: Mood normal. Mood is not anxious, depressed or elated. Affect is not labile, blunt, flat, angry or tearful.         Speech: Speech normal. She is communicative. Speech is not rapid and pressured, delayed or slurred.         Behavior: Behavior normal. Behavior is not agitated, slowed, aggressive, withdrawn or hyperactive. Behavior is cooperative.         Thought Content: Thought content normal. Thought content is not paranoid. Thought content does not include homicidal or suicidal ideation.         Judgment: Judgment normal.       Health Maintenance   Topic Date Due    Pneumococcal Vaccines (Age 0-49) (1 of 2 - PCV) Never done    Influenza Vaccine (1) 09/01/2024    COVID-19 Vaccine (2 - 2024-25 season) 09/01/2024    Cervical Cancer Screening  08/15/2029    TETANUS VACCINE  04/10/2032    RSV Vaccine (Age 60+ and Pregnant patients) (1 - 1-dose 75+ series) 12/21/2064    Hepatitis C Screening  Completed    HIV Screening  Completed    Lipid Panel  Completed     This note was generated with the assistance of ambient listening technology. Verbal consent was obtained by the patient and accompanying visitor(s) for the recording of patient appointment to facilitate this note. I attest to having reviewed and edited the generated note for accuracy, though some syntax or spelling errors may persist. Please contact the  author of this note for any clarification.    Visit today included increased complexity associated with the care of the episodic problem - see above- addressed and managing the longitudinal care of the patient due to the serious and/or complex managed problem(s) - see above.

## 2025-02-06 NOTE — ASSESSMENT & PLAN NOTE
Medications refilled.  Please be advised of condition course.  - Take PPI in the morning 30-60 minutes before breakfast  - I recommend ongoing Education for lifestyle modifications to help control/reduce reflux/abdominal pain including: avoid large meals, avoid eating within 2-3 hours of bedtime (avoid late night eating & lying down soon after eating), elevate head of bed if nocturnal symptoms are present, smoking cessation (if current smoker), & weight loss (if overweight).   - please be advised to avoid known foods which trigger reflux symptoms & to minimize/avoid high-fat foods, chocolate, caffeine, citrus, alcohol, & tomato products.  - Advised to avoid/limit use of NSAID's, since they can cause GI upset, bleeding, and/or ulcers. If needed, take with food.

## 2025-02-06 NOTE — ASSESSMENT & PLAN NOTE
Pulmonology referral placed. Recommend smoking cessation. Continue inhalers. Avoid triggers. She would like referral to genetic as well.

## 2025-02-06 NOTE — ASSESSMENT & PLAN NOTE
Temp refill. She has an appt with neurology at the end of the month. Will need to follow up with neurology for additional refills/management.

## 2025-02-24 ENCOUNTER — TELEPHONE (OUTPATIENT)
Dept: NEUROLOGY | Facility: CLINIC | Age: 35
End: 2025-02-24
Payer: MEDICAID

## 2025-02-24 ENCOUNTER — TELEPHONE (OUTPATIENT)
Dept: NEUROLOGY | Facility: CLINIC | Age: 35
End: 2025-02-24

## 2025-02-24 NOTE — TELEPHONE ENCOUNTER
----- Message from Betsey sent at 2/24/2025  2:54 PM CST -----  Contact: PT  Type:  Sooner Apoointment RequestCaller is requesting a sooner appointment.  Caller declined first available appointment listed below.  Caller will not accept being placed on the waitlist and is requesting a message be sent to doctor.Name of Caller:PT When is the first available appointment?N/ASymptoms:Would the patient rather a call back or a response via [facility]? CALL Best Call Back Number:[phone] Information: THANK YOU

## 2025-02-28 DIAGNOSIS — Z76.0 MEDICATION REFILL: ICD-10-CM

## 2025-02-28 RX ORDER — VALACYCLOVIR HYDROCHLORIDE 1 G/1
TABLET, FILM COATED ORAL
Qty: 10 TABLET | Refills: 0 | Status: SHIPPED | OUTPATIENT
Start: 2025-02-28

## 2025-02-28 RX ORDER — BUTALBITAL, ACETAMINOPHEN AND CAFFEINE 50; 325; 40 MG/1; MG/1; MG/1
2 TABLET ORAL DAILY
Qty: 30 TABLET | Refills: 0 | Status: SHIPPED | OUTPATIENT
Start: 2025-02-28

## 2025-03-06 ENCOUNTER — TELEPHONE (OUTPATIENT)
Dept: NEUROLOGY | Facility: CLINIC | Age: 35
End: 2025-03-06
Payer: MEDICAID

## 2025-03-06 NOTE — TELEPHONE ENCOUNTER
Spoke with patient and informed will need to reschedule virtual appointment on 3/11/25 because patient must be seen in person due to the type of medication she is on. Appointment rescheduled to 2 pm on 3/14/25 with Leslie Apple.

## 2025-03-12 ENCOUNTER — TELEPHONE (OUTPATIENT)
Dept: HEMATOLOGY/ONCOLOGY | Facility: CLINIC | Age: 35
End: 2025-03-12
Payer: MEDICAID

## 2025-03-13 ENCOUNTER — PATIENT MESSAGE (OUTPATIENT)
Dept: HEMATOLOGY/ONCOLOGY | Facility: CLINIC | Age: 35
End: 2025-03-13
Payer: MEDICAID

## 2025-03-14 ENCOUNTER — TELEPHONE (OUTPATIENT)
Dept: NEUROLOGY | Facility: CLINIC | Age: 35
End: 2025-03-14
Payer: MEDICAID

## 2025-03-14 NOTE — TELEPHONE ENCOUNTER
----- Message from Betsey sent at 3/14/2025  1:20 PM CDT -----  Contact: PT  Type:  Needs Medical AdviceWho Called: PT Symptoms (please be specific): PT RECEIVED A LETTER STATING THAT SHE WAS NO LONGER A PT SO SHE DIDN'T LEOLA A RIDE WITH HER INSUR COMPANY FOR HER 03/14/25 APPT. PLEASE CALL TO DISCUSS Would the patient rather a call back or a response via [facility]? CALL Best Call Back Number: [phone] Information: THANK YOU

## 2025-03-19 ENCOUNTER — OFFICE VISIT (OUTPATIENT)
Dept: HEMATOLOGY/ONCOLOGY | Facility: CLINIC | Age: 35
End: 2025-03-19
Payer: MEDICAID

## 2025-03-19 DIAGNOSIS — D22.9 SKIN MOLE: ICD-10-CM

## 2025-03-19 DIAGNOSIS — Z76.0 MEDICATION REFILL: ICD-10-CM

## 2025-03-19 DIAGNOSIS — Z76.89 REFERRAL OF PATIENT: ICD-10-CM

## 2025-03-19 DIAGNOSIS — Z14.1 CYSTIC FIBROSIS CARRIER: Primary | ICD-10-CM

## 2025-03-19 NOTE — PROGRESS NOTES
Cancer Genetics  Hereditary and High-Risk Clinic  Department of Hematology and Oncology  [facility]    Date of Service:  3/19/25  Visit Provider:  Charles Daley, [facility], [facility]  Collaborating Physician:  Mani Kang MD    Patient ID  Name: Sara Sanford    : 1990    MRN: [id number]      Referring Provider:   Sendy Mcrae, TONIE  [address] [place] [address]    Televisit Information  The patient location is:  [facility] .    The chief complaint leading to consultation is:  As below.    Visit type: audiovisual.    Face-to-face time with patient: 40 minutes.    55 minutes in total were spent on this encounter on the day of service, which includes face-to-face time and non-face-to-face time preparing to see the patient (e.g., review of tests), obtaining and/or reviewing separately obtained history, documenting clinical information in the electronic or other health record, independently interpreting results (not separately reported) and communicating results to the patient/family/caregiver, or coordinating care (not separately reported).    Each patient provided medical services by telemedicine is:  (1) informed of the relationship between the physician and patient and the respective role of any other health care provider with respect to management of the patient; and (2) notified that he or she may decline to receive medical services by telemedicine and may withdraw from such care at any time.    OSWALD Sanford is a 34 y.o. yo female who was referred to genetic counseling due to her cystic fibrosis carrier status. 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 Her carrier status with this specific CFTR mutation is not expected to have a CF phenotype or pancreatic insufficiency. Sara reported that there may be a family history of  "CF on her son's (Sav) father's side of the family, so Sav's father would be encouraged to have carrier screening. Sara did not report health concerns that would be suggestive of CF in her children. Sav reportedly has some GI problems though they seem unrelated to a CF phenotype. No additional testing was recommended at this time, particularly for hereditary cancer as Sara does not have a suggestive family history. Referral has been placed for dermatology for a routine skin exam as she reports a suspicious mole that has not yet been assessed.    SUBJECTIVE      Chief Complaint: Genetic evaluation (cystic fibrosis carrier)    History of Present Illness (HPI):  Sara Sanford ("Sara"), 34 y.o., assigned female sex at birth, is new to the [facility] of [facility] and to me.  She was referred by Sendy Mcrae (internal medicine) for hereditary cancer risk assessment given her cystic fibrosis carrier status.    Age:  34 y.o.   Race and ethnicity:  White, Not  or /a  Weight:  131 lb  Height:  5'4"  Previous germline cancer genetic testing:  Yes - CF panel, Heterozygous for [id number] mutation     Cancer History  No personal history of cancer   No history of masses/tumors/lesions  Precancerous cervical lesions a few years ago    Breast History  Most recent mammogram: No  Mammographic breast density:  N/A  Breast MRI:  No  Breast biopsy history and findings:  No  Thoracic radiation therapy history:  N/A    Gynecologic History  Age at menarche: 12y  Age at first live childbirth:  15y   Menopausal status:  premenopausal  Reproductive organs:  tubal ligation around 2022     Hormone Use  Estrogen/Progesterone: No  OCPs: less than a year, then used Depo  Testosterone: No    GI History  Upper GI screening: No  Most recent colonoscopy: 5/10/2018  Colon polyp:  No  Pancreatitis:  No    Dermatologic History  No issues reported  Abnormal moles/lesions: mole behind ear not yet assessed  Skin " cancer screening: No    Focused Social History  Tobacco Use: High Risk (2/6/2025)    Patient History     Smoking Tobacco Use: Every Day     Smokeless Tobacco Use: Never     Passive Exposure: Never     FAMILY HISTORY      ONCOLOGY PEDIGREE  Ashkenazi Yazidi:  No  Consanguinity:  No  Hereditary cancer genetic testing in blood relatives:  No    Family Cancer Pedigree:  Pedigree Image    Sara reported her  family history of cancer as follows:   Maternal uncle diagnosed with melanoma     COUNSELING      Instead of hereditary cancer risk, we discussed Sara's CF carrier status.     ASSESSMENT / PLAN     Sara appeared to have a good understanding of the information. Questions were encouraged and answered to the patient's satisfaction, and she verbalized understanding of the information and agreement with the plan.   Sara is welcome to contact me if she has any questions, concerns, or updates to her family history.     This assessment is based on the history and reports provided, as well as the current scientific knowledge regarding cancer genetics.     Charles Daley MGC, [facility]  Certified Genetic Counselor  Department of Hematology and Oncology  [facility]    CC:  Sendy Mcrae

## 2025-03-20 RX ORDER — BUTALBITAL, ACETAMINOPHEN AND CAFFEINE 50; 325; 40 MG/1; MG/1; MG/1
2 TABLET ORAL
Qty: 30 TABLET | Refills: 0 | OUTPATIENT
Start: 2025-03-20

## 2025-03-20 RX ORDER — VALACYCLOVIR HYDROCHLORIDE 1 G/1
TABLET, FILM COATED ORAL
Qty: 10 TABLET | Refills: 0 | OUTPATIENT
Start: 2025-03-20

## 2025-03-26 DIAGNOSIS — Z76.0 MEDICATION REFILL: ICD-10-CM

## 2025-03-26 DIAGNOSIS — R21 RASH: ICD-10-CM

## 2025-03-26 RX ORDER — ACYCLOVIR 50 MG/G
OINTMENT TOPICAL
Qty: 15 G | Refills: 1 | Status: SHIPPED | OUTPATIENT
Start: 2025-03-26

## 2025-03-26 RX ORDER — BUTALBITAL, ACETAMINOPHEN AND CAFFEINE 50; 325; 40 MG/1; MG/1; MG/1
2 TABLET ORAL
Qty: 30 TABLET | Refills: 0 | OUTPATIENT
Start: 2025-03-26

## 2025-03-26 RX ORDER — VALACYCLOVIR HYDROCHLORIDE 1 G/1
TABLET, FILM COATED ORAL
Qty: 10 TABLET | Refills: 0 | Status: SHIPPED | OUTPATIENT
Start: 2025-03-26

## 2025-03-26 NOTE — TELEPHONE ENCOUNTER
Refill Routing Note   Medication(s) are not appropriate for processing by [facility] for the following reason(s):        Required labs outdated: CBC    [facility] action(s):  Defer             Appointments  past 12m or future 3m with PCP    Date Provider   Last Visit   9/5/2024 Kristian Landaverde MD   Next Visit   Visit date not found Kristian Landaverde MD   ED visits in past 90 days: 0        Note composed:4:04 PM 03/26/2025

## 2025-04-03 ENCOUNTER — PATIENT MESSAGE (OUTPATIENT)
Dept: FAMILY MEDICINE | Facility: CLINIC | Age: 35
End: 2025-04-03
Payer: MEDICAID

## 2025-04-03 NOTE — TELEPHONE ENCOUNTER
Pt wants a refill of the fioricet. However, AD stated to see neurology for further refills. Please advise.

## 2025-04-25 ENCOUNTER — TELEPHONE (OUTPATIENT)
Dept: INFECTIOUS DISEASES | Facility: CLINIC | Age: 35
End: 2025-04-25
Payer: MEDICAID

## 2025-05-07 DIAGNOSIS — Z76.0 MEDICATION REFILL: ICD-10-CM

## 2025-05-07 RX ORDER — VALACYCLOVIR HYDROCHLORIDE 1 G/1
1000 TABLET, FILM COATED ORAL EVERY 12 HOURS
Qty: 20 TABLET | Refills: 0 | Status: SHIPPED | OUTPATIENT
Start: 2025-05-07 | End: 2025-05-17

## 2025-05-07 RX ORDER — VALACYCLOVIR HYDROCHLORIDE 1 G/1
TABLET, FILM COATED ORAL
Qty: 10 TABLET | Refills: 0 | Status: SHIPPED | OUTPATIENT
Start: 2025-05-07 | End: 2025-05-07

## 2025-05-07 NOTE — TELEPHONE ENCOUNTER
Refill Routing Note   Medication(s) are not appropriate for processing by [facility] for the following reason(s):        Non-participating provider  Required labs outdated    [facility] action(s):  Route  Defer             Appointments  past 12m or future 3m with PCP    Date Provider   Last Visit   2/6/2025 Sendy Mcrae NP   Next Visit   Visit date not found Sendy Mcrae NP   ED visits in past 90 days: 0        Note composed:1:42 PM 05/07/2025

## 2025-05-08 ENCOUNTER — RESULTS FOLLOW-UP (OUTPATIENT)
Dept: FAMILY MEDICINE | Facility: CLINIC | Age: 35
End: 2025-05-08

## 2025-05-08 ENCOUNTER — TELEPHONE (OUTPATIENT)
Dept: EMERGENCY MEDICINE | Facility: HOSPITAL | Age: 35
End: 2025-05-08
Payer: MEDICAID

## 2025-05-08 ENCOUNTER — HOSPITAL ENCOUNTER (OUTPATIENT)
Dept: RADIOLOGY | Facility: HOSPITAL | Age: 35
Discharge: HOME | End: 2025-05-08
Attending: FAMILY MEDICINE
Payer: MEDICAID

## 2025-05-08 ENCOUNTER — OFFICE VISIT (OUTPATIENT)
Dept: FAMILY MEDICINE | Facility: CLINIC | Age: 35
End: 2025-05-08
Payer: MEDICAID

## 2025-05-08 VITALS
WEIGHT: 128.5 LBS | SYSTOLIC BLOOD PRESSURE: 110 MMHG | OXYGEN SATURATION: 98 % | BODY MASS INDEX: 21.94 KG/M2 | HEART RATE: 109 BPM | HEIGHT: 64 IN | DIASTOLIC BLOOD PRESSURE: 80 MMHG

## 2025-05-08 DIAGNOSIS — Z79.899 ENCOUNTER FOR LONG-TERM (CURRENT) USE OF MEDICATIONS: ICD-10-CM

## 2025-05-08 DIAGNOSIS — Z23 NEED FOR PNEUMOCOCCAL VACCINE: ICD-10-CM

## 2025-05-08 DIAGNOSIS — R32 URINARY INCONTINENCE, UNSPECIFIED TYPE: Primary | ICD-10-CM

## 2025-05-08 DIAGNOSIS — B19.10 HEPATITIS B INFECTION WITHOUT DELTA AGENT WITHOUT HEPATIC COMA, UNSPECIFIED CHRONICITY: ICD-10-CM

## 2025-05-08 DIAGNOSIS — R10.9 ABDOMINAL PAIN, UNSPECIFIED ABDOMINAL LOCATION: ICD-10-CM

## 2025-05-08 DIAGNOSIS — K59.00 CONSTIPATION, UNSPECIFIED CONSTIPATION TYPE: ICD-10-CM

## 2025-05-08 DIAGNOSIS — E16.2 HYPOGLYCEMIA: ICD-10-CM

## 2025-05-08 DIAGNOSIS — E16.2 HYPOGLYCEMIA WITHOUT DIAGNOSIS OF DIABETES MELLITUS: Primary | ICD-10-CM

## 2025-05-08 DIAGNOSIS — K92.1 BLOODY STOOL: ICD-10-CM

## 2025-05-08 DIAGNOSIS — K59.00 CONSTIPATION, UNSPECIFIED CONSTIPATION TYPE: Primary | ICD-10-CM

## 2025-05-08 DIAGNOSIS — Z76.0 MEDICATION REFILL: ICD-10-CM

## 2025-05-08 DIAGNOSIS — K21.9 GASTROESOPHAGEAL REFLUX DISEASE WITHOUT ESOPHAGITIS: ICD-10-CM

## 2025-05-08 DIAGNOSIS — G43.709 CHRONIC MIGRAINE WITHOUT AURA WITHOUT STATUS MIGRAINOSUS, NOT INTRACTABLE: ICD-10-CM

## 2025-05-08 DIAGNOSIS — B18.2 CHRONIC HEPATITIS C WITHOUT HEPATIC COMA: Chronic | ICD-10-CM

## 2025-05-08 DIAGNOSIS — J42 CHRONIC BRONCHITIS, UNSPECIFIED CHRONIC BRONCHITIS TYPE: Chronic | ICD-10-CM

## 2025-05-08 DIAGNOSIS — F17.210 CIGARETTE SMOKER: ICD-10-CM

## 2025-05-08 PROBLEM — T42.6X1A GABAPENTIN OVERDOSE: Status: RESOLVED | Noted: 2020-03-03 | Resolved: 2025-05-08

## 2025-05-08 PROBLEM — F31.9 BIPOLAR 1 DISORDER, DEPRESSED: Status: ACTIVE | Noted: 2020-05-23

## 2025-05-08 PROBLEM — R76.8 HEPATITIS C ANTIBODY TEST POSITIVE: Chronic | Status: ACTIVE | Noted: 2023-03-01

## 2025-05-08 PROBLEM — F90.2 ADHD (ATTENTION DEFICIT HYPERACTIVITY DISORDER), COMBINED TYPE: Status: ACTIVE | Noted: 2023-01-01

## 2025-05-08 PROCEDURE — 99215 OFFICE O/P EST HI 40 MIN: CPT | Mod: PBBFAC,25,PO | Performed by: FAMILY MEDICINE

## 2025-05-08 PROCEDURE — 90471 IMMUNIZATION ADMIN: CPT | Mod: PBBFAC,PO

## 2025-05-08 PROCEDURE — 99999 PR PBB SHADOW E&M-EST. PATIENT-LVL V: CPT | Mod: PBBFAC,,, | Performed by: FAMILY MEDICINE

## 2025-05-08 PROCEDURE — 90677 PCV20 VACCINE IM: CPT | Mod: PBBFAC,PO

## 2025-05-08 PROCEDURE — 99999 PR PBB SHADOW E&M-EST. PATIENT-LVL I: CPT | Mod: PBBFAC,,, | Performed by: FAMILY MEDICINE

## 2025-05-08 PROCEDURE — 74019 RADEX ABDOMEN 2 VIEWS: CPT | Mod: 26,,, | Performed by: STUDENT IN AN ORGANIZED HEALTH CARE EDUCATION/TRAINING PROGRAM

## 2025-05-08 PROCEDURE — 74019 RADEX ABDOMEN 2 VIEWS: CPT | Mod: TC,PO

## 2025-05-08 PROCEDURE — 99999PBSHW PR PBB SHADOW TECHNICAL ONLY FILED TO HB: Mod: PBBFAC,,,

## 2025-05-08 RX ORDER — RIMEGEPANT SULFATE 75 MG/75MG
75 TABLET, ORALLY DISINTEGRATING ORAL DAILY PRN
Qty: 9 TABLET | Refills: 2 | Status: SHIPPED | OUTPATIENT
Start: 2025-05-08

## 2025-05-08 RX ORDER — HYDROCORTISONE 25 MG/G
CREAM TOPICAL 2 TIMES DAILY
Qty: 454 G | Refills: 3 | Status: SHIPPED | OUTPATIENT
Start: 2025-05-08

## 2025-05-08 RX ORDER — CITALOPRAM 20 MG/1
TABLET, FILM COATED ORAL
COMMUNITY
Start: 2024-06-17

## 2025-05-08 RX ORDER — DOCUSATE SODIUM 100 MG/1
100 CAPSULE, LIQUID FILLED ORAL 2 TIMES DAILY PRN
Qty: 60 CAPSULE | Refills: 0 | Status: SHIPPED | OUTPATIENT
Start: 2025-05-08 | End: 2025-06-07

## 2025-05-08 RX ORDER — HYDROXYZINE HYDROCHLORIDE 50 MG/1
50 TABLET, FILM COATED ORAL 2 TIMES DAILY
COMMUNITY
End: 2025-05-08

## 2025-05-08 RX ORDER — BUTALBITAL, ACETAMINOPHEN AND CAFFEINE 50; 325; 40 MG/1; MG/1; MG/1
2 TABLET ORAL DAILY
Qty: 30 TABLET | Refills: 0 | Status: CANCELLED | OUTPATIENT
Start: 2025-05-08

## 2025-05-08 RX ORDER — DIVALPROEX SODIUM 250 MG/1
250 TABLET, DELAYED RELEASE ORAL 2 TIMES DAILY
COMMUNITY

## 2025-05-08 RX ORDER — OMEPRAZOLE 40 MG/1
40 CAPSULE, DELAYED RELEASE ORAL DAILY
Qty: 90 CAPSULE | Refills: 3 | Status: SHIPPED | OUTPATIENT
Start: 2025-05-08 | End: 2026-05-08

## 2025-05-08 RX ORDER — FAMOTIDINE 40 MG/1
40 TABLET, FILM COATED ORAL DAILY
Qty: 90 TABLET | Refills: 3 | Status: SHIPPED | OUTPATIENT
Start: 2025-05-08

## 2025-05-08 RX ADMIN — PNEUMOCOCCAL 20-VALENT CONJUGATE VACCINE 0.5 ML
2.2; 2.2; 2.2; 2.2; 2.2; 2.2; 2.2; 2.2; 2.2; 2.2; 2.2; 2.2; 2.2; 2.2; 2.2; 2.2; 4.4; 2.2; 2.2; 2.2 INJECTION, SUSPENSION INTRAMUSCULAR at 12:05

## 2025-05-08 NOTE — ASSESSMENT & PLAN NOTE
Start stool softener.  Check x-ray for stool burden.  Referral to GI and patient would need updated colonoscopy with bloody stool.    Please be advised constipation condition course.  I recommend increase daily water intake to an acceptable level.  Increase fiber.  Recommend stool softener and laxative if needed.  Goal of 1 bowel movement daily.  Follow-up to clinic or notify me if no improvement or symptoms are persistent or worsening.  ER precautions were given.  Recommend daily exercise as tolerated, adequate water intake (six 8-oz glasses of water daily), and high fiber diet. OTC fiber supplements are recommended if diet does not reach daily fiber goal (20-30 grams daily), such as Metamucil, Citrucel, or FiberCon (take as directed, separate from other oral medications by >2 hours).  - CONTINUE OTC stool softener such as Colace as directed to avoid hard stools and straining with bowel movements PRN  -If still no improvement with these measures, call/follow-up

## 2025-05-08 NOTE — PATIENT INSTRUCTIONS
Follow up in about 6 months (around 11/8/2025), or if symptoms worsen or fail to improve, for Med refills, LAB RESULTS.     Dear patient,   As a result of recent federal legislation (The Federal Cures Act), you may receive lab or pathology results from your visit in your [facility] account before your physician is able to contact you. Your physician or their representative will relay the results to you with their recommendations at their soonest availability.     If no improvement in symptoms or symptoms worsen, please be advised to call MD, follow-up at clinic and/or go to ER if becomes severe.    Paul Larson M.D.        We Offer TELEHEALTH & Same Day Appointments!   Book your Telehealth appointment with me through my nurse or   Clinic appointments on [facility]!    [address] [place] [address]    Office: [phone]   FAX: [phone]    Check out my Facebook Page and Follow Me at: [email or web address]    Check out my website at [employer] by clicking on: [email or web address]    To Schedule appointments online, go to [facility]: [email or web address]

## 2025-05-08 NOTE — ASSESSMENT & PLAN NOTE
Follow-up with Pulmonary as scheduled.  Recommend smoking cessation. Continue inhalers. Avoid triggers.  Patient is following with genetics for CF carrier.

## 2025-05-08 NOTE — ASSESSMENT & PLAN NOTE
Change Protonix to omeprazole.  GERD RECOMMENDATIONS  Please be advised of condition course.  - Take PPI in the morning 30-60 minutes before breakfast  - I recommend ongoing Education for lifestyle modifications to help control/reduce reflux/abdominal pain including: avoid large meals, avoid eating within 2-3 hours of bedtime (avoid late night eating & lying down soon after eating), elevate head of bed if nocturnal symptoms are present, smoking cessation (if current smoker), & weight loss (if overweight).   - please be advised to avoid known foods which trigger reflux symptoms & to minimize/avoid high-fat foods, chocolate, caffeine, citrus, alcohol, & tomato products.  - Advised to avoid/limit use of NSAID's, since they can cause GI upset, bleeding, and/or ulcers. If needed, take with food.

## 2025-05-08 NOTE — PROGRESS NOTES
1st check to see if patient has seen the results.  If not then  CALL patient with results and Document verification.  Schedule follow-up if needed.  [phone]    There are no preventive care reminders to display for this patient.    X-ray of the abdomen reviewed by radiology.  There is evidence of constipation.  Proceed with bowel cleanout with stool softener and laxatives.  Follow-up/establish care with GI if no improvement.  ER precautions.

## 2025-05-08 NOTE — TELEPHONE ENCOUNTER
I have signed for the following orders AND/OR meds.  Please call the patient and ask the patient to schedule the testing AND/OR inform about any medications that were sent.      Orders Placed This Encounter   Procedures    Ambulatory referral/consult to Urology     Standing Status:   Future     Expected Date:   5/15/2025     Expiration Date:   6/8/2026     Referral Priority:   Routine     Referral Type:   Consultation     Referral Reason:   Specialty Services Required     Requested Specialty:   Urology     Number of Visits Requested:   1

## 2025-05-08 NOTE — ASSESSMENT & PLAN NOTE
Patient advised that she will need to establish care with a neurologist for Fioricet.  We can try Nurtec in the meantime.

## 2025-05-08 NOTE — PROGRESS NOTES
PLAN:      Assessment & Plan  1. Annual checkup.  - She is due for a pneumonia vaccine and is currently up-to-date with her lab work until 08/2025.  - Metabolic panel, kidney function, liver function, electrolytes, cholesterol, insulin level, and thyroid screening tests were normal in 08/2024 and 09/2024.  - PCV20 vaccine will be administered today.  - She follows with OB/GYN and has an appointment with pulmonary in the future.    2. Chronic migraines.  - She reports chronic migraines and has failed multiple medications including Dilantin, Topamax, Depakote, Lamictal, phenobarbital, and triptans.  - She is currently on Fioricet and has an upcoming appointment with neurology.  - A referral to neurology will be initiated.  - Nurtec will be considered as a new treatment option.    3. Constipation.  - She reports experiencing constipation and hematochezia, with recent episodes of diarrhea and bright red blood when wiping.  - Physical exam reveals abdominal pain and irritation in the anal area.  - An abdominal x-ray will be conducted today to assess the extent of stool accumulation.  - She will be started on Colace.    4. Gastroesophageal reflux disease (GERD).  - She reports abdominal pain and is currently taking Protonix and Pepcid for reflux.  - Protonix will be switched to omeprazole to see if it alleviates her stomach pain and reflux symptoms.  - She will continue taking Pepcid.  - Referral to GI will be initiated for further evaluation.    5. Hepatitis B.  - She has a history of hepatitis B and C, and was previously referred to infectious disease but did not attend the appointment.  - A referral to infectious disease will be reinitiated.  - Discussion about the need for treatment and clarification of her hepatitis B status.    Problem List Items Addressed This Visit       Chronic hepatitis C without hepatic coma (Chronic)    Referral to infectious disease and follow-up with GI recommended.         Relevant Orders     Ambulatory referral/consult to Infectious Disease    CBC Without Differential    Comprehensive Metabolic Panel    TSH    Hemoglobin A1C    Lipid Panel    Cigarette smoker (Chronic)    Referral to smoking cessation.         Relevant Orders    Ambulatory referral/consult to Smoking Cessation Program    CBC Without Differential    Comprehensive Metabolic Panel    TSH    Hemoglobin A1C    Lipid Panel    Encounter for long-term (current) use of medications (Chronic)    Complete history and physical was completed today.  Complete and thorough medication reconciliation was performed.  Discussed risks and benefits of medications.  Advised patient on orders and health maintenance.  We discussed old records and old labs if available.  Will request any records not available through epic.  Continue current medications listed on your summary sheet.         Relevant Orders    CBC Without Differential    Comprehensive Metabolic Panel    TSH    Hemoglobin A1C    Lipid Panel    Chronic migraine without aura without status migrainosus, not intractable (Chronic)    Patient advised that she will need to establish care with a neurologist for Fioricet.  We can try Nurtec in the meantime.         Relevant Medications    rimegepant (NURTEC) 75 mg odt    Other Relevant Orders    Ambulatory referral/consult to Neurology    CBC Without Differential    Comprehensive Metabolic Panel    TSH    Hemoglobin A1C    Lipid Panel    Chronic bronchitis (Chronic)    Follow-up with Pulmonary as scheduled.  Recommend smoking cessation. Continue inhalers. Avoid triggers.  Patient is following with genetics for CF carrier.         Relevant Orders    CBC Without Differential    Comprehensive Metabolic Panel    TSH    Hemoglobin A1C    Lipid Panel    Gastroesophageal reflux disease without esophagitis (Chronic)    Change Protonix to omeprazole.  GERD RECOMMENDATIONS  Please be advised of condition course.  - Take PPI in the morning 30-60 minutes before  breakfast  - I recommend ongoing Education for lifestyle modifications to help control/reduce reflux/abdominal pain including: avoid large meals, avoid eating within 2-3 hours of bedtime (avoid late night eating & lying down soon after eating), elevate head of bed if nocturnal symptoms are present, smoking cessation (if current smoker), & weight loss (if overweight).   - please be advised to avoid known foods which trigger reflux symptoms & to minimize/avoid high-fat foods, chocolate, caffeine, citrus, alcohol, & tomato products.  - Advised to avoid/limit use of NSAID's, since they can cause GI upset, bleeding, and/or ulcers. If needed, take with food.          Relevant Medications    omeprazole (PRILOSEC) 40 MG capsule    famotidine (PEPCID) 40 MG tablet    Other Relevant Orders    Ambulatory referral/consult to Gastroenterology    CBC Without Differential    Comprehensive Metabolic Panel    TSH    Hemoglobin A1C    Lipid Panel    Ambulatory referral/consult to Gastroenterology    Hepatitis B infection without delta agent without hepatic coma (Chronic)    Referral to infectious disease.         Relevant Orders    Ambulatory referral/consult to Infectious Disease    CBC Without Differential    Comprehensive Metabolic Panel    TSH    Hemoglobin A1C    Lipid Panel    Constipation - Primary (Chronic)    Start stool softener.  Check x-ray for stool burden.  Referral to GI and patient would need updated colonoscopy with bloody stool.    Please be advised constipation condition course.  I recommend increase daily water intake to an acceptable level.  Increase fiber.  Recommend stool softener and laxative if needed.  Goal of 1 bowel movement daily.  Follow-up to clinic or notify me if no improvement or symptoms are persistent or worsening.  ER precautions were given.  Recommend daily exercise as tolerated, adequate water intake (six 8-oz glasses of water daily), and high fiber diet. OTC fiber supplements are recommended if  diet does not reach daily fiber goal (20-30 grams daily), such as Metamucil, Citrucel, or FiberCon (take as directed, separate from other oral medications by >2 hours).  - CONTINUE OTC stool softener such as Colace as directed to avoid hard stools and straining with bowel movements PRN  -If still no improvement with these measures, call/follow-up           Relevant Medications    docusate sodium (COLACE) 100 MG capsule    Other Relevant Orders    Ambulatory referral/consult to Gastroenterology    Ambulatory referral/consult to Endo Procedure     X-Ray Abdomen Flat And Erect (Completed)    CBC Without Differential    Comprehensive Metabolic Panel    TSH    Hemoglobin A1C    Lipid Panel    Ambulatory referral/consult to Gastroenterology    Ambulatory referral/consult to Gastroenterology    Bloody stool    Relevant Medications    hydrocortisone 2.5 % cream    Other Relevant Orders    Ambulatory referral/consult to Gastroenterology    Ambulatory referral/consult to Endo Procedure     CBC Without Differential    Comprehensive Metabolic Panel    TSH    Hemoglobin A1C    Lipid Panel    Ambulatory referral/consult to Gastroenterology    Ambulatory referral/consult to Gastroenterology    Abdominal pain    Relevant Orders    X-Ray Abdomen Flat And Erect (Completed)    CBC Without Differential    Comprehensive Metabolic Panel    TSH    Hemoglobin A1C    Lipid Panel     Other Visit Diagnoses         Need for pneumococcal vaccine        Relevant Medications    pneumoc 20-shaw conj-dip cr(PF) (PREVNAR-20 (PF)) injection Syrg 0.5 mL (Completed)    Other Relevant Orders    CBC Without Differential    Comprehensive Metabolic Panel    TSH    Hemoglobin A1C    Lipid Panel        Discussed risk and benefits of pneumonia vaccination.  Patient agreed to proceed.  Future Appointments       Date Provider Specialty Appt Notes    7/7/2025 Elian Camp MD Neurology headache           Medication Management for  assessment above:   Medication List with Changes/Refills   New Medications    DOCUSATE SODIUM (COLACE) 100 MG CAPSULE    Take 1 capsule (100 mg total) by mouth 2 (two) times daily as needed for Constipation.    HYDROCORTISONE 2.5 % CREAM    Apply topically 2 (two) times daily.    OMEPRAZOLE (PRILOSEC) 40 MG CAPSULE    Take 1 capsule (40 mg total) by mouth once daily.    RIMEGEPANT (NURTEC) 75 MG ODT    Take 1 tablet (75 mg total) by mouth daily as needed for Migraine. Place ODT tablet on the tongue; alternatively the ODT tablet may be placed under the tongue   Current Medications    ALBUTEROL (PROVENTIL/VENTOLIN HFA) 90 MCG/ACTUATION INHALER    Inhale 2 puffs into the lungs every 4 (four) hours as needed for Wheezing. Rescue    ALBUTEROL SULFATE 2.5 MG/0.5 ML NEBU    Take 2.5 mg by nebulization every 6 (six) hours as needed (shortness of breath). Rescue    ALPRAZOLAM (XANAX) 0.5 MG TABLET    Take 1 tablet (0.5 mg total) by mouth 2 (two) times daily as needed for Insomnia or Anxiety.    BRIVARACETAM (BRIVIACT) 100 MG TAB    Take 1 tablet (100 mg total) by mouth 2 (two) times daily.    BUSPIRONE (BUSPAR) 30 MG TAB    Take 1 tablet (30 mg total) by mouth 2 (two) times daily.    BUTALBITAL-ACETAMINOPHEN-CAFFEINE -40 MG (FIORICET, ESGIC) -40 MG PER TABLET    Take 2 tablets by mouth once daily.    CITALOPRAM (CELEXA) 20 MG TABLET        DIVALPROEX (DEPAKOTE) 250 MG EC TABLET    Take 250 mg by mouth 2 (two) times daily.    GABAPENTIN (NEURONTIN) 800 MG TABLET    Take 1 tablet (800 mg total) by mouth 3 (three) times daily.    HYDROXYZINE PAMOATE (VISTARIL) 50 MG CAP    Take 50 mg by mouth daily as needed.    METHYLPHENIDATE HCL (RITALIN) 20 MG TABLET    Take 20 mg by mouth 3 (three) times daily.    NICOTINE (NICODERM CQ) 21 MG/24 HR    Place 1 patch onto the skin once daily.    ONDANSETRON (ZOFRAN-ODT) 8 MG TBDL    Take 1 tablet (8 mg total) by mouth every 6 (six) hours as needed (nausea).    QUETIAPINE  (SEROQUEL) 300 MG TAB    Take 300 mg by mouth every evening.    VALACYCLOVIR (VALTREX) 1000 MG TABLET    Take 1 tablet (1,000 mg total) by mouth every 12 (twelve) hours. for 10 days    VITAFOL GUMMIES 3.33 MG IRON- 0.33 MG CHEW    Take 2 tablets by mouth once daily.   Changed and/or Refilled Medications    Modified Medication Previous Medication    FAMOTIDINE (PEPCID) 40 MG TABLET famotidine (PEPCID) 40 MG tablet       Take 1 tablet (40 mg total) by mouth once daily. Due for annual with PCP    Take 1 tablet (40 mg total) by mouth once daily. Due for annual with PCP   Discontinued Medications    ACYCLOVIR 5% (ZOVIRAX) 5 % OINTMENT    APPLY TOPICALLY 5 (FIVE) TIMES DAILY.    HYDROXYZINE (ATARAX) 50 MG TABLET    Take 50 mg by mouth 2 (two) times daily.    PANTOPRAZOLE (PROTONIX) 40 MG TABLET    Take 1 tablet (40 mg total) by mouth once daily. Due for annual with PCP       Paul Larson M.D.  ==========================================================================  Subjective:   Patient ID: Sara Sanford is a 34 y.o. female.  has a past medical history of Abnormal Pap smear, ALLERGIC RHINITIS, Anemia, Anxiety, Bipolar 1 disorder, Chronic back pain, Drug-seeking behavior (04/16/2012), Epilepsy, GERD (gastroesophageal reflux disease), GERD (gastroesophageal reflux disease), Hepatitis C (2018), Opioid dependence (04/16/2012), Panic attacks, Pre-eclampsia, PTSD (post-traumatic stress disorder), Seizures, and Urinary tract infection.   Chief Complaint: Annual Exam      History of Present Illness  The patient is a 34-year-old female who presents for an annual checkup. Her last visit was in June 2024. Her last blood work was in August 2024, which showed normal metabolic panel, kidney function, liver function, electrolytes, and cholesterol levels. Insulin level and thyroid screening test (TSH) checked in September 2024 were also normal. Hormones were evaluated at that time. She follows with OB/GYN.    She has been  seeing a hereditary clinic for cystic fibrosis carrier status and is supposed to follow up with neurology for chronic migraines. She reports failing multiple medications including Dilantin, Topamax, Depakote, Lamictal, phenobarbital, and triptans. She has a history of GERD and hepatitis C and B. She was previously referred to infectious disease but did not attend the appointment. She has upcoming appointments with pulmonary and neurology. She sees Dr. Kt Wu in [place], [place], who provides her with 0.5 mg Xanax, 60 quantity for 30 days.    She has a scheduled appointment with a pulmonologist for bronchitis. She continues to smoke but is attempting to quit and is not enrolled in any smoking cessation program. 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 She reports no coughing or wheezing.    She has been experiencing severe migraines, which impact her work attendance. She has significantly reduced her Fioricet intake, only using it when absolutely necessary. She does not recall if she has tried Nurtec.    She has been experiencing abdominal pain and irregular bowel movements, alternating between constipation and diarrhea over the past few days. She reports no straining during bowel movements but notes the presence of bright red blood upon wiping. She describes the sensation as similar to a rash on her buttocks. She has not taken any medication for her constipation and reports no nausea or vomiting.    She is currently on Protonix and Pepcid for GERD.    SOCIAL HISTORY  She admits to smoking and is trying to quit.    Problem List Items Addressed This Visit       Chronic hepatitis C without hepatic coma (Chronic)    Overview   May 2025:  Patient has had referral to infectious disease however she no showed the appointment.  Patient reports that she was treated for this.    Previous history:  Chronic.  Control is uncertain.  Patient reports having  viral load tested outside lab.  Requesting records.  Patient currently on Epclusa reports intermittent compliance with treatment.  Checking level.  Patient has not had ultrasound that was ordered previously performed.         Current Assessment & Plan   Referral to infectious disease and follow-up with GI recommended.         Cigarette smoker (Chronic)    Overview   Assistance with smoking cessation was offered, including:  [x]  Medications  [x]  Counseling  [x]  Printed Information on Smoking Cessation  [x]  Referral to a Smoking Cessation Program    Patient was counseled regarding smoking for 3-10 minutes.  -chronic problem. Current every day cigarette smoker, 0.5 PPD  -the patient was counseled on the dangers of tobacco use  -reviewed strategies to maximize success, including counseling, nicotine replacement therapy and pharmacologic option. She would like to try nicotine patches.   -tobacco cessation class offered. Referral to smoking cessation placed.     Time spent: 3-10 minutes          Current Assessment & Plan   Referral to smoking cessation.         Encounter for long-term (current) use of medications (Chronic)    Overview   May 2025:  Reviewed labs.  Patient is on CHRONIC long-term drug therapy for managed conditions. See medication list. Reports compliance.  No side effects reported.  Routine lab work is being monitored.  Patient does need refills today. Reviewed labs.     Lab Results   Component Value Date    WBC 7.04 05/20/2022    HGB 12.6 05/20/2022    HCT 37.5 05/20/2022    MCV 90 05/20/2022     05/20/2022         Chemistry        Component Value Date/Time     08/19/2024 1009    K 4.2 08/19/2024 1009     08/19/2024 1009    CO2 26 08/19/2024 1009    BUN 15 08/19/2024 1009    CREATININE 0.9 08/19/2024 1009    GLU 88 08/19/2024 1009        Component Value Date/Time    CALCIUM 9.5 08/19/2024 1009    ALKPHOS 65 08/19/2024 1009    AST 16 08/19/2024 1009    ALT 14 08/19/2024 1009     BILITOT 0.3 08/19/2024 1009    ESTGFRAFRICA >60.0 04/09/2019 0903    EGFRNONAA >60.0 04/09/2019 0903          Lab Results   Component Value Date    TSH 1.200 09/05/2024    FREET4 1.31 03/04/2010            Current Assessment & Plan   Complete history and physical was completed today.  Complete and thorough medication reconciliation was performed.  Discussed risks and benefits of medications.  Advised patient on orders and health maintenance.  We discussed old records and old labs if available.  Will request any records not available through epic.  Continue current medications listed on your summary sheet.         Chronic migraine without aura without status migrainosus, not intractable (Chronic)    Overview   May 2025:  Patient has a appointment coming up with Neurology.  She has failed several medications but reports that Fioricet is the best medication for her.    Chronic problem. Uncontrolled. Currently on Fioricet with relief reported, requesting refills today.   She has failed several medications in the past:   Past meds:  Dilantin  Topiramate  Depakote  Lamictal  Phenobarbital  Tripitans         Current Assessment & Plan   Patient advised that she will need to establish care with a neurologist for Fioricet.  We can try Nurtec in the meantime.         Chronic bronchitis (Chronic)    Overview   May 2025:  Chronic cough.  Patient is still smoking.    Chronic problem. Intermittent control. Reports recurrent flares. c/o occasional cough, wheezing, and SOB. She is using albuterol inhaler with some relief. She is a current everyday cigarette smoker. She does not see pulmonology. She would like referral to see specialist. She expresses concerns that her s/s may be linked to a CFG mutation discovered during her fifth pregnancy    X-Ray Chest PA And Lateral  Narrative: EXAMINATION:  XR CHEST PA AND LATERAL    CLINICAL HISTORY:  Bronchitis, not specified as acute or chronic    TECHNIQUE:  PA and lateral views of the  chest were performed.    COMPARISON:  04/10/2014    FINDINGS:  Heart size, pulmonary vessels, and mediastinal contours are normal.  Lungs are clear.  No pleural effusion or pneumothorax are identified.  Skeletal structures are intact.  There has been no significant change.  Impression: No radiographic evidence of active chest disease.    Electronically signed by: Miguel A Dixon MD  Date:    11/04/2022  Time:    09:19           Current Assessment & Plan   Follow-up with Pulmonary as scheduled.  Recommend smoking cessation. Continue inhalers. Avoid triggers.  Patient is following with genetics for CF carrier.         Gastroesophageal reflux disease without esophagitis (Chronic)    Overview   May 2025:  Patient reports having some abdominal pain.  Associated with constipation.  Chronic. Stable on daily protonix and pepcid. Denies alarm symptoms, such as dysphagia, weight loss, chest pain, melena, hematemesis, or N/V         Current Assessment & Plan   Change Protonix to omeprazole.  GERD RECOMMENDATIONS  Please be advised of condition course.  - Take PPI in the morning 30-60 minutes before breakfast  - I recommend ongoing Education for lifestyle modifications to help control/reduce reflux/abdominal pain including: avoid large meals, avoid eating within 2-3 hours of bedtime (avoid late night eating & lying down soon after eating), elevate head of bed if nocturnal symptoms are present, smoking cessation (if current smoker), & weight loss (if overweight).   - please be advised to avoid known foods which trigger reflux symptoms & to minimize/avoid high-fat foods, chocolate, caffeine, citrus, alcohol, & tomato products.  - Advised to avoid/limit use of NSAID's, since they can cause GI upset, bleeding, and/or ulcers. If needed, take with food.          Hepatitis B infection without delta agent without hepatic coma (Chronic)    Overview   Positive hepatitis labs at recent ER visit.         Current Assessment & Plan    Referral to infectious disease.         Constipation - Primary (Chronic)    Overview   Chronic.  Intermittent control.  Patient is not having bowel movements daily.  Starting to have bloody stool.         Current Assessment & Plan   Start stool softener.  Check x-ray for stool burden.  Referral to GI and patient would need updated colonoscopy with bloody stool.    Please be advised constipation condition course.  I recommend increase daily water intake to an acceptable level.  Increase fiber.  Recommend stool softener and laxative if needed.  Goal of 1 bowel movement daily.  Follow-up to clinic or notify me if no improvement or symptoms are persistent or worsening.  ER precautions were given.  Recommend daily exercise as tolerated, adequate water intake (six 8-oz glasses of water daily), and high fiber diet. OTC fiber supplements are recommended if diet does not reach daily fiber goal (20-30 grams daily), such as Metamucil, Citrucel, or FiberCon (take as directed, separate from other oral medications by >2 hours).  - CONTINUE OTC stool softener such as Colace as directed to avoid hard stools and straining with bowel movements PRN  -If still no improvement with these measures, call/follow-up           Bloody stool    Abdominal pain     Other Visit Diagnoses         Need for pneumococcal vaccine                 Review of patient's allergies indicates:   Allergen Reactions    Darvocet a500 [propoxyphene n-acetaminophen]     Phenytoin sodium extended Other (See Comments)     Current Outpatient Medications   Medication Instructions    albuterol (PROVENTIL/VENTOLIN HFA) 90 mcg/actuation inhaler 2 puffs, Inhalation, Every 4 hours PRN, Rescue    albuterol sulfate 2.5 mg, Nebulization, Every 6 hours PRN, Rescue    ALPRAZolam (XANAX) 0.5 mg, Oral, 2 times daily PRN    brivaracetam (BRIVIACT) 100 mg, Oral, 2 times daily    busPIRone (BUSPAR) 30 mg, Oral, 2 times daily    butalbital-acetaminophen-caffeine -40 mg  "(FIORICET, ESGIC) -40 mg per tablet 2 tablets, Oral, Daily    citalopram (CELEXA) 20 MG tablet     divalproex (DEPAKOTE) 250 mg, 2 times daily    docusate sodium (COLACE) 100 mg, Oral, 2 times daily PRN    famotidine (PEPCID) 40 mg, Oral, Daily, Due for annual with PCP    gabapentin (NEURONTIN) 800 mg, Oral, 3 times daily    hydrocortisone 2.5 % cream Topical (Top), 2 times daily    hydrOXYzine pamoate (VISTARIL) 50 mg, Daily PRN    methylphenidate HCl (RITALIN) 20 mg, 3 times daily    nicotine (NICODERM CQ) 21 mg/24 hr 1 patch, Transdermal, Daily    NURTEC 75 mg, Oral, Daily PRN, Place ODT tablet on the tongue; alternatively the ODT tablet may be placed under the tongue    omeprazole (PRILOSEC) 40 mg, Oral, Daily    ondansetron (ZOFRAN-ODT) 8 mg, Oral, Every 6 hours PRN    QUEtiapine (SEROQUEL) 300 mg, Nightly    valACYclovir (VALTREX) 1,000 mg, Oral, Every 12 hours    VITAFOL GUMMIES 3.33 mg iron- 0.33 mg Chew 2 tablets, Oral, Daily      I have reviewed the PMH, social history, FamilyHx, surgical history, allergies and medications documented / confirmed by the patient at the time of this visit.  Review of Systems   Constitutional:  Positive for fatigue. Negative for chills, fever and unexpected weight change.   HENT:  Negative for ear pain and sore throat.    Eyes:  Negative for redness and visual disturbance.   Respiratory:  Negative for cough and shortness of breath.    Cardiovascular:  Negative for chest pain and palpitations.   Gastrointestinal:  Positive for constipation and diarrhea. Negative for nausea and vomiting.   Genitourinary:  Negative for difficulty urinating and hematuria.   Musculoskeletal:  Negative for arthralgias and myalgias.   Skin:  Negative for rash and wound.   Neurological:  Negative for weakness and headaches.   Psychiatric/Behavioral:  Negative for sleep disturbance. The patient is not nervous/anxious.      Objective:   /80   Pulse 109   Ht 5' 4" (1.626 m)   Wt 58.3 kg " (128 lb 8 oz)   SpO2 98%   BMI 22.06 kg/m²   Physical Exam  Vitals and nursing note reviewed.   Constitutional:       General: She is not in acute distress.     Appearance: She is well-developed. She is not ill-appearing, toxic-appearing or diaphoretic.   HENT:      Head: Normocephalic and atraumatic.      Right Ear: Hearing and external ear normal.      Left Ear: Hearing and external ear normal.      Nose: Nose normal. No rhinorrhea.   Eyes:      General: Lids are normal.      Extraocular Movements: Extraocular movements intact.      Conjunctiva/sclera: Conjunctivae normal.      Pupils: Pupils are equal, round, and reactive to light.   Neck:      Vascular: No carotid bruit.   Cardiovascular:      Rate and Rhythm: Normal rate.      Pulses: Normal pulses.   Pulmonary:      Effort: Pulmonary effort is normal. No respiratory distress.      Breath sounds: Normal breath sounds.   Abdominal:      General: Bowel sounds are decreased. There is no distension.      Palpations: Abdomen is soft.      Tenderness: There is abdominal tenderness. There is no right CVA tenderness, left CVA tenderness, guarding or rebound.       Musculoskeletal:         General: Normal range of motion.      Cervical back: Normal range of motion and neck supple.   Lymphadenopathy:      Cervical: No cervical adenopathy.   Skin:     General: Skin is warm and dry.      Capillary Refill: Capillary refill takes less than 2 seconds.      Coloration: Skin is not pale.   Neurological:      General: No focal deficit present.      Mental Status: She is alert and oriented to person, place, and time. She is not disoriented.      Cranial Nerves: No cranial nerve deficit.      Motor: No weakness.      Gait: Gait normal.   Psychiatric:         Attention and Perception: She is attentive.         Mood and Affect: Mood normal. Mood is not anxious or depressed.         Speech: Speech is not rapid and pressured or slurred.         Behavior: Behavior normal. Behavior is  not agitated, aggressive or hyperactive. Behavior is cooperative.         Thought Content: Thought content normal. Thought content is not paranoid or delusional. Thought content does not include homicidal or suicidal ideation. Thought content does not include homicidal or suicidal plan.         Cognition and Memory: Memory is not impaired.         Judgment: Judgment normal.       Physical Exam  Respiratory: Clear to auscultation, no wheezing, rales or rhonchi  Gastrointestinal: Tenderness in the abdomen    Results  Labs   - Metabolic panel: 08/2024, Normal   - Kidney function: 08/2024, Normal   - Liver function: 08/2024, Normal   - Electrolytes: 08/2024, Normal   - Cholesterol: 08/2024, Normal   - Thyroid screening test, TSH: 09/2024, Normal    Assessment:     1. Constipation, unspecified constipation type    2. Encounter for long-term (current) use of medications    3. Bloody stool    4. Gastroesophageal reflux disease without esophagitis    5. Chronic bronchitis, unspecified chronic bronchitis type    6. Chronic migraine without aura without status migrainosus, not intractable    7. Chronic hepatitis C without hepatic coma    8. Hepatitis B infection without delta agent without hepatic coma, unspecified chronicity    9. Abdominal pain, unspecified abdominal location    10. Need for pneumococcal vaccine    11. Cigarette smoker      MDM:   Moderate to high medical complexity.  Moderate to high risk.  Total time: 55 minutes.  This includes total time spent on the encounter, which includes face to face time and non-face to face time preparing to see the patient (eg, review of previous medical records, tests), Obtaining and/or reviewing separately obtained history, documenting clinical information in the electronic or other health record, independently interpreting results (not separately reported)/communicating results to the patient/family/caregiver, and/or care coordination (not separately reported).    I have  Reviewed and summarized old records.  I have performed thorough medication reconciliation today and discussed risk and benefits of medications.  I have reviewed labs and discussed with patient.  All questions were answered.  I am requesting old records and will review them once they are available.  Visit today included increased complexity associated with the care of the episodic problem see above assessment addressed and managing the longitudinal care of the patient due to the serious and/or complex managed problem(s) see above.    I have signed for the following orders AND/OR meds.  Orders Placed This Encounter   Procedures    X-Ray Abdomen Flat And Erect     Standing Status:   Future     Number of Occurrences:   1     Expected Date:   5/8/2025     Expiration Date:   5/8/2026     May the Radiologist modify the order per protocol to meet the clinical needs of the patient?:   Yes     Release to patient:   Immediate    CBC Without Differential     Standing Status:   Future     Number of Occurrences:   1     Expected Date:   5/8/2025     Expiration Date:   7/7/2026    Comprehensive Metabolic Panel     Standing Status:   Future     Number of Occurrences:   1     Expected Date:   5/8/2025     Expiration Date:   7/7/2026    TSH     Standing Status:   Future     Number of Occurrences:   1     Expected Date:   5/8/2025     Expiration Date:   7/7/2026    Hemoglobin A1C     Standing Status:   Future     Number of Occurrences:   1     Expected Date:   5/8/2025     Expiration Date:   7/7/2026    Lipid Panel     Standing Status:   Future     Number of Occurrences:   1     Expected Date:   5/8/2025     Expiration Date:   7/7/2026    Ambulatory referral/consult to Neurology     Standing Status:   Future     Expected Date:   5/15/2025     Expiration Date:   6/8/2026     Referral Priority:   Routine     Referral Type:   Consultation     Referral Reason:   Specialty Services Required     Requested Specialty:   Neurology     Number  of Visits Requested:   1    Ambulatory referral/consult to Gastroenterology     Standing Status:   Future     Expected Date:   5/15/2025     Expiration Date:   6/8/2026     Referral Priority:   Routine     Referral Type:   Consultation     Referral Reason:   Specialty Services Required     Requested Specialty:   Gastroenterology     Number of Visits Requested:   1    Ambulatory referral/consult to Endo Procedure      Standing Status:   Future     Expected Date:   5/9/2025     Expiration Date:   11/30/2025     Referral Priority:   Routine     Referral Type:   Consultation     Number of Visits Requested:   1    Ambulatory referral/consult to Infectious Disease     Standing Status:   Future     Expected Date:   5/15/2025     Expiration Date:   6/8/2026     Referral Priority:   Routine     Referral Type:   Consultation     Referral Reason:   Specialty Services Required     Requested Specialty:   Infectious Diseases     Number of Visits Requested:   1    Ambulatory referral/consult to Smoking Cessation Program     Standing Status:   Future     Expected Date:   5/15/2025     Expiration Date:   6/8/2026     Referral Priority:   Routine     Referral Type:   Consultation     Referral Reason:   Specialty Services Required     Requested Specialty:   Addiction Medicine     Number of Visits Requested:   1    Ambulatory referral/consult to Gastroenterology     Standing Status:   Future     Expected Date:   5/15/2025     Expiration Date:   6/8/2026     Referral Priority:   Routine     Referral Type:   Consultation     Referral Reason:   Specialty Services Required     Referred to Provider:   Mynor Lin MD     Requested Specialty:   Gastroenterology     Number of Visits Requested:   1    Ambulatory referral/consult to Gastroenterology     Standing Status:   Future     Expected Date:   5/15/2025     Expiration Date:   6/8/2026     Referral Priority:   Routine     Referral Type:   Consultation     Referral Reason:    Specialty Services Required     Requested Specialty:   Gastroenterology     Number of Visits Requested:   1     Medications Ordered This Encounter   Medications    docusate sodium (COLACE) 100 MG capsule     Sig: Take 1 capsule (100 mg total) by mouth 2 (two) times daily as needed for Constipation.     Dispense:  60 capsule     Refill:  0    famotidine (PEPCID) 40 MG tablet     Sig: Take 1 tablet (40 mg total) by mouth once daily. Due for annual with PCP     Dispense:  90 tablet     Refill:  3    hydrocortisone 2.5 % cream     Sig: Apply topically 2 (two) times daily.     Dispense:  454 g     Refill:  3    omeprazole (PRILOSEC) 40 MG capsule     Sig: Take 1 capsule (40 mg total) by mouth once daily.     Dispense:  90 capsule     Refill:  3    pneumoc 20-shaw conj-dip cr(PF) (PREVNAR-20 (PF)) injection Syrg 0.5 mL    rimegepant (NURTEC) 75 mg odt     Sig: Take 1 tablet (75 mg total) by mouth daily as needed for Migraine. Place ODT tablet on the tongue; alternatively the ODT tablet may be placed under the tongue     Dispense:  9 tablet     Refill:  2        Follow up in about 6 months (around 11/8/2025), or if symptoms worsen or fail to improve, for Med refills, LAB RESULTS.  Future Appointments       Date Provider Specialty Appt Notes    7/7/2025 Elian Camp MD Neurology headache          If no improvement in symptoms or symptoms worsen, advised to call/follow-up at clinic or go to ER. Patient voiced understanding and all questions/concerns were addressed.   DISCLAIMER: This note was compiled by using a speech recognition dictation system and therefore please be aware that typographical / speech recognition errors can and do occur.  Please contact me if you see any errors specifically.  Consent was obtained for ESTHER recording system prior to the visit.    This note was generated with the assistance of ambient listening technology. Verbal consent was obtained by the patient and accompanying visitor(s) for  the recording of patient appointment to facilitate this note. I attest to having reviewed and edited the generated note for accuracy, though some syntax or spelling errors may persist. Please contact the author of this note for any clarification.    Paul Larson M.D.       Office: [phone] [address] [place] [address]  FAX: [phone]

## 2025-05-09 NOTE — TELEPHONE ENCOUNTER
Patient and spoke to her regarding lab work blood glucose being 37. Patient stated she had a frappe prior to labs being drawn so she doesn't know why lab would be so low. Patient was not symptomatic at the time of lab draw. Referral pended to endocrinology and ER precautions given to patient

## 2025-05-09 NOTE — TELEPHONE ENCOUNTER
I have signed for the following orders AND/OR meds.  Please call the patient and ask the patient to schedule the testing AND/OR inform about any medications that were sent.      Orders Placed This Encounter   Procedures    Ambulatory referral/consult to Endocrinology     Standing Status:   Future     Expected Date:   5/16/2025     Expiration Date:   6/9/2026     Referral Priority:   Routine     Referral Type:   Consultation     Requested Specialty:   Endocrinology     Number of Visits Requested:   1

## 2025-05-09 NOTE — PROGRESS NOTES
Make follow-up lab appointment per recommendation below.  Check to see if patient has seen the results through my chart.  If not then,  #CALL THE PATIENT# to discuss results/see if they have questions and document verification of contact. Make F/U appt if needed. [phone]      #My interpretation that was sent to them through [employer]:  Sara, I have reviewed your recent blood work.     Your complete blood count is normal.    Your metabolic panel which shows your glucose, kidney function, electrolytes, and liver function is abnormal.  Your glucose is very low.  You were not having any symptoms of low blood sugar at the time of your blood work. However if you start having symptoms of hypoglycemia you need to have a source of glucose nearby at all times.  ER precautions for severe symptoms of hypoglycemia.  If this is a recurring event that I recommend that you establish care with an endocrinologist.  I have placed a referral.  Your electrolytes are normal sodium potassium chloride your kidney function is normal your calcium level is low and albumin is low.  Liver enzymes are normal.    Thyroid study is normal.   Your cholesterol is stable.    Your hemoglobin A1c is normal.  This test is gold standard screening test for diabetes. It is a measures 3 months of your average blood sugar.  =========================  Also please address any outstanding health maintenance that may be due: There are no preventive care reminders to display for this patient.

## 2025-05-12 ENCOUNTER — TELEPHONE (OUTPATIENT)
Dept: FAMILY MEDICINE | Facility: CLINIC | Age: 35
End: 2025-05-12
Payer: MEDICAID

## 2025-05-12 DIAGNOSIS — B18.2 CHRONIC HEPATITIS C WITHOUT HEPATIC COMA: Primary | ICD-10-CM

## 2025-05-12 DIAGNOSIS — B19.10 HEPATITIS B INFECTION WITHOUT DELTA AGENT WITHOUT HEPATIC COMA, UNSPECIFIED CHRONICITY: ICD-10-CM

## 2025-05-12 NOTE — TELEPHONE ENCOUNTER
----- Message from Gwendolyn sent at 5/12/2025  4:21 PM CDT -----  Regarding: please enter external referral for infectious disease  Per infectious disease department, patient has Medicaid and no internal openings for new patients with Medicaid as only coverage.Please enter external referral for infectious disease and fax.Patient is on waiting list for all other referrals for departments taking new patients with medicaid as only form of coverage.

## 2025-05-13 ENCOUNTER — PATIENT MESSAGE (OUTPATIENT)
Dept: FAMILY MEDICINE | Facility: CLINIC | Age: 35
End: 2025-05-13
Payer: MEDICAID

## 2025-05-13 NOTE — TELEPHONE ENCOUNTER
I have signed for the following orders AND/OR meds.  Please call the patient and ask the patient to schedule the testing AND/OR inform about any medications that were sent.      Orders Placed This Encounter   Procedures    Ambulatory referral/consult to Infectious Disease     Standing Status:   Future     Expected Date:   5/19/2025     Expiration Date:   6/12/2026     Referral Priority:   Routine     Referral Type:   Consultation     Referral Reason:   Specialty Services Required     Requested Specialty:   Infectious Diseases     Number of Visits Requested:   1

## 2025-05-13 NOTE — TELEPHONE ENCOUNTER
Called pt to advise her to call her ins and see where we need to fax referral. No answer left vm my chart message sent as well

## 2025-05-20 ENCOUNTER — TELEPHONE (OUTPATIENT)
Dept: INFECTIOUS DISEASES | Facility: CLINIC | Age: 35
End: 2025-05-20
Payer: MEDICAID

## 2025-05-20 DIAGNOSIS — Z79.899 ENCOUNTER FOR LONG-TERM (CURRENT) USE OF MEDICATIONS: ICD-10-CM

## 2025-05-20 DIAGNOSIS — R56.9 SEIZURES: ICD-10-CM

## 2025-05-20 DIAGNOSIS — F17.200 TOBACCO DEPENDENCE SYNDROME: Chronic | ICD-10-CM

## 2025-05-20 RX ORDER — IBUPROFEN 200 MG
1 TABLET ORAL DAILY
Qty: 28 PATCH | Refills: 1 | Status: SHIPPED | OUTPATIENT
Start: 2025-05-20

## 2025-05-20 RX ORDER — PNV 112/IRON/FOLIC/OM3/DHA/EPA 3.33-.33MG
2 TABLET,CHEWABLE ORAL DAILY
Qty: 180 TABLET | Refills: 1 | Status: SHIPPED | OUTPATIENT
Start: 2025-05-20

## 2025-05-20 NOTE — TELEPHONE ENCOUNTER
Called patient back, but no answer    Referral placed to ID:  Chronic hepatitis C without hepatic coma [B18.2]  Hepatitis B infection without delta agent without hepatic coma, unspecified    [facility] ID does not see hepatitis, patient should see Hepatology/Hep C clinic

## 2025-05-20 NOTE — TELEPHONE ENCOUNTER
No care due was identified.  [facility] Embedded Care Due Messages. Reference number: [id number].   5/20/2025 1:40:44 PM CDT

## 2025-05-20 NOTE — TELEPHONE ENCOUNTER
----- Message from Shyla sent at 5/20/2025  2:25 PM CDT -----  Contact: Patient, [phone]  [email or web address] Patient is calling for Medical Advice regarding: Calling to schedule an appointment, referral in her chart. Please call her. Thanks.How long has patient had these symptoms: N/APharmacy name and phone#: N/APatient wants a call back or [facility]: N/AComments: N/APlease advise patient replies from provider may take up to 48 hours.

## 2025-05-21 ENCOUNTER — TELEPHONE (OUTPATIENT)
Dept: ENDOSCOPY | Facility: HOSPITAL | Age: 35
End: 2025-05-21

## 2025-05-21 ENCOUNTER — CLINICAL SUPPORT (OUTPATIENT)
Dept: ENDOSCOPY | Facility: HOSPITAL | Age: 35
End: 2025-05-21
Payer: MEDICAID

## 2025-05-21 DIAGNOSIS — K59.00 CONSTIPATION, UNSPECIFIED CONSTIPATION TYPE: ICD-10-CM

## 2025-05-21 DIAGNOSIS — K92.1 BLOODY STOOL: ICD-10-CM

## 2025-05-21 NOTE — TELEPHONE ENCOUNTER
Called patient to schedule Colonoscopy.  Patient stated she called yesterday and was scheduled somewhere else.

## 2025-05-28 DIAGNOSIS — Z76.0 MEDICATION REFILL: ICD-10-CM

## 2025-05-28 RX ORDER — BUTALBITAL, ACETAMINOPHEN AND CAFFEINE 50; 325; 40 MG/1; MG/1; MG/1
2 TABLET ORAL DAILY
Qty: 30 TABLET | Refills: 0 | Status: CANCELLED | OUTPATIENT
Start: 2025-05-28

## 2025-06-23 ENCOUNTER — TELEPHONE (OUTPATIENT)
Dept: NEUROLOGY | Facility: CLINIC | Age: 35
End: 2025-06-23
Payer: MEDICAID

## 2025-06-23 NOTE — TELEPHONE ENCOUNTER
Attempted to call patient to reschedule appointment due to Dr. Camp being out July 7, no answer, left message to call back.

## 2025-07-09 ENCOUNTER — ON-DEMAND VIRTUAL (OUTPATIENT)
Dept: URGENT CARE | Facility: CLINIC | Age: 35
End: 2025-07-09
Payer: MEDICAID

## 2025-07-09 DIAGNOSIS — N76.0 ACUTE VAGINITIS: Primary | ICD-10-CM

## 2025-07-09 RX ORDER — METRONIDAZOLE 7.5 MG/G
1 GEL VAGINAL NIGHTLY
Qty: 70 G | Refills: 0 | Status: SHIPPED | OUTPATIENT
Start: 2025-07-09 | End: 2025-07-14

## 2025-07-09 NOTE — LETTER
July 9, 2025    Sara Sanford  [address]  [place] [address]             Virtual Visit - Urgent Care  Urgent Care  [address] [address]   July 9, 2025     Patient: Sara Sanford   YOB: 1990   Date of Visit: 7/9/2025       To Whom it May Concern:    Sara Sanford was seen virtually on 7/9/2025. She may return to work on 7/10/25.    Please excuse her from any classes or work missed on 7/8 and 7/9    If you have any questions or concerns, please don't hesitate to call.    Sincerely,           Azucena Loredo, GIGIP

## 2025-07-09 NOTE — PROGRESS NOTES
Subjective:      Patient ID: Sara Sanford is a 34 y.o. female.    Vitals:  vitals were not taken for this visit.     Chief Complaint: Headache (Vaginitis/)      Visit Type: TELE AUDIOVISUAL - This visit was conducted virtually based on  subjective information and limited objective exam    Present with the patient at the time of consultation: TELEMED PRESENT WITH PATIENT: None  LOCATION OF PATIENT [place]  Two patient identifiers used to verify patient- saying out date of birth and full name.       Past Medical History:   Diagnosis Date    Abnormal Pap smear     ALLERGIC RHINITIS     Anemia     Anxiety     Bipolar 1 disorder     Chronic back pain     Drug-seeking behavior 04/16/2012    Epilepsy     GERD (gastroesophageal reflux disease)     GERD (gastroesophageal reflux disease)     Hepatitis C 2018    Medications given     Opioid dependence 04/16/2012    Panic attacks     Pre-eclampsia     PTSD (post-traumatic stress disorder)     Seizures     last episode 2020, EEG-normal;     Urinary tract infection      Past Surgical History:   Procedure Laterality Date    CHOLECYSTECTOMY      LAPAROSCOPIC LIGATION OF FALLOPIAN TUBE Bilateral 05/23/2022    Procedure: LIGATION, FALLOPIAN TUBE, LAPAROSCOPIC;  Surgeon: Kristian Landaverde MD;  Location: [facility];  Service: OB/GYN;  Laterality: Bilateral;    mirena removal  02/2018    PELVIC LAPAROSCOPY      iud retrieval     TUBAL LIGATION      VAGINAL DELIVERY      times 4    vestibulectomy      WISDOM TOOTH EXTRACTION       Review of patient's allergies indicates:   Allergen Reactions    Darvocet a500 [propoxyphene n-acetaminophen]     Phenytoin sodium extended Other (See Comments)     Medications Ordered Prior to Encounter[1]  Family History   Problem Relation Name Age of Onset    Stroke Mother Madelin Henry     Depression Mother Madelin Henry         Anxiety depression and heart    Heart disease Mother Madelin Henry         Heart problems and stroke     "Heart disease Maternal Grandmother Binta grayson louisa         Heart issues (murmur)    Osteoporosis Maternal Grandfather      Asthma Son Lloyd     GI problems Son Sav     Developmental delay Son Sav     Miscarriages / Stillbirths Half-sister Tamiko         2    Depression Half-sister Rosalinda     Melanoma Maternal Uncle Jeremías "Sujit"         sun exposure    Autism spectrum disorder Other      Depression Sister Rosalinda         Anxiety depression    Early death Sister Kat barclay         Her two daughters (seperate pregnancy stillborn twice)    Miscarriages / Stillbirths Sister Kat barclay         She lost two babygirls both were stillborn    Breast cancer Neg Hx      Ovarian cancer Neg Hx         Medications Ordered                [id number] - [place] - [address]   [address], [place] [address]    Telephone: [phone]   Fax: [phone]   Hours: Not open 24 hours                         E-Prescribed (1 of 1)              metroNIDAZOLE (METROGEL) 0.75 % (37.5mg/5 gram) vaginal gel    Sig: Place 1 applicator vaginally nightly. for 5 days       Start: 7/9/25     Quantity: 70 g Refills: 0                           [facility] Peq Odvv Intake    7/9/2025 10:38 AM CDT - Filed by Patient   What is your current physical address in the event of a medical emergency? [id number]   Are you able to take your vital signs? No   Please attach any relevant images or files    Is your employer contracted with [facility]? No         33 yo female with c/o migraine headache. She states she has been on fiorcet for a long time. She states her md wanted her to try nurtec. She is also having symptoms of vaginal odor. She states she is currently trying to get in with both md and infectious disease.         Constitution: Negative.   HENT: Negative.     Cardiovascular: Negative.    Respiratory: Negative.     Gastrointestinal: Negative.    Endocrine: " negative.   Genitourinary: Negative.  Negative for frequency and urgency.   Musculoskeletal: Negative.    Skin: Negative.    Allergic/Immunologic: Negative.    Neurological: Negative.    Hematologic/Lymphatic: Negative.    Psychiatric/Behavioral: Negative.          Objective:   The physical exam was conducted virtually.    AAO x 3 ; no acute distress noted; appearance normal; mood and behavior normal; thought process normal  Head- normocephalic  Nose- appears normal, no discharge or erythema  Eyes- pupils appear normal in size, no drainage, no erythema  Ears- normal appearing; no discharge, no erythema  Mouth- appears normal  Oropharynx- no erythema, lesions  Lungs- breathing at a normal rate, no acute distress noted  Heart- no reports of tachycardia, palpitations, chest pain  Abdomen- non distended, non tender reported by patient  Skin- warm and dry, no erythema or edema noted by patient or visualized  Psych- as above; no si/hi      Assessment:     1. Acute vaginitis        Plan:       Advised can not fill fiorcet on that  Follow up with infectious disease for hep c  Monitor may need prior auth    Work note given  Treatment for vaginitis metro gel sent to pharmacy      Thank you for choosing [facility]!    Our goal in the [facility] is to always provide outstanding medical care. You may receive a survey by mail or e-mail in the next week regarding your experience today. We would greatly appreciate you completing and returning the survey. Your feedback provides us with a way to recognize our staff who provide very good care, and it helps us learn how to improve when your experience was below our aspiration of excellence.         We appreciate you trusting us with your medical care. We hope you feel better soon. We will be happy to take care of you for all of your future medical needs.    You must understand that you've received an Urgent Care treatment only and that you may be  released before all your medical problems are known or treated. You, the patient, will arrange for follow up care as instructed.    Follow up with your PCP or specialty clinic as directed in the next 1-2 weeks if not improved or as needed.  You can call [phone] to schedule an appointment with the appropriate provider.    If your condition worsens we recommend that you receive another evaluation in person, with your primary care provider, urgent care or at the emergency room immediately or contact your primary medical clinics after hours call service to discuss your concerns.         Acute vaginitis  -     metroNIDAZOLE (METROGEL) 0.75 % (37.5mg/5 gram) vaginal gel; Place 1 applicator vaginally nightly. for 5 days  Dispense: 70 g; Refill: 0                         [1]   Current Outpatient Medications on File Prior to Visit   Medication Sig Dispense Refill    albuterol (PROVENTIL/VENTOLIN HFA) 90 mcg/actuation inhaler Inhale 2 puffs into the lungs every 4 (four) hours as needed for Wheezing. Rescue 54 g 1    albuterol sulfate 2.5 mg/0.5 mL Nebu Take 2.5 mg by nebulization every 6 (six) hours as needed (shortness of breath). Rescue 30 each 0    ALPRAZolam (XANAX) 0.5 MG tablet Take 1 tablet (0.5 mg total) by mouth 2 (two) times daily as needed for Insomnia or Anxiety. 30 tablet 0    brivaracetam (BRIVIACT) 100 mg Tab Take 1 tablet (100 mg total) by mouth 2 (two) times daily. 60 tablet 5    busPIRone (BUSPAR) 30 MG Tab Take 1 tablet (30 mg total) by mouth 2 (two) times daily. 60 tablet 1    butalbital-acetaminophen-caffeine -40 mg (FIORICET, ESGIC) -40 mg per tablet Take 2 tablets by mouth once daily. 30 tablet 0    citalopram (CELEXA) 20 MG tablet       divalproex (DEPAKOTE) 250 MG EC tablet Take 250 mg by mouth 2 (two) times daily.      famotidine (PEPCID) 40 MG tablet Take 1 tablet (40 mg total) by mouth once daily. Due for annual with PCP 90 tablet 3    gabapentin (NEURONTIN) 800 MG tablet Take 1  tablet (800 mg total) by mouth 3 (three) times daily. 90 tablet 0    hydrocortisone 2.5 % cream Apply topically 2 (two) times daily. 454 g 3    hydrOXYzine pamoate (VISTARIL) 50 MG Cap Take 50 mg by mouth daily as needed.      methylphenidate HCl (RITALIN) 20 MG tablet Take 20 mg by mouth 3 (three) times daily.      nicotine (NICODERM CQ) 21 mg/24 hr Place 1 patch onto the skin once daily. 28 patch 1    omeprazole (PRILOSEC) 40 MG capsule Take 1 capsule (40 mg total) by mouth once daily. 90 capsule 3    ondansetron (ZOFRAN-ODT) 8 MG TbDL Take 1 tablet (8 mg total) by mouth every 6 (six) hours as needed (nausea). 30 tablet 1    QUEtiapine (SEROQUEL) 300 MG Tab Take 300 mg by mouth every evening.      rimegepant (NURTEC) 75 mg odt Take 1 tablet (75 mg total) by mouth daily as needed for Migraine. Place ODT tablet on the tongue; alternatively the ODT tablet may be placed under the tongue 9 tablet 2    valACYclovir (VALTREX) 1000 MG tablet Take 1 tablet (1,000 mg total) by mouth every 12 (twelve) hours. for 10 days 20 tablet 0    VITAFOL GUMMIES 3.33 mg iron- 0.33 mg Chew Take 2 tablets by mouth once daily. 180 tablet 1    [DISCONTINUED] levomefolate calcium (ELFOLATE) 15 mg Tab       [DISCONTINUED] ranitidine (ZANTAC) 150 MG tablet Take 1 tablet (150 mg total) by mouth 2 (two) times daily as needed. 60 tablet 11     No current facility-administered medications on file prior to visit.

## 2025-08-05 ENCOUNTER — E-VISIT (OUTPATIENT)
Dept: OBSTETRICS AND GYNECOLOGY | Facility: CLINIC | Age: 35
End: 2025-08-05
Payer: MEDICAID

## 2025-08-05 DIAGNOSIS — Z34.90 PREGNANCY, UNSPECIFIED GESTATIONAL AGE: Primary | ICD-10-CM

## 2025-08-05 NOTE — PROGRESS NOTES
Patient ID: Sara Sanford is a 34 y.o. female.        E-Visit Time Tracking:             Chief Complaint: General Illness (Entered automatically based on patient selection in [employer].)      The patient initiated a request through [employer] on 8/5/2025 for evaluation and management with a chief complaint of General Illness (Entered automatically based on patient selection in [employer].)     I evaluated the questionnaire submission on 08/05/2025.    [facility] Peq Evisit [facility]    8/5/2025  4:36 AM CDT - Filed by Patient   What do you need help with? Other Concern   Do you agree to participate in an E-Visit? Yes   If you have any of the following symptoms, please go to the nearest emergency room or call 911: I acknowledge   Do you have any of the following pregnancy-related conditions? (Pregnant, Possibly pregnant, Breast feeding, None) Possibly pregnant   Do you have any of the following symptoms? No   What is the main issue you would like addressed today? Need bloodwork called in for hcg quant test at [facility]   Please describe your symptoms. 3 Positive pregnancy test after tubal lit. Want to confirm if i am and rule out ectopic pregnancy   Where is your problem located? Shoulder pain no bleeding mild cramping off and on nothing severe   On a scale of 1-10, where 10 is the worst you can imagine, how severe are your symptoms? (range: 1 - 10) 3   Have you had these symptoms before? No   How long have you been having these symptoms? (Just today, For a few days, For a week, For one to four weeks, For more than a month) A few days   What helps with your symptoms? Standing up   What makes your symptoms feel worse? Lying down   Are these symptoms related to a condition that you currently have? (Yes, No, Not sure) Not sure   Please describe any probable cause for your symptoms. Pregnancy   Provide any information you feel is important to your history not asked above Just need test ro confirm [facility] if  possible i have work 10-6pm today   Please attach any relevant images or files    Are you able to take your vitals? No         Encounter Diagnosis   Name Primary?    Pregnancy, unspecified gestational age Yes        Orders Placed This Encounter   Procedures    HCG, Quantitative     Standing Status:   Future     Expected Date:   8/5/2025     Expiration Date:   8/6/2026     Release to patient:   Immediate     Send normal result to authorizing provider's In Basket if patient is active on MyChart::   Yes            No follow-ups on file.

## 2025-08-06 ENCOUNTER — LAB VISIT (OUTPATIENT)
Dept: LAB | Facility: HOSPITAL | Age: 35
End: 2025-08-06
Attending: SPECIALIST
Payer: MEDICAID

## 2025-08-06 DIAGNOSIS — Z34.90 PREGNANCY, UNSPECIFIED GESTATIONAL AGE: ICD-10-CM

## 2025-08-06 LAB — HCG INTACT+B SERPL-ACNC: <2.4 MIU/ML

## 2025-08-06 PROCEDURE — 36415 COLL VENOUS BLD VENIPUNCTURE: CPT | Mod: PO

## 2025-08-06 PROCEDURE — 84702 CHORIONIC GONADOTROPIN TEST: CPT

## 2025-08-28 ENCOUNTER — ON-DEMAND VIRTUAL (OUTPATIENT)
Dept: URGENT CARE | Facility: CLINIC | Age: 35
End: 2025-08-28
Payer: MEDICAID

## 2025-08-28 DIAGNOSIS — R51.9 NONINTRACTABLE HEADACHE, UNSPECIFIED CHRONICITY PATTERN, UNSPECIFIED HEADACHE TYPE: ICD-10-CM

## 2025-08-28 DIAGNOSIS — J02.9 SORE THROAT: ICD-10-CM

## 2025-08-28 DIAGNOSIS — R09.82 POST-NASAL DRAINAGE: ICD-10-CM

## 2025-08-28 DIAGNOSIS — N89.8 VAGINAL IRRITATION: Primary | ICD-10-CM

## 2025-08-28 RX ORDER — METRONIDAZOLE 7.5 MG/G
1 GEL VAGINAL NIGHTLY
Qty: 70 G | Refills: 0 | Status: SHIPPED | OUTPATIENT
Start: 2025-08-28 | End: 2025-09-02

## 2025-08-28 RX ORDER — METHYLPREDNISOLONE 4 MG/1
TABLET ORAL
Qty: 21 EACH | Refills: 0 | Status: SHIPPED | OUTPATIENT
Start: 2025-08-28